# Patient Record
Sex: MALE | Race: WHITE | NOT HISPANIC OR LATINO | Employment: FULL TIME | URBAN - METROPOLITAN AREA
[De-identification: names, ages, dates, MRNs, and addresses within clinical notes are randomized per-mention and may not be internally consistent; named-entity substitution may affect disease eponyms.]

---

## 2017-03-31 RX ORDER — LISINOPRIL 10 MG/1
TABLET ORAL
Qty: 90 TABLET | Refills: 0 | Status: SHIPPED | OUTPATIENT
Start: 2017-03-31 | End: 2017-07-21 | Stop reason: SDUPTHER

## 2017-03-31 RX ORDER — SIMVASTATIN 40 MG
TABLET ORAL
Qty: 90 TABLET | Refills: 0 | Status: SHIPPED | OUTPATIENT
Start: 2017-03-31 | End: 2017-07-21 | Stop reason: SDUPTHER

## 2017-03-31 RX ORDER — SITAGLIPTIN AND METFORMIN HYDROCHLORIDE 1000; 50 MG/1; MG/1
TABLET, FILM COATED, EXTENDED RELEASE ORAL
Qty: 180 TABLET | Refills: 0 | Status: SHIPPED | OUTPATIENT
Start: 2017-03-31 | End: 2017-07-21 | Stop reason: SDUPTHER

## 2017-03-31 RX ORDER — LEVOTHYROXINE SODIUM 88 UG/1
TABLET ORAL
Qty: 90 TABLET | Refills: 0 | Status: SHIPPED | OUTPATIENT
Start: 2017-03-31 | End: 2017-07-21 | Stop reason: SDUPTHER

## 2017-07-03 RX ORDER — LISINOPRIL 10 MG/1
TABLET ORAL
Qty: 90 TABLET | OUTPATIENT
Start: 2017-07-03

## 2017-07-03 RX ORDER — SITAGLIPTIN AND METFORMIN HYDROCHLORIDE 1000; 50 MG/1; MG/1
TABLET, FILM COATED, EXTENDED RELEASE ORAL
Qty: 180 TABLET | OUTPATIENT
Start: 2017-07-03

## 2017-07-03 RX ORDER — SIMVASTATIN 40 MG
TABLET ORAL
Qty: 90 TABLET | OUTPATIENT
Start: 2017-07-03

## 2017-07-03 RX ORDER — LEVOTHYROXINE SODIUM 88 UG/1
TABLET ORAL
Qty: 90 TABLET | OUTPATIENT
Start: 2017-07-03

## 2017-07-03 RX ORDER — GLYBURIDE 5 MG/1
TABLET ORAL
Qty: 180 TABLET | Refills: 1 | OUTPATIENT
Start: 2017-07-03

## 2017-07-21 RX ORDER — SIMVASTATIN 40 MG
40 TABLET ORAL NIGHTLY
Qty: 90 TABLET | Refills: 0 | Status: SHIPPED | OUTPATIENT
Start: 2017-07-21 | End: 2017-10-22 | Stop reason: SDUPTHER

## 2017-07-21 RX ORDER — LEVOTHYROXINE SODIUM 88 UG/1
88 TABLET ORAL DAILY
Qty: 90 TABLET | Refills: 0 | Status: SHIPPED | OUTPATIENT
Start: 2017-07-21 | End: 2017-08-03

## 2017-07-21 RX ORDER — LISINOPRIL 10 MG/1
10 TABLET ORAL DAILY
Qty: 90 TABLET | Refills: 0 | Status: SHIPPED | OUTPATIENT
Start: 2017-07-21 | End: 2017-10-22 | Stop reason: SDUPTHER

## 2017-07-21 RX ORDER — GLYBURIDE 5 MG/1
5 TABLET ORAL 2 TIMES DAILY
Qty: 180 TABLET | Refills: 0 | Status: SHIPPED | OUTPATIENT
Start: 2017-07-21 | End: 2017-10-22 | Stop reason: SDUPTHER

## 2017-07-26 DIAGNOSIS — Z11.59 NEED FOR HEPATITIS C SCREENING TEST: Primary | ICD-10-CM

## 2017-07-28 LAB
25(OH)D3+25(OH)D2 SERPL-MCNC: 58.4 NG/ML (ref 30–100)
ALBUMIN SERPL-MCNC: 4.3 G/DL (ref 3.5–5.2)
ALBUMIN/GLOB SERPL: 1.4 G/DL
ALP SERPL-CCNC: 80 U/L (ref 39–117)
ALT SERPL-CCNC: 61 U/L (ref 1–41)
APPEARANCE UR: CLEAR
AST SERPL-CCNC: 48 U/L (ref 1–40)
BILIRUB SERPL-MCNC: 0.7 MG/DL (ref 0.1–1.2)
BILIRUB UR QL STRIP: NEGATIVE
BUN SERPL-MCNC: 20 MG/DL (ref 8–23)
BUN/CREAT SERPL: 14.3 (ref 7–25)
CALCIUM SERPL-MCNC: 9.7 MG/DL (ref 8.6–10.5)
CHLORIDE SERPL-SCNC: 105 MMOL/L (ref 98–107)
CHOLEST SERPL-MCNC: 119 MG/DL (ref 100–199)
CK SERPL-CCNC: 128 U/L (ref 20–200)
CO2 SERPL-SCNC: 21.5 MMOL/L (ref 22–29)
COLOR UR: YELLOW
CREAT SERPL-MCNC: 1.4 MG/DL (ref 0.76–1.27)
ERYTHROCYTE [DISTWIDTH] IN BLOOD BY AUTOMATED COUNT: 12.7 % (ref 11.5–14.5)
GLOBULIN SER CALC-MCNC: 3 GM/DL
GLUCOSE SERPL-MCNC: 134 MG/DL (ref 65–99)
GLUCOSE UR QL: NEGATIVE
HBA1C MFR BLD: 7.32 % (ref 4.8–5.6)
HCT VFR BLD AUTO: 40 % (ref 40.4–52.2)
HCV AB S/CO SERPL IA: 0.1 S/CO RATIO (ref 0–0.9)
HDL SERPL-SCNC: 30.8 UMOL/L
HDLC SERPL-MCNC: 37 MG/DL
HGB BLD-MCNC: 13.3 G/DL (ref 13.7–17.6)
HGB UR QL STRIP: NEGATIVE
KETONES UR QL STRIP: NEGATIVE
LDL SERPL QN: 19.9 NM
LDL SERPL-SCNC: 772 NMOL/L
LDL SMALL SERPL-SCNC: 549 NMOL/L
LDLC SERPL CALC-MCNC: 42 MG/DL (ref 0–99)
LEUKOCYTE ESTERASE UR QL STRIP: NEGATIVE
MCH RBC QN AUTO: 30 PG (ref 27–32.7)
MCHC RBC AUTO-ENTMCNC: 33.3 G/DL (ref 32.6–36.4)
MCV RBC AUTO: 90.3 FL (ref 79.8–96.2)
MICROALBUMIN UR-MCNC: 11.8 UG/ML
NITRITE UR QL STRIP: NEGATIVE
PH UR STRIP: 5.5 [PH] (ref 5–8)
PLATELET # BLD AUTO: 151 10*3/MM3 (ref 140–500)
POTASSIUM SERPL-SCNC: 4.2 MMOL/L (ref 3.5–5.2)
PROT SERPL-MCNC: 7.3 G/DL (ref 6–8.5)
PROT UR QL STRIP: NEGATIVE
PSA SERPL-MCNC: 0.23 NG/ML (ref 0–4)
RBC # BLD AUTO: 4.43 10*6/MM3 (ref 4.6–6)
SODIUM SERPL-SCNC: 141 MMOL/L (ref 136–145)
SP GR UR: 1.02 (ref 1–1.03)
T3FREE SERPL-MCNC: 2.7 PG/ML (ref 2–4.4)
T4 FREE SERPL-MCNC: 1.4 NG/DL (ref 0.93–1.7)
TRIGL SERPL-MCNC: 200 MG/DL (ref 0–149)
TSH SERPL DL<=0.005 MIU/L-ACNC: 5.42 MIU/ML (ref 0.27–4.2)
UROBILINOGEN UR STRIP-MCNC: NORMAL MG/DL
WBC # BLD AUTO: 5.14 10*3/MM3 (ref 4.5–10.7)

## 2017-08-03 ENCOUNTER — OFFICE VISIT (OUTPATIENT)
Dept: INTERNAL MEDICINE | Facility: CLINIC | Age: 62
End: 2017-08-03

## 2017-08-03 VITALS
OXYGEN SATURATION: 95 % | WEIGHT: 204 LBS | HEART RATE: 65 BPM | SYSTOLIC BLOOD PRESSURE: 104 MMHG | DIASTOLIC BLOOD PRESSURE: 66 MMHG | BODY MASS INDEX: 28.56 KG/M2 | HEIGHT: 71 IN

## 2017-08-03 DIAGNOSIS — E11.9 DIABETIC EYE EXAM (HCC): Chronic | ICD-10-CM

## 2017-08-03 DIAGNOSIS — I10 BENIGN ESSENTIAL HYPERTENSION: Chronic | ICD-10-CM

## 2017-08-03 DIAGNOSIS — Z51.81 THERAPEUTIC DRUG MONITORING: ICD-10-CM

## 2017-08-03 DIAGNOSIS — Z23 NEED FOR TDAP VACCINATION: ICD-10-CM

## 2017-08-03 DIAGNOSIS — K63.5 BENIGN COLON POLYP: Chronic | ICD-10-CM

## 2017-08-03 DIAGNOSIS — E03.9 HYPOTHYROIDISM, UNSPECIFIED TYPE: Chronic | ICD-10-CM

## 2017-08-03 DIAGNOSIS — Z01.00 DIABETIC EYE EXAM (HCC): Chronic | ICD-10-CM

## 2017-08-03 DIAGNOSIS — Z91.09 MULTIPLE ENVIRONMENTAL ALLERGIES: Chronic | ICD-10-CM

## 2017-08-03 DIAGNOSIS — Z23 NEED FOR PNEUMOCOCCAL VACCINATION: ICD-10-CM

## 2017-08-03 DIAGNOSIS — Z00.00 ROUTINE PHYSICAL EXAMINATION: Primary | ICD-10-CM

## 2017-08-03 DIAGNOSIS — R74.8 ELEVATED LIVER ENZYMES: ICD-10-CM

## 2017-08-03 DIAGNOSIS — E55.9 VITAMIN D DEFICIENCY: Chronic | ICD-10-CM

## 2017-08-03 DIAGNOSIS — E11.9 ENCOUNTER FOR DIABETIC FOOT EXAM (HCC): Chronic | ICD-10-CM

## 2017-08-03 DIAGNOSIS — D64.9 CHRONIC ANEMIA: ICD-10-CM

## 2017-08-03 DIAGNOSIS — E78.5 HYPERLIPIDEMIA, UNSPECIFIED HYPERLIPIDEMIA TYPE: Chronic | ICD-10-CM

## 2017-08-03 DIAGNOSIS — E11.9 TYPE 2 DIABETES MELLITUS WITHOUT COMPLICATION, WITHOUT LONG-TERM CURRENT USE OF INSULIN (HCC): Chronic | ICD-10-CM

## 2017-08-03 LAB
BASOPHILS # BLD AUTO: 0.03 10*3/MM3 (ref 0–0.2)
BASOPHILS NFR BLD AUTO: 0.5 % (ref 0–1.5)
EOSINOPHIL # BLD AUTO: 0.12 10*3/MM3 (ref 0–0.7)
EOSINOPHIL NFR BLD AUTO: 2.2 % (ref 0.3–6.2)
ERYTHROCYTE [DISTWIDTH] IN BLOOD BY AUTOMATED COUNT: 12.9 % (ref 11.5–14.5)
HCT VFR BLD AUTO: 40.6 % (ref 40.4–52.2)
HGB BLD-MCNC: 13.1 G/DL (ref 13.7–17.6)
IMM GRANULOCYTES # BLD: 0 10*3/MM3 (ref 0–0.03)
IMM GRANULOCYTES NFR BLD: 0 % (ref 0–0.5)
LYMPHOCYTES # BLD AUTO: 1.56 10*3/MM3 (ref 0.9–4.8)
LYMPHOCYTES NFR BLD AUTO: 28.5 % (ref 19.6–45.3)
MCH RBC QN AUTO: 29.5 PG (ref 27–32.7)
MCHC RBC AUTO-ENTMCNC: 32.3 G/DL (ref 32.6–36.4)
MCV RBC AUTO: 91.4 FL (ref 79.8–96.2)
MONOCYTES # BLD AUTO: 0.43 10*3/MM3 (ref 0.2–1.2)
MONOCYTES NFR BLD AUTO: 7.9 % (ref 5–12)
NEUTROPHILS # BLD AUTO: 3.33 10*3/MM3 (ref 1.9–8.1)
NEUTROPHILS NFR BLD AUTO: 60.9 % (ref 42.7–76)
PLATELET # BLD AUTO: 166 10*3/MM3 (ref 140–500)
RBC # BLD AUTO: 4.44 10*6/MM3 (ref 4.6–6)
WBC # BLD AUTO: 5.47 10*3/MM3 (ref 4.5–10.7)

## 2017-08-03 PROCEDURE — 90472 IMMUNIZATION ADMIN EACH ADD: CPT | Performed by: INTERNAL MEDICINE

## 2017-08-03 PROCEDURE — 90715 TDAP VACCINE 7 YRS/> IM: CPT | Performed by: INTERNAL MEDICINE

## 2017-08-03 PROCEDURE — 99213 OFFICE O/P EST LOW 20 MIN: CPT | Performed by: INTERNAL MEDICINE

## 2017-08-03 PROCEDURE — 99396 PREV VISIT EST AGE 40-64: CPT | Performed by: INTERNAL MEDICINE

## 2017-08-03 PROCEDURE — 90732 PPSV23 VACC 2 YRS+ SUBQ/IM: CPT | Performed by: INTERNAL MEDICINE

## 2017-08-03 PROCEDURE — 90471 IMMUNIZATION ADMIN: CPT | Performed by: INTERNAL MEDICINE

## 2017-08-03 RX ORDER — LEVOTHYROXINE SODIUM 0.12 MG/1
TABLET ORAL
Qty: 30 TABLET | Refills: 6 | Status: SHIPPED | OUTPATIENT
Start: 2017-08-03 | End: 2018-02-02 | Stop reason: SDUPTHER

## 2017-08-04 LAB
IRON SATN MFR SERPL: 24 % (ref 20–50)
IRON SERPL-MCNC: 93 MCG/DL (ref 59–158)
TIBC SERPL-MCNC: 389 MCG/DL
UIBC SERPL-MCNC: 296 MCG/DL

## 2017-08-04 NOTE — PROGRESS NOTES
I called pt per dr Arrington and gave him the results.  He also ask about his thyroid med was it changed or was he waiting on the other test results.  I looked and 125mcg of levothyroxine was sent in w 6rf so I let him know that he was to be taking that now.

## 2017-08-08 ENCOUNTER — RESULTS ENCOUNTER (OUTPATIENT)
Dept: INTERNAL MEDICINE | Facility: CLINIC | Age: 62
End: 2017-08-08

## 2017-08-08 DIAGNOSIS — R74.8 ELEVATED LIVER ENZYMES: ICD-10-CM

## 2017-08-08 LAB
2ME-CITRATE SERPL-MCNC: 153 NMOL/L (ref 60–228)
CYSTATHIONIN SERPL-SCNC: 303 NMOL/L (ref 44–342)
HCYS SERPL-SCNC: 12.6 UMOL/L (ref 5.1–13.9)
Lab: NORMAL
Lab: NORMAL
METHYLMALONATE SERPL-SCNC: 158 NMOL/L (ref 0–378)

## 2017-09-06 ENCOUNTER — TELEPHONE (OUTPATIENT)
Dept: INTERNAL MEDICINE | Facility: CLINIC | Age: 62
End: 2017-09-06

## 2017-09-06 NOTE — TELEPHONE ENCOUNTER
The iron studies were normal.  There is no B12 or folic acid deficiency.  The anemia is very mild on repeat CBC.  Nothing further to do other than observation.  No worry.

## 2017-10-23 RX ORDER — SIMVASTATIN 40 MG
TABLET ORAL
Qty: 90 TABLET | Refills: 0 | Status: SHIPPED | OUTPATIENT
Start: 2017-10-23 | End: 2018-02-02 | Stop reason: SDUPTHER

## 2017-10-23 RX ORDER — SITAGLIPTIN AND METFORMIN HYDROCHLORIDE 1000; 50 MG/1; MG/1
TABLET, FILM COATED, EXTENDED RELEASE ORAL
Qty: 180 TABLET | Refills: 0 | Status: SHIPPED | OUTPATIENT
Start: 2017-10-23 | End: 2018-02-02 | Stop reason: SDUPTHER

## 2017-10-23 RX ORDER — GLYBURIDE 5 MG/1
TABLET ORAL
Qty: 180 TABLET | Refills: 0 | Status: SHIPPED | OUTPATIENT
Start: 2017-10-23 | End: 2018-02-02 | Stop reason: SDUPTHER

## 2017-10-23 RX ORDER — LISINOPRIL 10 MG/1
TABLET ORAL
Qty: 90 TABLET | Refills: 0 | Status: SHIPPED | OUTPATIENT
Start: 2017-10-23 | End: 2018-02-02 | Stop reason: SDUPTHER

## 2018-01-22 RX ORDER — SITAGLIPTIN AND METFORMIN HYDROCHLORIDE 1000; 50 MG/1; MG/1
TABLET, FILM COATED, EXTENDED RELEASE ORAL
Qty: 180 TABLET | Refills: 0 | OUTPATIENT
Start: 2018-01-22

## 2018-01-22 RX ORDER — LISINOPRIL 10 MG/1
TABLET ORAL
Qty: 90 TABLET | Refills: 0 | OUTPATIENT
Start: 2018-01-22

## 2018-01-22 RX ORDER — SIMVASTATIN 40 MG
TABLET ORAL
Qty: 90 TABLET | Refills: 0 | OUTPATIENT
Start: 2018-01-22

## 2018-01-22 RX ORDER — GLYBURIDE 5 MG/1
TABLET ORAL
Qty: 180 TABLET | Refills: 0 | OUTPATIENT
Start: 2018-01-22

## 2018-02-02 DIAGNOSIS — E03.9 HYPOTHYROIDISM, UNSPECIFIED TYPE: Chronic | ICD-10-CM

## 2018-02-02 RX ORDER — LEVOTHYROXINE SODIUM 0.12 MG/1
TABLET ORAL
Qty: 90 TABLET | Refills: 0 | Status: SHIPPED | OUTPATIENT
Start: 2018-02-02 | End: 2018-08-14 | Stop reason: SDUPTHER

## 2018-02-02 RX ORDER — GLYBURIDE 5 MG/1
5 TABLET ORAL 2 TIMES DAILY WITH MEALS
Qty: 180 TABLET | Refills: 0 | Status: SHIPPED | OUTPATIENT
Start: 2018-02-02 | End: 2018-05-03 | Stop reason: SDUPTHER

## 2018-02-02 RX ORDER — SIMVASTATIN 40 MG
40 TABLET ORAL NIGHTLY
Qty: 90 TABLET | Refills: 0 | Status: SHIPPED | OUTPATIENT
Start: 2018-02-02 | End: 2018-05-03 | Stop reason: SDUPTHER

## 2018-02-02 RX ORDER — LISINOPRIL 10 MG/1
10 TABLET ORAL DAILY
Qty: 90 TABLET | Refills: 0 | Status: SHIPPED | OUTPATIENT
Start: 2018-02-02 | End: 2018-05-03 | Stop reason: SDUPTHER

## 2018-02-20 ENCOUNTER — OFFICE VISIT (OUTPATIENT)
Dept: INTERNAL MEDICINE | Facility: CLINIC | Age: 63
End: 2018-02-20

## 2018-02-20 VITALS
WEIGHT: 203 LBS | DIASTOLIC BLOOD PRESSURE: 64 MMHG | SYSTOLIC BLOOD PRESSURE: 104 MMHG | OXYGEN SATURATION: 99 % | BODY MASS INDEX: 28.42 KG/M2 | HEIGHT: 71 IN | HEART RATE: 71 BPM

## 2018-02-20 DIAGNOSIS — D64.9 CHRONIC ANEMIA: Chronic | ICD-10-CM

## 2018-02-20 DIAGNOSIS — I10 BENIGN ESSENTIAL HYPERTENSION: Chronic | ICD-10-CM

## 2018-02-20 DIAGNOSIS — R74.8 ELEVATED LIVER ENZYMES: Chronic | ICD-10-CM

## 2018-02-20 DIAGNOSIS — Z51.81 THERAPEUTIC DRUG MONITORING: ICD-10-CM

## 2018-02-20 DIAGNOSIS — Z00.00 ROUTINE PHYSICAL EXAMINATION: ICD-10-CM

## 2018-02-20 DIAGNOSIS — E11.9 TYPE 2 DIABETES MELLITUS WITHOUT COMPLICATION, WITHOUT LONG-TERM CURRENT USE OF INSULIN (HCC): Primary | Chronic | ICD-10-CM

## 2018-02-20 DIAGNOSIS — E55.9 VITAMIN D DEFICIENCY: Chronic | ICD-10-CM

## 2018-02-20 DIAGNOSIS — E03.9 HYPOTHYROIDISM, UNSPECIFIED TYPE: Chronic | ICD-10-CM

## 2018-02-20 DIAGNOSIS — Z86.010 HISTORY OF COLON POLYPS: Chronic | ICD-10-CM

## 2018-02-20 DIAGNOSIS — E78.5 HYPERLIPIDEMIA, UNSPECIFIED HYPERLIPIDEMIA TYPE: Chronic | ICD-10-CM

## 2018-02-20 LAB
IRON SATN MFR SERPL: 15 % (ref 20–50)
IRON SERPL-MCNC: 60 MCG/DL (ref 59–158)
TIBC SERPL-MCNC: 393 MCG/DL
UIBC SERPL-MCNC: 333 MCG/DL

## 2018-02-20 PROCEDURE — 99214 OFFICE O/P EST MOD 30 MIN: CPT | Performed by: INTERNAL MEDICINE

## 2018-02-20 NOTE — PROGRESS NOTES
02/20/2018    Patient Information  Lionel Carson                                                                                          [address] [place] [id number]      1955  [phone]      Chief Complaint:     Follow-up type 2 diabetes, hyperlipidemia, hypothyroidism, history of colon polyps, hypertension, chronic anemia, elevated liver enzymes, vitamin D deficiency.  No new acute complaints.    History of Present Illness:    Patient with a history of medical problems as outlined in the chief complaint presents today for a follow-up with lab prior in order to monitor his chronic medical issues.  He currently feels well and is tolerating his medications well.  Past medical history reviewed and updated where necessary including health maintenance parameters.  This reveals he is up-to-date with the exception of Zostavax.  I've asked patient to check with his insurance regarding coverage.    Review of Systems   Constitution: Negative.   HENT: Negative.    Eyes: Negative.    Cardiovascular: Negative.    Respiratory: Negative.    Endocrine: Negative.    Hematologic/Lymphatic: Negative.    Skin: Negative.    Musculoskeletal: Negative.    Gastrointestinal: Negative.    Genitourinary: Negative.    Neurological: Negative.    Psychiatric/Behavioral: Negative.    Allergic/Immunologic: Negative.        Active Problems:    Patient Active Problem List   Diagnosis   • Allergic rhinitis   • Benign essential hypertension   • History of colon polyps, 01/26/2015--tubular adenoma ×1.  Probable hyperplastic ×1.  Repeat due 3 years.   • Diverticulosis of colon   • Hyperlipidemia   • Hypothyroidism   • Multiple environmental allergies   • Type 2 diabetes mellitus   • Vitamin D deficiency   • Therapeutic drug monitoring   • Routine physical examination   • Diabetic foot exam   • Diabetic eye exam   • Chronic anemia   • Elevated liver enzymes         Past Medical History:   Diagnosis Date   • Benign  essential hypertension 9/7/2007 09/07/2007--initial treatment with lisinopril that was primarily for renal protection.   • Chronic anemia 8/3/2017    08/03/2017--routine physical.  Hemoglobin is low at 13.3.  Hematocrit low at 40.0.  Indices are normal.  Repeat CBC, iron studies, homocystine, and methylmalonic acid ordered.  Patient will follow-up on the phone for the results and possible further instructions.  He is aware that it takes 2 weeks for one lab test to return.   • Diabetic eye exam 3/17/2017    03/17/2017--routine diabetic eye exam reveals no evidence of diabetic retinopathy.  Cataracts both eyes.  Observation recommended.  07/09/2014--routine ophthalmologic examination reveals no evidence of diabetic retinopathy.     02/02/2009--eye examination revealed no evidence of diabetic retinopathy, ocular hypertension noted OD.   • Diabetic foot exam 8/3/2017    08/03/2017--routine diabetic foot exam reveals no evidence of diabetic foot ulcer or pre-ulcerative callus.  Distal pulses are very palpable and there is no signs of ischemia.  Sensation subjectively intact.   • Diverticulosis of colon 1/26/2015 01/26/2015--colonoscopy revealed extensive internal and external hemorrhoids. The patient had 2 sessile polyps, one at the splenic flexure and the other in the ascending colon near the hepatic flexure which were removed and sent for pathology. Left colon diverticulosis also noted. The ascending colon polyp returned tubular adenoma. The splenic flexure polyp was markedly cauterized and partially denuded. Focal hyperplastic features. No definite evidence of adenomatous change. Repeat study due 3 years.   • Elevated liver enzymes 8/3/2017    08/03/2017--routine physical.  AST elevated at 48.  ALT elevated at 61.  Suspect HEADLEY.  Hepatitis C antibody screening is negative.  We will repeat CMP in a few weeks and if the liver enzymes remain elevated then we will proceed with a liver ultrasound.   • History of  anemia 02/04/2015 12/06/2015--hemoglobin normal at 13.2, hematocrit normal at 39.5. Resolve this issue.   02/04/2015--homocystine and methylmalonic acid are normal. Serum iron low normal at 62. Iron saturation low at 16%. Colonoscopy ordered and returned one hyperplastic polyp and one tubular adenomatous polyp.   01/25/2015--routine CBC reveals a hemoglobin low at 13.1, hematocrit low at 40.3. Homocystine, methylma   • History of colon polyps, 01/26/2015--tubular adenoma ×1.  Probable hyperplastic ×1.  Repeat due 3 years. 1/26/2015 01/26/2015--colonoscopy revealed extensive internal and external hemorrhoids. The patient had 2 sessile polyps, one at the splenic flexure and the other in the ascending colon near the hepatic flexure which were removed and sent for pathology. Left colon diverticulosis also noted. The ascending colon polyp returned tubular adenoma. The splenic flexure polyp was markedly cauterized and partially denuded. Focal hyperplastic features. No definite evidence of adenomatous change. Repeat study due 3 years.   • Hyperlipidemia 9/7/2007 09/07/2007--initial treatment hyperlipidemia.   • Hypothyroidism 6/18/2008 06/18/2008--initial diagnosis and treatment hypothyroidism.   • Multiple environmental allergies 5/17/2016    Patient had allergy testing as a child.   • Type 2 diabetes mellitus 7/17/2006 July 2006--initial diagnosis diabetes.   • Vitamin D deficiency 6/16/2016         Past Surgical History:   Procedure Laterality Date   • COLONOSCOPY  01/26/2015 01/26/2015--colonoscopy revealed extensive internal and external hemorrhoids. The patient had 2 sessile polyps, one at the splenic flexure and the other in the ascending colon near the hepatic flexure which were removed and sent for pathology. Left colon diverticulosis also noted. The ascending colon polyp returned tubular adenoma. The splenic flexure polyp was markedly cauterized and partially de         No Known  "Allergies        Current Outpatient Prescriptions:   •  aspirin 81 MG EC tablet, Take 1 tablet by mouth daily., Disp: , Rfl:   •  Cholecalciferol (VITAMIN D) 2000 UNITS capsule, Take 1 capsule by mouth daily., Disp: , Rfl:   •  glyBURIDE (DIAbeta) 5 MG tablet, Take 1 tablet by mouth 2 (Two) Times a Day With Meals., Disp: 180 tablet, Rfl: 0  •  levothyroxine (SYNTHROID) 125 MCG tablet, 1 by mouth daily for thyroid, Disp: 90 tablet, Rfl: 0  •  lisinopril (PRINIVIL,ZESTRIL) 10 MG tablet, Take 1 tablet by mouth Daily., Disp: 90 tablet, Rfl: 0  •  simvastatin (ZOCOR) 40 MG tablet, Take 1 tablet by mouth Every Night., Disp: 90 tablet, Rfl: 0  •  SITagliptin-MetFORMIN HCl ER (JANUMET XR)  MG tablet sustained-release 24 hour, Take 1 tablet by mouth 2 (Two) Times a Day., Disp: 180 tablet, Rfl: 0      Family History   Problem Relation Age of Onset   • Diabetes Mother      Type 2         Social History     Social History   • Marital status:      Spouse name: N/A   • Number of children: N/A   • Years of education: N/A     Occupational History   • 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  No acute distress.  Normal affect.  HEENT: Pupils equal, round, reactive to light; extraocular movements intact; sclerae nonicteric; pharynx, ear canals and TMs normal.  Neck: Without JVD, thyromegaly, bruit, or adenopathy.  Lungs: Clear to auscultation in all fields.  Heart: Regular rate and rhythm without murmur, rub, gallop, or click.  Abdomen: Soft, nontender, without hepatosplenomegaly or hernia.  " Bowel sounds normal.  : Deferred.  Rectal: Deferred.  Extremities: Without clubbing, cyanosis, edema, or pulse deficit.  Neurologic: Intact without focal deficit.  Normal station and gait observed during ingress and egress from the examination room.  Skin: Without significant lesion.  Musculoskeletal: Unremarkable.      Lab/other results:    NMR reveals total cholesterol 107.  Triglycerides slightly elevated 157.  LDL particle number excellent at 485.  Small LDL particle number excellent at 213.  HDL particle number is low at 26.9.  CMP normal except blood sugar elevated 123, ALT slightly elevated at 46.  Hemoglobin low at 12.8, hematocrit low at 39.6.  Hemoglobin A1c 7.8.  Thyroid function tests are normal.  Mild elevation of free T4 noted and suspected laboratory error.  PSA normal at 0.217.  Vitamin D normal at 68.3.  CPK normal.    Assessment/Plan:     Diagnosis Plan   1. Type 2 diabetes mellitus without complication, without long-term current use of insulin     2. Hyperlipidemia, unspecified hyperlipidemia type     3. Hypothyroidism, unspecified type     4. History of colon polyps, 01/26/2015--tubular adenoma ×1.  Probable hyperplastic ×1.  Repeat due 3 years.     5. Benign essential hypertension     6. Chronic anemia     7. Elevated liver enzymes     8. Vitamin D deficiency     9. Therapeutic drug monitoring     10. Routine physical examination       Patient has type 2 diabetes is under reasonable control although it is not at the expected goal.  I'm resistant to add more medication at this time and instead of encouraged patient to work on diet and weight loss.  Hyperlipidemia is under good control.  His thyroid is therapeutic.  Blood pressure well controlled.  Patient has a history of colon polyps and had a colonoscopy one year ago which revealed a tubular adenoma times one.  Normally repeat colonoscopy would be 5 years from that date.  However, patient does have an anemia but it does not appear at this  point to be an iron deficiency anemia.  This needs to be reassessed.  Mild elevation of liver enzymes has actually improved and I suspect this is related to HEADLEY.  Vitamin D is therapeutic.    Plan is as follows: Check serum iron studies.  Patient will follow-up on the phone for the results.  If his iron is on the low side then I will refer him for repeat colonoscopy.  Patient instructed to work on weight loss and decrease carbohydrate intake.  Exercise would be helpful as well.  I will have patient follow-up after 08/03/2018 for his annual physical exam with lab prior.  If his insurance covers Zostavax and we can give it at that time.        Procedures

## 2018-05-03 RX ORDER — SIMVASTATIN 40 MG
TABLET ORAL
Qty: 90 TABLET | Refills: 1 | Status: SHIPPED | OUTPATIENT
Start: 2018-05-03 | End: 2018-10-30 | Stop reason: SDUPTHER

## 2018-05-03 RX ORDER — LISINOPRIL 10 MG/1
TABLET ORAL
Qty: 90 TABLET | Refills: 1 | Status: SHIPPED | OUTPATIENT
Start: 2018-05-03 | End: 2018-10-30 | Stop reason: SDUPTHER

## 2018-05-03 RX ORDER — GLYBURIDE 5 MG/1
TABLET ORAL
Qty: 180 TABLET | Refills: 1 | Status: SHIPPED | OUTPATIENT
Start: 2018-05-03 | End: 2018-10-30 | Stop reason: SDUPTHER

## 2018-05-03 RX ORDER — SITAGLIPTIN AND METFORMIN HYDROCHLORIDE 1000; 50 MG/1; MG/1
TABLET, FILM COATED, EXTENDED RELEASE ORAL
Qty: 180 TABLET | Refills: 1 | Status: SHIPPED | OUTPATIENT
Start: 2018-05-03 | End: 2018-10-30 | Stop reason: SDUPTHER

## 2018-08-03 ENCOUNTER — RESULTS ENCOUNTER (OUTPATIENT)
Dept: INTERNAL MEDICINE | Facility: CLINIC | Age: 63
End: 2018-08-03

## 2018-08-03 DIAGNOSIS — D64.9 CHRONIC ANEMIA: ICD-10-CM

## 2018-08-03 DIAGNOSIS — R74.8 ELEVATED LIVER ENZYMES: ICD-10-CM

## 2018-08-03 DIAGNOSIS — E03.9 HYPOTHYROIDISM, UNSPECIFIED TYPE: Chronic | ICD-10-CM

## 2018-08-03 DIAGNOSIS — E11.9 TYPE 2 DIABETES MELLITUS WITHOUT COMPLICATION, WITHOUT LONG-TERM CURRENT USE OF INSULIN (HCC): Chronic | ICD-10-CM

## 2018-08-06 DIAGNOSIS — D64.9 CHRONIC ANEMIA: Chronic | ICD-10-CM

## 2018-08-06 DIAGNOSIS — Z00.00 ROUTINE PHYSICAL EXAMINATION: ICD-10-CM

## 2018-08-06 DIAGNOSIS — E78.5 HYPERLIPIDEMIA, UNSPECIFIED HYPERLIPIDEMIA TYPE: Chronic | ICD-10-CM

## 2018-08-06 DIAGNOSIS — E03.9 HYPOTHYROIDISM, UNSPECIFIED TYPE: Chronic | ICD-10-CM

## 2018-08-06 DIAGNOSIS — E55.9 VITAMIN D DEFICIENCY: Chronic | ICD-10-CM

## 2018-08-06 DIAGNOSIS — E11.9 TYPE 2 DIABETES MELLITUS WITHOUT COMPLICATION, WITHOUT LONG-TERM CURRENT USE OF INSULIN (HCC): Chronic | ICD-10-CM

## 2018-08-09 LAB
25(OH)D3+25(OH)D2 SERPL-MCNC: 48.9 NG/ML (ref 30–100)
ALBUMIN SERPL-MCNC: 4.5 G/DL (ref 3.5–5.2)
ALBUMIN/CREAT UR: 5.2 MG/G CREAT (ref 0–30)
ALBUMIN/GLOB SERPL: 1.9 G/DL
ALP SERPL-CCNC: 74 U/L (ref 39–117)
ALT SERPL-CCNC: 65 U/L (ref 1–41)
APPEARANCE UR: ABNORMAL
AST SERPL-CCNC: 37 U/L (ref 1–40)
BILIRUB SERPL-MCNC: 0.9 MG/DL (ref 0.1–1.2)
BILIRUB UR QL STRIP: NEGATIVE
BUN SERPL-MCNC: 13 MG/DL (ref 8–23)
BUN/CREAT SERPL: 10.7 (ref 7–25)
CALCIUM SERPL-MCNC: 8.9 MG/DL (ref 8.6–10.5)
CHLORIDE SERPL-SCNC: 103 MMOL/L (ref 98–107)
CHOLEST SERPL-MCNC: 116 MG/DL (ref 100–199)
CK SERPL-CCNC: 95 U/L (ref 20–200)
CO2 SERPL-SCNC: 24 MMOL/L (ref 22–29)
COLOR UR: YELLOW
CREAT SERPL-MCNC: 1.22 MG/DL (ref 0.76–1.27)
CREAT UR-MCNC: 159.6 MG/DL
ERYTHROCYTE [DISTWIDTH] IN BLOOD BY AUTOMATED COUNT: 12.9 % (ref 11.5–14.5)
GLOBULIN SER CALC-MCNC: 2.4 GM/DL
GLUCOSE SERPL-MCNC: 131 MG/DL (ref 65–99)
GLUCOSE UR QL: NEGATIVE
HBA1C MFR BLD: 8.35 % (ref 4.8–5.6)
HCT VFR BLD AUTO: 38 % (ref 40.4–52.2)
HDL SERPL-SCNC: 31.9 UMOL/L
HDLC SERPL-MCNC: 39 MG/DL
HGB BLD-MCNC: 12.5 G/DL (ref 13.7–17.6)
HGB UR QL STRIP: NEGATIVE
KETONES UR QL STRIP: NEGATIVE
LDL SERPL QN: 20 NM
LDL SERPL-SCNC: 593 NMOL/L
LDL SMALL SERPL-SCNC: 417 NMOL/L
LDLC SERPL CALC-MCNC: 43 MG/DL (ref 0–99)
LEUKOCYTE ESTERASE UR QL STRIP: NEGATIVE
MCH RBC QN AUTO: 29.1 PG (ref 27–32.7)
MCHC RBC AUTO-ENTMCNC: 32.9 G/DL (ref 32.6–36.4)
MCV RBC AUTO: 88.6 FL (ref 79.8–96.2)
MICROALBUMIN UR-MCNC: 8.3 UG/ML
NITRITE UR QL STRIP: NEGATIVE
PH UR STRIP: <=5 [PH] (ref 5–8)
PLATELET # BLD AUTO: 157 10*3/MM3 (ref 140–500)
POTASSIUM SERPL-SCNC: 3.9 MMOL/L (ref 3.5–5.2)
PROT SERPL-MCNC: 6.9 G/DL (ref 6–8.5)
PROT UR QL STRIP: NEGATIVE
PSA SERPL-MCNC: 0.23 NG/ML (ref 0–4)
RBC # BLD AUTO: 4.29 10*6/MM3 (ref 4.6–6)
SODIUM SERPL-SCNC: 142 MMOL/L (ref 136–145)
SP GR UR: 1.02 (ref 1–1.03)
T3FREE SERPL-MCNC: 2.7 PG/ML (ref 2–4.4)
T4 FREE SERPL-MCNC: 1.61 NG/DL (ref 0.93–1.7)
TRIGL SERPL-MCNC: 168 MG/DL (ref 0–149)
TSH SERPL DL<=0.005 MIU/L-ACNC: 0.96 MIU/ML (ref 0.27–4.2)
UROBILINOGEN UR STRIP-MCNC: ABNORMAL MG/DL
WBC # BLD AUTO: 5.64 10*3/MM3 (ref 4.5–10.7)

## 2018-08-14 ENCOUNTER — OFFICE VISIT (OUTPATIENT)
Dept: INTERNAL MEDICINE | Facility: CLINIC | Age: 63
End: 2018-08-14

## 2018-08-14 VITALS
HEIGHT: 71 IN | SYSTOLIC BLOOD PRESSURE: 112 MMHG | HEART RATE: 68 BPM | OXYGEN SATURATION: 99 % | DIASTOLIC BLOOD PRESSURE: 64 MMHG | BODY MASS INDEX: 29.09 KG/M2 | WEIGHT: 207.8 LBS

## 2018-08-14 DIAGNOSIS — E11.9 TYPE 2 DIABETES MELLITUS WITHOUT COMPLICATION, WITHOUT LONG-TERM CURRENT USE OF INSULIN (HCC): Chronic | ICD-10-CM

## 2018-08-14 DIAGNOSIS — E78.5 HYPERLIPIDEMIA, UNSPECIFIED HYPERLIPIDEMIA TYPE: Chronic | ICD-10-CM

## 2018-08-14 DIAGNOSIS — Z51.81 THERAPEUTIC DRUG MONITORING: ICD-10-CM

## 2018-08-14 DIAGNOSIS — E03.9 HYPOTHYROIDISM, UNSPECIFIED TYPE: Chronic | ICD-10-CM

## 2018-08-14 DIAGNOSIS — E11.9 ENCOUNTER FOR DIABETIC FOOT EXAM (HCC): Chronic | ICD-10-CM

## 2018-08-14 DIAGNOSIS — Z91.09 MULTIPLE ENVIRONMENTAL ALLERGIES: Chronic | ICD-10-CM

## 2018-08-14 DIAGNOSIS — D64.9 CHRONIC ANEMIA: Chronic | ICD-10-CM

## 2018-08-14 DIAGNOSIS — Z86.010 HISTORY OF COLON POLYPS: Chronic | ICD-10-CM

## 2018-08-14 DIAGNOSIS — R74.8 ELEVATED LIVER ENZYMES: Chronic | ICD-10-CM

## 2018-08-14 DIAGNOSIS — Z00.00 ROUTINE PHYSICAL EXAMINATION: Primary | ICD-10-CM

## 2018-08-14 DIAGNOSIS — I10 BENIGN ESSENTIAL HYPERTENSION: Chronic | ICD-10-CM

## 2018-08-14 PROCEDURE — 99396 PREV VISIT EST AGE 40-64: CPT | Performed by: INTERNAL MEDICINE

## 2018-08-14 RX ORDER — LEVOTHYROXINE SODIUM 0.12 MG/1
TABLET ORAL
Qty: 90 TABLET | Refills: 3 | Status: SHIPPED | OUTPATIENT
Start: 2018-08-14 | End: 2019-07-10 | Stop reason: SDUPTHER

## 2018-08-14 NOTE — PROGRESS NOTES
08/14/2018    Patient Information  Lionel Carson                                                                                          [address] [place] [id number]      1955  [phone]      Chief Complaint:     Routine annual physical examination and follow-up lab work.  No new acute complaints.    History of Present Illness:    Patient with history of type 2 diabetes, hyperlipidemia, hypothyroidism, history of colon polyps, hypertension, chronic anemia, elevated liver enzymes, environmental allergies.  He presents today for his routine annual exam and follow-up lab work in order to monitor his chronic medical issues and he feels well and has no new acute complaints.  Past medical history reviewed and updated where necessary including health maintenance parameters.  This reveals he is up-to-date except he needs a repeat colonoscopy given his history of colon polyps.  He also needs the new shingles vaccination and I recommended he stop by the local drug store to see if it is covered at a reasonable cost.  Patient also needs his diabetic eye exam and I encouraged him to do so.  Diabetic foot exam will be performed today.    Review of Systems   Constitution: Negative.   HENT: Negative.    Eyes: Negative.    Cardiovascular: Negative.    Respiratory: Negative.    Endocrine: Negative.    Hematologic/Lymphatic: Negative.    Skin: Negative.    Musculoskeletal: Negative.    Gastrointestinal: Negative.    Genitourinary: Negative.    Neurological: Negative.    Psychiatric/Behavioral: Negative.    Allergic/Immunologic: Negative.        Active Problems:    Patient Active Problem List   Diagnosis   • Allergic rhinitis   • Benign essential hypertension   • History of colon polyps, 01/26/2015--tubular adenoma ×1.  Probable hyperplastic ×1.  Repeat due 3 years.   • Diverticulosis of colon   • Hyperlipidemia   • Hypothyroidism   • Multiple environmental allergies   • Type 2 diabetes mellitus  (CMS/[facility])   • Vitamin D deficiency   • Therapeutic drug monitoring   • Routine physical examination   • Diabetic foot exam   • Diabetic eye exam (CMS/[facility])   • Chronic anemia   • Elevated liver enzymes         Past Medical History:   Diagnosis Date   • Benign essential hypertension 9/7/2007 09/07/2007--initial treatment with lisinopril that was primarily for renal protection.   • Chronic anemia 8/3/2017    08/03/2017--routine physical.  Hemoglobin is low at 13.3.  Hematocrit low at 40.0.  Indices are normal.  Repeat CBC, iron studies, homocystine, and methylmalonic acid ordered.  Patient will follow-up on the phone for the results and possible further instructions.  He is aware that it takes 2 weeks for one lab test to return.   • Diabetic eye exam (CMS/[facility]) 3/17/2017    03/17/2017--routine diabetic eye exam reveals no evidence of diabetic retinopathy.  Cataracts both eyes.  Observation recommended.  07/09/2014--routine ophthalmologic examination reveals no evidence of diabetic retinopathy.     02/02/2009--eye examination revealed no evidence of diabetic retinopathy, ocular hypertension noted OD.   • Diabetic foot exam 8/3/2017    08/03/2017--routine diabetic foot exam reveals no evidence of diabetic foot ulcer or pre-ulcerative callus.  Distal pulses are very palpable and there is no signs of ischemia.  Sensation subjectively intact.   • Diverticulosis of colon 1/26/2015 01/26/2015--colonoscopy revealed extensive internal and external hemorrhoids. The patient had 2 sessile polyps, one at the splenic flexure and the other in the ascending colon near the hepatic flexure which were removed and sent for pathology. Left colon diverticulosis also noted. The ascending colon polyp returned tubular adenoma. The splenic flexure polyp was markedly cauterized and partially denuded. Focal hyperplastic features. No definite evidence of adenomatous change. Repeat study due 3 years.   • Elevated liver enzymes 8/3/2017     08/03/2017--routine physical.  AST elevated at 48.  ALT elevated at 61.  Suspect HEADLEY.  Hepatitis C antibody screening is negative.  We will repeat CMP in a few weeks and if the liver enzymes remain elevated then we will proceed with a liver ultrasound.   • History of anemia 02/04/2015 12/06/2015--hemoglobin normal at 13.2, hematocrit normal at 39.5. Resolve this issue.   02/04/2015--homocystine and methylmalonic acid are normal. Serum iron low normal at 62. Iron saturation low at 16%. Colonoscopy ordered and returned one hyperplastic polyp and one tubular adenomatous polyp.   01/25/2015--routine CBC reveals a hemoglobin low at 13.1, hematocrit low at 40.3. Homocystine, methylma   • History of colon polyps, 01/26/2015--tubular adenoma ×1.  Probable hyperplastic ×1.  Repeat due 3 years. 1/26/2015 01/26/2015--colonoscopy revealed extensive internal and external hemorrhoids. The patient had 2 sessile polyps, one at the splenic flexure and the other in the ascending colon near the hepatic flexure which were removed and sent for pathology. Left colon diverticulosis also noted. The ascending colon polyp returned tubular adenoma. The splenic flexure polyp was markedly cauterized and partially denuded. Focal hyperplastic features. No definite evidence of adenomatous change. Repeat study due 3 years.   • Hyperlipidemia 9/7/2007 09/07/2007--initial treatment hyperlipidemia.   • Hypothyroidism 6/18/2008 06/18/2008--initial diagnosis and treatment hypothyroidism.   • Multiple environmental allergies 5/17/2016    Patient had allergy testing as a child.   • Type 2 diabetes mellitus (CMS/HCC) 7/17/2006 July 2006--initial diagnosis diabetes.   • Vitamin D deficiency 6/16/2016         Past Surgical History:   Procedure Laterality Date   • COLONOSCOPY  01/26/2015 01/26/2015--colonoscopy revealed extensive internal and external hemorrhoids. The patient had 2 sessile polyps, one at the splenic flexure and the other in  "the ascending colon near the hepatic flexure which were removed and sent for pathology. Left colon diverticulosis also noted. The ascending colon polyp returned tubular adenoma. The splenic flexure polyp was markedly cauterized and partially de         No Known Allergies        Current Outpatient Prescriptions:   •  aspirin 81 MG EC tablet, Take 1 tablet by mouth daily., Disp: , Rfl:   •  Cholecalciferol (VITAMIN D) 2000 UNITS capsule, Take 1 capsule by mouth daily., Disp: , Rfl:   •  glyBURIDE (DIAbeta) 5 MG tablet, TAKE 1 TABLET TWICE A DAY WITH MEALS, Disp: 180 tablet, Rfl: 1  •  JANUMET XR  MG tablet, TAKE 1 TABLET TWICE A DAY, Disp: 180 tablet, Rfl: 1  •  lisinopril (PRINIVIL,ZESTRIL) 10 MG tablet, TAKE 1 TABLET DAILY, Disp: 90 tablet, Rfl: 1  •  simvastatin (ZOCOR) 40 MG tablet, TAKE 1 TABLET EVERY NIGHT, Disp: 90 tablet, Rfl: 1  •  levothyroxine (SYNTHROID) 125 MCG tablet, 1 by mouth daily for thyroid, Disp: 90 tablet, Rfl: 3      Family History   Problem Relation Age of Onset   • Diabetes Mother         Type 2         Social History     Social History   • Marital status:      Spouse name: N/A   • Number of children: N/A   • Years of education: N/A     Occupational History   • 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  HEENT: pupils equal, round, and reactive to light; extraocular movements intact; sclera nonicteric; nasal mucosa normal; pharynx normal; tympanic " membranes and ear canals normal.  Neck: without JVD, thyromegaly, bruit, or adenopathy.  Lungs: clear to auscultation in all fields.  Heart: auscultation reveals regular rate and rhythm without murmur, rub, gallop, or click.  Abdomen: is soft and nontender, without hepato-splenomegaly, mass or hernia. Normal bowel sounds; .  Urologic exam: reveals normal male genitalia without testicular mass or penile/scrotal lesion.  Digital rectal exam and Prostate: deferred.  Extremities: are without clubbing, cyanosis, or edema.  Vascular: no signs of peripheral arterial disease or venous insufficiency/varicosities.  Neurological: intact without focal deficit, including cranial and peripheral nerves.  Station and gait observed to be normal during ingress and egress from the examination area.  Sensation and deep tendon reflexes tested if clinically indicated and are normal.  Musculoskeletal: exam is normal, without signs of synovitis, significant degeneration or deformity. Skin examination: without rash or significant lesions.    08/14/2018--routine diabetic foot exam reveals no evidence of diabetic foot ulcer or pre-ulcerative callus.  Distal pulses are palpable.  He has good hair growth on the feet and toes.  Sensation subjectively intact.    Lab/other results:    NMR reveals total cholesterol 116.  Triglycerides slightly elevated 168.  LDL particle number excellent at 593.  Small LDL particle number excellent at 4 and 17.  HDL particle number is normal at 31.9.  CMP normal except blood sugar 131, ALT slightly elevated at 65.  CBC normal except hemoglobin low at 12.5, hematocrit low at 38.  Albumin/creatinine ratio normal.  Hemoglobin A1c 8.35.  Thyroid function tests normal.  PSA normal.  Vitamin D normal.  CPK normal.    Assessment/Plan:     Diagnosis Plan   1. Routine physical examination     2. Type 2 diabetes mellitus without complication, without long-term current use of insulin (CMS/[facility])     3. Hyperlipidemia,  unspecified hyperlipidemia type     4. Hypothyroidism, unspecified type     5. History of colon polyps, 01/26/2015--tubular adenoma ×1.  Probable hyperplastic ×1.  Repeat due 3 years.     6. Benign essential hypertension     7. Diabetic foot exam     8. Chronic anemia     9. Elevated liver enzymes     10. Multiple environmental allergies     11. Therapeutic drug monitoring       Patient presents with essentially normal annual except for the following issues: He has type 2 diabetes that is not quite at goal.  His A1c was in the sevens a few months ago and therefore I would rather not add any more medication at this time.  Instead, I think diet and lifestyle changes should do the trick.  Hyperlipidemia is under excellent control.  Thyroid is therapeutic.  Patient has a history of colon polyps and needs a repeat colonoscopy.  Blood pressure is controlled.  Normal diabetic foot exam.  Chronic anemia is mild and stable.  Elevated liver enzymes are mild and stable.  Environmental allergies currently not much of an issue.    Plan is as follows: I recommended a low carbohydrate diet, weight loss, and exercise.  Colonoscopy ordered.  Encouraged patient to get the new shingles vaccination and also get his diabetic eye exam.  I will have him follow-up in 4 months with lab prior to reassess his situation.  Hopefully we won't have to add medication at that time but if we did certainly Victoza would be a good choice.        Procedures

## 2018-10-17 ENCOUNTER — PREP FOR SURGERY (OUTPATIENT)
Dept: OTHER | Facility: HOSPITAL | Age: 63
End: 2018-10-17

## 2018-10-17 DIAGNOSIS — Z86.010 HISTORY OF COLON POLYPS: Primary | ICD-10-CM

## 2018-10-30 RX ORDER — GLYBURIDE 5 MG/1
TABLET ORAL
Qty: 180 TABLET | Refills: 0 | Status: SHIPPED | OUTPATIENT
Start: 2018-10-30 | End: 2019-01-28 | Stop reason: SDUPTHER

## 2018-10-30 RX ORDER — SITAGLIPTIN AND METFORMIN HYDROCHLORIDE 1000; 50 MG/1; MG/1
TABLET, FILM COATED, EXTENDED RELEASE ORAL
Qty: 180 TABLET | Refills: 0 | Status: SHIPPED | OUTPATIENT
Start: 2018-10-30 | End: 2019-01-28 | Stop reason: SDUPTHER

## 2018-10-30 RX ORDER — LISINOPRIL 10 MG/1
TABLET ORAL
Qty: 90 TABLET | Refills: 0 | Status: SHIPPED | OUTPATIENT
Start: 2018-10-30 | End: 2019-01-28 | Stop reason: SDUPTHER

## 2018-10-30 RX ORDER — SIMVASTATIN 40 MG
TABLET ORAL
Qty: 90 TABLET | Refills: 0 | Status: SHIPPED | OUTPATIENT
Start: 2018-10-30 | End: 2019-01-28 | Stop reason: SDUPTHER

## 2018-12-05 DIAGNOSIS — E11.9 TYPE 2 DIABETES MELLITUS WITHOUT COMPLICATION, WITHOUT LONG-TERM CURRENT USE OF INSULIN (HCC): Chronic | ICD-10-CM

## 2018-12-05 DIAGNOSIS — E03.9 HYPOTHYROIDISM, UNSPECIFIED TYPE: Chronic | ICD-10-CM

## 2018-12-05 DIAGNOSIS — E78.5 HYPERLIPIDEMIA, UNSPECIFIED HYPERLIPIDEMIA TYPE: Chronic | ICD-10-CM

## 2018-12-10 LAB
ALBUMIN SERPL-MCNC: 4.5 G/DL (ref 3.5–5.2)
ALBUMIN/GLOB SERPL: 1.7 G/DL
ALP SERPL-CCNC: 78 U/L (ref 39–117)
ALT SERPL-CCNC: 42 U/L (ref 1–41)
AST SERPL-CCNC: 27 U/L (ref 1–40)
BILIRUB SERPL-MCNC: 0.5 MG/DL (ref 0.1–1.2)
BUN SERPL-MCNC: 16 MG/DL (ref 8–23)
BUN/CREAT SERPL: 12.3 (ref 7–25)
CALCIUM SERPL-MCNC: 9.3 MG/DL (ref 8.6–10.5)
CHLORIDE SERPL-SCNC: 106 MMOL/L (ref 98–107)
CHOLEST SERPL-MCNC: 114 MG/DL (ref 100–199)
CK SERPL-CCNC: 77 U/L (ref 20–200)
CO2 SERPL-SCNC: 23.6 MMOL/L (ref 22–29)
CREAT SERPL-MCNC: 1.3 MG/DL (ref 0.76–1.27)
GLOBULIN SER CALC-MCNC: 2.6 GM/DL
GLUCOSE SERPL-MCNC: 145 MG/DL (ref 65–99)
HBA1C MFR BLD: 7.4 % (ref 4.8–5.6)
HDL SERPL-SCNC: 33.5 UMOL/L
HDLC SERPL-MCNC: 46 MG/DL
LDL SERPL QN: 20.2 NM
LDL SERPL-SCNC: 544 NMOL/L
LDL SMALL SERPL-SCNC: 254 NMOL/L
LDLC SERPL CALC-MCNC: 46 MG/DL (ref 0–99)
POTASSIUM SERPL-SCNC: 4.2 MMOL/L (ref 3.5–5.2)
PROT SERPL-MCNC: 7.1 G/DL (ref 6–8.5)
SODIUM SERPL-SCNC: 143 MMOL/L (ref 136–145)
T3FREE SERPL-MCNC: 2.7 PG/ML (ref 2–4.4)
T4 FREE SERPL-MCNC: 1.84 NG/DL (ref 0.93–1.7)
TRIGL SERPL-MCNC: 108 MG/DL (ref 0–149)
TSH SERPL DL<=0.005 MIU/L-ACNC: 0.19 MIU/ML (ref 0.27–4.2)

## 2018-12-18 ENCOUNTER — HOSPITAL ENCOUNTER (OUTPATIENT)
Facility: HOSPITAL | Age: 63
Setting detail: HOSPITAL OUTPATIENT SURGERY
Discharge: HOME | End: 2018-12-18
Attending: SURGERY | Admitting: SURGERY

## 2018-12-18 ENCOUNTER — ANESTHESIA EVENT (OUTPATIENT)
Dept: GASTROENTEROLOGY | Facility: HOSPITAL | Age: 63
End: 2018-12-18

## 2018-12-18 ENCOUNTER — ANESTHESIA (OUTPATIENT)
Dept: GASTROENTEROLOGY | Facility: HOSPITAL | Age: 63
End: 2018-12-18

## 2018-12-18 VITALS
SYSTOLIC BLOOD PRESSURE: 110 MMHG | RESPIRATION RATE: 16 BRPM | HEART RATE: 74 BPM | BODY MASS INDEX: 27.53 KG/M2 | OXYGEN SATURATION: 99 % | TEMPERATURE: 98.4 F | DIASTOLIC BLOOD PRESSURE: 79 MMHG | WEIGHT: 197.31 LBS

## 2018-12-18 LAB — GLUCOSE BLDC GLUCOMTR-MCNC: 102 MG/DL (ref 70–130)

## 2018-12-18 PROCEDURE — 45378 DIAGNOSTIC COLONOSCOPY: CPT | Performed by: SURGERY

## 2018-12-18 PROCEDURE — 82962 GLUCOSE BLOOD TEST: CPT

## 2018-12-18 PROCEDURE — S0260 H&P FOR SURGERY: HCPCS | Performed by: SURGERY

## 2018-12-18 PROCEDURE — 25010000002 PROPOFOL 10 MG/ML EMULSION: Performed by: ANESTHESIOLOGY

## 2018-12-18 RX ORDER — SODIUM CHLORIDE, SODIUM LACTATE, POTASSIUM CHLORIDE, CALCIUM CHLORIDE 600; 310; 30; 20 MG/100ML; MG/100ML; MG/100ML; MG/100ML
1000 INJECTION, SOLUTION INTRAVENOUS CONTINUOUS
Status: DISCONTINUED | OUTPATIENT
Start: 2018-12-18 | End: 2018-12-18 | Stop reason: HOSPADM

## 2018-12-18 RX ORDER — PROPOFOL 10 MG/ML
VIAL (ML) INTRAVENOUS CONTINUOUS PRN
Status: DISCONTINUED | OUTPATIENT
Start: 2018-12-18 | End: 2018-12-18 | Stop reason: SURG

## 2018-12-18 RX ORDER — LIDOCAINE HYDROCHLORIDE 20 MG/ML
INJECTION, SOLUTION INFILTRATION; PERINEURAL AS NEEDED
Status: DISCONTINUED | OUTPATIENT
Start: 2018-12-18 | End: 2018-12-18 | Stop reason: SURG

## 2018-12-18 RX ORDER — PROPOFOL 10 MG/ML
VIAL (ML) INTRAVENOUS AS NEEDED
Status: DISCONTINUED | OUTPATIENT
Start: 2018-12-18 | End: 2018-12-18 | Stop reason: SURG

## 2018-12-18 RX ADMIN — EPHEDRINE SULFATE 10 MG: 50 INJECTION INTRAMUSCULAR; INTRAVENOUS; SUBCUTANEOUS at 08:17

## 2018-12-18 RX ADMIN — LIDOCAINE HYDROCHLORIDE 50 MG: 20 INJECTION, SOLUTION INFILTRATION; PERINEURAL at 08:08

## 2018-12-18 RX ADMIN — PROPOFOL 125 MG: 10 INJECTION, EMULSION INTRAVENOUS at 08:08

## 2018-12-18 RX ADMIN — PROPOFOL 140 MCG/KG/MIN: 10 INJECTION, EMULSION INTRAVENOUS at 08:08

## 2018-12-18 RX ADMIN — SODIUM CHLORIDE, POTASSIUM CHLORIDE, SODIUM LACTATE AND CALCIUM CHLORIDE 1000 ML: 600; 310; 30; 20 INJECTION, SOLUTION INTRAVENOUS at 07:23

## 2018-12-18 NOTE — DISCHARGE INSTRUCTIONS
Colonoscopy, Adult, Care After  DR PORTILLO [phone]  REPEAT COLONOSCOPY IN 3 YEARS  USE SUGAR FREE METAMUCIL 1 TSP BY MOUTH DAILY FOR THE REST OF  PATIENTS LIFE  This sheet gives you information about how to care for yourself after your procedure. Your health care provider may also give you more specific instructions. If you have problems or questions, contact your health care provider.  What can I expect after the procedure?  After the procedure, it is common to have:  · A small amount of blood in your stool for 24 hours after the procedure.  · Some gas.  · Mild abdominal cramping or bloating.    Follow these instructions at home:  General instructions    · For the first 24 hours after the procedure:  ? Do not drive or use machinery.  ? Do not sign important documents.  ? Do not drink alcohol.  ? Do your regular daily activities at a slower pace than normal.  ? Eat soft, easy-to-digest foods.  ? Rest often.  · Take over-the-counter or prescription medicines only as told by your health care provider.  · It is up to you to get the results of your procedure. Ask your health care provider, or the department performing the procedure, when your results will be ready.  Relieving cramping and bloating  · Try walking around when you have cramps or feel bloated.  · Apply heat to your abdomen as told by your health care provider. Use a heat source that your health care provider recommends, such as a moist heat pack or a heating pad.  ? Place a towel between your skin and the heat source.  ? Leave the heat on for 20-30 minutes.  ? Remove the heat if your skin turns bright red. This is especially important if you are unable to feel pain, heat, or cold. You may have a greater risk of getting burned.  Eating and drinking  · Drink enough fluid to keep your urine clear or pale yellow.  · Resume your normal diet as instructed by your health care provider. Avoid heavy or fried foods that are hard to digest.  · Avoid drinking alcohol  for as long as instructed by your health care provider.  Contact a health care provider if:  · You have blood in your stool 2-3 days after the procedure.  Get help right away if:  · You have more than a small spotting of blood in your stool.  · You pass large blood clots in your stool.  · Your abdomen is swollen.  · You have nausea or vomiting.  · You have a fever.  · You have increasing abdominal pain that is not relieved with medicine.  This information is not intended to replace advice given to you by your health care provider. Make sure you discuss any questions you have with your health care provider.  Document Released: 08/01/2005 Document Revised: 09/11/2017 Document Reviewed: 02/28/2017  [employer] Interactive Patient Education © 2018 [employer]  Diverticulosis  Diverticulosis is a condition that develops when small pouches (diverticula) form in the wall of the large intestine (colon). The colon is where water is absorbed and stool is formed. The pouches form when the inside layer of the colon pushes through weak spots in the outer layers of the colon. You may have a few pouches or many of them.  What are the causes?  The cause of this condition is not known.  What increases the risk?  The following factors may make you more likely to develop this condition:  · Being older than age 60. Your risk for this condition increases with age. Diverticulosis is rare among people younger than age 30. By age 80, many people have it.  · Eating a low-fiber diet.  · Having frequent constipation.  · Being overweight.  · Not getting enough exercise.  · Smoking.  · Taking over-the-counter pain medicines, like aspirin and ibuprofen.  · Having a family history of diverticulosis.    What are the signs or symptoms?  In most people, there are no symptoms of this condition. If you do have symptoms, they may include:  · Bloating.  · Cramps in the abdomen.  · Constipation or diarrhea.  · Pain in the lower left side of the  abdomen.    How is this diagnosed?  This condition is most often diagnosed during an exam for other colon problems. Because diverticulosis usually has no symptoms, it often cannot be diagnosed independently. This condition may be diagnosed by:  · Using a flexible scope to examine the colon (colonoscopy).  · Taking an X-ray of the colon after dye has been put into the colon (barium enema).  · Doing a CT scan.    How is this treated?  You may not need treatment for this condition if you have never developed an infection related to diverticulosis. If you have had an infection before, treatment may include:  · Eating a high-fiber diet. This may include eating more fruits, vegetables, and grains.  · Taking a fiber supplement.  · Taking a live bacteria supplement (probiotic).  · Taking medicine to relax your colon.  · Taking antibiotic medicines.    Follow these instructions at home:  · Drink 6-8 glasses of water or more each day to prevent constipation.  · Try not to strain when you have a bowel movement.  · If you have had an infection before:  ? Eat more fiber as directed by your health care provider or your diet and nutrition specialist (dietitian).  ? Take a fiber supplement or probiotic, if your health care provider approves.  · Take over-the-counter and prescription medicines only as told by your health care provider.  · If you were prescribed an antibiotic, take it as told by your health care provider. Do not stop taking the antibiotic even if you start to feel better.  · Keep all follow-up visits as told by your health care provider. This is important.  Contact a health care provider if:  · You have pain in your abdomen.  · You have bloating.  · You have cramps.  · You have not had a bowel movement in 3 days.  Get help right away if:  · Your pain gets worse.  · Your bloating becomes very bad.  · You have a fever or chills, and your symptoms suddenly get worse.  · You vomit.  · You have bowel movements that are  bloody or black.  · You have bleeding from your rectum.  Summary  · Diverticulosis is a condition that develops when small pouches (diverticula) form in the wall of the large intestine (colon).  · You may have a few pouches or many of them.  · This condition is most often diagnosed during an exam for other colon problems.  · If you have had an infection related to diverticulosis, treatment may include increasing the fiber in your diet, taking supplements, or taking medicines.  This information is not intended to replace advice given to you by your health care provider. Make sure you discuss any questions you have with your health care provider.  Document Released: 09/14/2005 Document Revised: 11/06/2017 Document Reviewed: 11/06/2017  [employer] Interactive Patient Education © 2017 [employer]  High-Fiber Diet  Fiber, also called dietary fiber, is a type of carbohydrate found in fruits, vegetables, whole grains, and beans. A high-fiber diet can have many health benefits. Your health care provider may recommend a high-fiber diet to help:  · Prevent constipation. Fiber can make your bowel movements more regular.  · Lower your cholesterol.  · Relieve hemorrhoids, uncomplicated diverticulosis, or irritable bowel syndrome.  · Prevent overeating as part of a weight-loss plan.  · Prevent heart disease, type 2 diabetes, and certain cancers.    What is my plan?  The recommended daily intake of fiber includes:  · 38 grams for men under age 50.  · 30 grams for men over age 50.  · 25 grams for women under age 50.  · 21 grams for women over age 50.    You can get the recommended daily intake of dietary fiber by eating a variety of fruits, vegetables, grains, and beans. Your health care provider may also recommend a fiber supplement if it is not possible to get enough fiber through your diet.  What do I need to know about a high-fiber diet?  · Fiber supplements have not been widely studied for their effectiveness, so it is better  to get fiber through food sources.  · Always check the fiber content on the nutrition facts label of any prepackaged food. Look for foods that contain at least 5 grams of fiber per serving.  · Ask your dietitian if you have questions about specific foods that are related to your condition, especially if those foods are not listed in the following section.  · Increase your daily fiber consumption gradually. Increasing your intake of dietary fiber too quickly may cause bloating, cramping, or gas.  · Drink plenty of water. Water helps you to digest fiber.  What foods can I eat?  Grains  Whole-grain breads. Multigrain cereal. Oats and oatmeal. Brown rice. Barley. Bulgur wheat. Millet. Bran muffins. Popcorn. Rye wafer crackers.  Vegetables  Sweet potatoes. Spinach. Kale. Artichokes. Cabbage. Broccoli. Green peas. Carrots. Squash.  Fruits  Berries. Pears. Apples. Oranges. Avocados. Prunes and raisins. Dried figs.  Meats and Other Protein Sources  Navy, kidney, spicer, and soy beans. Split peas. Lentils. Nuts and seeds.  Dairy  Fiber-fortified yogurt.  Beverages  Fiber-fortified soy milk. Fiber-fortified orange juice.  Other  Fiber bars.  The items listed above may not be a complete list of recommended foods or beverages. Contact your dietitian for more options.  What foods are not recommended?  Grains  White bread. Pasta made with refined flour. White rice.  Vegetables  Fried potatoes. Canned vegetables. Well-cooked vegetables.  Fruits  Fruit juice. Cooked, strained fruit.  Meats and Other Protein Sources  Fatty cuts of meat. Fried poultry or fried fish.  Dairy  Milk. Yogurt. Cream cheese. Sour cream.  Beverages  Soft drinks.  Other  Cakes and pastries. Butter and oils.  The items listed above may not be a complete list of foods and beverages to avoid. Contact your dietitian for more information.  What are some tips for including high-fiber foods in my diet?  · Eat a wide variety of high-fiber foods.  · Make sure that half  of all grains consumed each day are whole grains.  · Replace breads and cereals made from refined flour or white flour with whole-grain breads and cereals.  · Replace white rice with brown rice, bulgur wheat, or millet.  · Start the day with a breakfast that is high in fiber, such as a cereal that contains at least 5 grams of fiber per serving.  · Use beans in place of meat in soups, salads, or pasta.  · Eat high-fiber snacks, such as berries, raw vegetables, nuts, or popcorn.  This information is not intended to replace advice given to you by your health care provider. Make sure you discuss any questions you have with your health care provider.  Document Released: 12/18/2006 Document Revised: 05/25/2017 Document Reviewed: 06/02/2015  [employer] Interactive Patient Education © 2018 [employer]

## 2018-12-18 NOTE — ANESTHESIA POSTPROCEDURE EVALUATION
Patient: Lionel Carson    Procedure Summary     Date:  12/18/18 Room / Location:   [facility]    Anesthesia Start:  0804 Anesthesia Stop:  0829    Procedure:  COLONOSCOPY into cecum and TI (N/A ) Diagnosis:       History of colon polyps      (History of colon polyps [Z86.010])    Surgeon:  Kuldeep Chambers MD Provider:  Brennen Thayer MD    Anesthesia Type:  MAC ASA Status:  3          Anesthesia Type: MAC  Last vitals  BP   97/63 (12/18/18 0833)   Temp   36.7 °C (98.1 °F) (12/18/18 0714)   Pulse   72 (12/18/18 0833)   Resp   16 (12/18/18 0833)     SpO2   98 % (12/18/18 0833)     Post Anesthesia Care and Evaluation    Patient location during evaluation: bedside  Patient participation: complete - patient participated  Level of consciousness: awake and alert  Pain score: 0  Pain management: adequate  Airway patency: patent  Anesthetic complications: No anesthetic complications  PONV Status: none  Cardiovascular status: acceptable  Respiratory status: acceptable  Hydration status: acceptable

## 2018-12-18 NOTE — H&P
Reason for Visit: Surveillance colonoscopy    HPI:  Patient presents for evaluation for colon cancer surveillance.  There is a family history of colon cancer in his grandfather at age of 70.  Patient denies changes in bowel habits, diarrhea, constipation, abdominal pain, decreased caliber of stool, melena, brbpr and weight loss.  There is no personal or family history of bleeding disorder or untoward reaction to anesthesia.  Patient has had a colonoscopy 3 year(s) ago.      Past Medical History:   Diagnosis Date   • Benign essential hypertension 9/7/2007 09/07/2007--initial treatment with lisinopril that was primarily for renal protection.   • Chronic anemia 8/3/2017    08/03/2017--routine physical.  Hemoglobin is low at 13.3.  Hematocrit low at 40.0.  Indices are normal.  Repeat CBC, iron studies, homocystine, and methylmalonic acid ordered.  Patient will follow-up on the phone for the results and possible further instructions.  He is aware that it takes 2 weeks for one lab test to return.   • Diabetic eye exam (CMS/[facility]) 3/17/2017    03/17/2017--routine diabetic eye exam reveals no evidence of diabetic retinopathy.  Cataracts both eyes.  Observation recommended.  07/09/2014--routine ophthalmologic examination reveals no evidence of diabetic retinopathy.     02/02/2009--eye examination revealed no evidence of diabetic retinopathy, ocular hypertension noted OD.   • Diabetic foot exam 8/3/2017    08/03/2017--routine diabetic foot exam reveals no evidence of diabetic foot ulcer or pre-ulcerative callus.  Distal pulses are very palpable and there is no signs of ischemia.  Sensation subjectively intact.   • Diverticulosis of colon 1/26/2015 01/26/2015--colonoscopy revealed extensive internal and external hemorrhoids. The patient had 2 sessile polyps, one at the splenic flexure and the other in the ascending colon near the hepatic flexure which were removed and sent for pathology. Left colon diverticulosis also  noted. The ascending colon polyp returned tubular adenoma. The splenic flexure polyp was markedly cauterized and partially denuded. Focal hyperplastic features. No definite evidence of adenomatous change. Repeat study due 3 years.   • Elevated liver enzymes 8/3/2017    08/03/2017--routine physical.  AST elevated at 48.  ALT elevated at 61.  Suspect HEADLEY.  Hepatitis C antibody screening is negative.  We will repeat CMP in a few weeks and if the liver enzymes remain elevated then we will proceed with a liver ultrasound.   • History of anemia 02/04/2015 12/06/2015--hemoglobin normal at 13.2, hematocrit normal at 39.5. Resolve this issue.   02/04/2015--homocystine and methylmalonic acid are normal. Serum iron low normal at 62. Iron saturation low at 16%. Colonoscopy ordered and returned one hyperplastic polyp and one tubular adenomatous polyp.   01/25/2015--routine CBC reveals a hemoglobin low at 13.1, hematocrit low at 40.3. Homocystine, methylma   • History of colon polyps, 01/26/2015--tubular adenoma ×1.  Probable hyperplastic ×1.  Repeat due 3 years. 1/26/2015 01/26/2015--colonoscopy revealed extensive internal and external hemorrhoids. The patient had 2 sessile polyps, one at the splenic flexure and the other in the ascending colon near the hepatic flexure which were removed and sent for pathology. Left colon diverticulosis also noted. The ascending colon polyp returned tubular adenoma. The splenic flexure polyp was markedly cauterized and partially denuded. Focal hyperplastic features. No definite evidence of adenomatous change. Repeat study due 3 years.   • Hyperlipidemia 9/7/2007 09/07/2007--initial treatment hyperlipidemia.   • Hypothyroidism 6/18/2008 06/18/2008--initial diagnosis and treatment hypothyroidism.   • Multiple environmental allergies 5/17/2016    Patient had allergy testing as a child.   • Type 2 diabetes mellitus (CMS/HCC) 7/17/2006 July 2006--initial diagnosis diabetes.   •  Vitamin D deficiency 6/16/2016       Past Surgical History:   Procedure Laterality Date   • COLONOSCOPY  01/26/2015 01/26/2015--colonoscopy revealed extensive internal and external hemorrhoids. The patient had 2 sessile polyps, one at the splenic flexure and the other in the ascending colon near the hepatic flexure which were removed and sent for pathology. Left colon diverticulosis also noted. The ascending colon polyp returned tubular adenoma. The splenic flexure polyp was markedly cauterized and partially de         Current Facility-Administered Medications:   •  lactated ringers infusion 1,000 mL, 1,000 mL, Intravenous, Continuous, Kuldeep Chambers MD, Last Rate: 25 mL/hr at 12/18/18 0723, 1,000 mL at 12/18/18 0723    No Known Allergies    Family History   Problem Relation Age of Onset   • Diabetes Mother         Type 2       Social History     Socioeconomic History   • Marital status:      Spouse name: Not on file   • Number of children: Not on file   • Years of education: Not on file   • Highest education level: Not on file   Social Needs   • Financial resource strain: Not on file   • Food insecurity - worry: Not on file   • Food insecurity - inability: Not on file   • Transportation needs - medical: Not on file   • Transportation needs - non-medical: Not on file   Occupational History   • 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    Objective:   Physical exam:  /71 (BP Location: Left arm, Patient Position: Lying)   Pulse 70   Temp 98.1 °F (36.7 °C) (Oral)   Resp 12   Wt 89.5 kg (197 lb 5 oz)   SpO2 95%   BMI 27.53 kg/m²      General Appearance:  awake, alert, oriented, in no acute distress and well developed, well nourished  Lungs:  Normal expansion.  Clear to auscultation.  No rales, rhonchi, or wheezing.  Heart:  Heart sounds are normal.  Regular rate and rhythm without murmur, gallop or rub.  Abdomen:  Soft, non-tender, normal bowel sounds; no bruits, organomegaly or masses.    Assessment:    Lionel Carson is a 63 y.o. male who presents for evaluation for colon cancer surveillance.    Patient has increased risk factors or warning signs or symptoms.  I reviewed current ACG guidelines for colon cancer screening:    A)  Flex sig q 5yrs with yearly fecal occult blood testing  B)  Virtual colonoscopy  C)  Colonoscopy with possible biopsy/polypectomy with IV sedation at appropriate       screening intervals    The patient has a family history of colon cancer.  He  has a personal history of colon polyp who presents for colon cancer surveillance evaluation.   I would advise a colonscopy.     The rationale for each option as well as all risks and benefits of each alternative were discussed with the patient in detail.  The patient understands and does wish to proceed with Colonoscopy, Diagnostic        The rationale for colonoscopy with possible biopsy, possible polypectomy, with IV sedation as well as all risks, benefits, and alternatives were discussed with the patient in detail. Risks including but not limited to perforation, bleeding,need for blood transfusion or emergent surgery ,and missed neoplasm were reviewed in detail with the patient The patient demonstrates full understanding and wishes to proceed with the colonoscopy.

## 2018-12-19 ENCOUNTER — OFFICE VISIT (OUTPATIENT)
Dept: INTERNAL MEDICINE | Facility: CLINIC | Age: 63
End: 2018-12-19

## 2018-12-19 VITALS
BODY MASS INDEX: 27.58 KG/M2 | DIASTOLIC BLOOD PRESSURE: 64 MMHG | HEART RATE: 84 BPM | HEIGHT: 71 IN | SYSTOLIC BLOOD PRESSURE: 112 MMHG | WEIGHT: 197 LBS | OXYGEN SATURATION: 98 %

## 2018-12-19 DIAGNOSIS — Z00.00 ROUTINE PHYSICAL EXAMINATION: ICD-10-CM

## 2018-12-19 DIAGNOSIS — R74.8 ELEVATED LIVER ENZYMES: Chronic | ICD-10-CM

## 2018-12-19 DIAGNOSIS — I10 BENIGN ESSENTIAL HYPERTENSION: Chronic | ICD-10-CM

## 2018-12-19 DIAGNOSIS — Z51.81 THERAPEUTIC DRUG MONITORING: ICD-10-CM

## 2018-12-19 DIAGNOSIS — Z86.010 HISTORY OF COLON POLYPS: Chronic | ICD-10-CM

## 2018-12-19 DIAGNOSIS — E11.9 DIABETIC EYE EXAM (HCC): Chronic | ICD-10-CM

## 2018-12-19 DIAGNOSIS — E03.9 HYPOTHYROIDISM, UNSPECIFIED TYPE: Chronic | ICD-10-CM

## 2018-12-19 DIAGNOSIS — E55.9 VITAMIN D DEFICIENCY: Chronic | ICD-10-CM

## 2018-12-19 DIAGNOSIS — E11.9 TYPE 2 DIABETES MELLITUS WITHOUT COMPLICATION, WITHOUT LONG-TERM CURRENT USE OF INSULIN (HCC): Primary | Chronic | ICD-10-CM

## 2018-12-19 DIAGNOSIS — E78.5 HYPERLIPIDEMIA, UNSPECIFIED HYPERLIPIDEMIA TYPE: Chronic | ICD-10-CM

## 2018-12-19 DIAGNOSIS — D64.9 CHRONIC ANEMIA: Chronic | ICD-10-CM

## 2018-12-19 DIAGNOSIS — Z01.00 DIABETIC EYE EXAM (HCC): Chronic | ICD-10-CM

## 2018-12-19 PROCEDURE — 99214 OFFICE O/P EST MOD 30 MIN: CPT | Performed by: INTERNAL MEDICINE

## 2018-12-19 NOTE — PROGRESS NOTES
12/19/2018    Patient Information  Lionel Carson                                                                                          [address] [place] [id number]      1955  [unfilled]  There is no work phone number on file.    Chief Complaint:     Follow-up type 2 diabetes, hyperlipidemia, hypothyroidism, chronic anemia, elevated liver enzymes, vitamin D deficiency, hypertension, history of colon polyps and recent colonoscopy.  No new acute complaints.    History of Present Illness:    Patient with a history of medical problems as outlined in chief complaint presents today for a follow-up with lab prior in order to monitor his chronic medical issues.  He currently feels well and has no new acute complaints.  He had a colonoscopy yesterday which we will review.  His past medical history reviewed and updated where necessary including health maintenance parameters.  This reveals he is up-to-date or else accounted for.    Review of Systems   Constitution: Negative.   HENT: Negative.    Eyes: Negative.    Cardiovascular: Negative.    Respiratory: Negative.    Endocrine: Negative.    Hematologic/Lymphatic: Negative.    Skin: Negative.    Musculoskeletal: Negative.    Gastrointestinal: Negative.    Genitourinary: Negative.    Neurological: Negative.    Psychiatric/Behavioral: Negative.    Allergic/Immunologic: Negative.        Active Problems:    Patient Active Problem List   Diagnosis   • Allergic rhinitis   • Benign essential hypertension   • History of colon polyps, 12/18/2018--negative.  01/26/2015--tubular adenoma ×1.  Probable hyperplastic ×1.  Repeat due 3 years.   • Diverticulosis of colon   • Hyperlipidemia   • Hypothyroidism   • Multiple environmental allergies   • Type 2 diabetes mellitus (CMS/HCC)   • Vitamin D deficiency   • Therapeutic drug monitoring   • Routine physical examination   • Diabetic foot exam   • Diabetic eye exam (CMS/HCC)   • Chronic anemia   •  Elevated liver enzymes         Past Medical History:   Diagnosis Date   • Benign essential hypertension 9/7/2007 09/07/2007--initial treatment with lisinopril that was primarily for renal protection.   • Chronic anemia 8/3/2017    08/03/2017--routine physical.  Hemoglobin is low at 13.3.  Hematocrit low at 40.0.  Indices are normal.  Repeat CBC, iron studies, homocystine, and methylmalonic acid ordered.  Patient will follow-up on the phone for the results and possible further instructions.  He is aware that it takes 2 weeks for one lab test to return.   • Diabetic eye exam (CMS/[facility]) 3/17/2017    03/17/2017--routine diabetic eye exam reveals no evidence of diabetic retinopathy.  Cataracts both eyes.  Observation recommended.  07/09/2014--routine ophthalmologic examination reveals no evidence of diabetic retinopathy.     02/02/2009--eye examination revealed no evidence of diabetic retinopathy, ocular hypertension noted OD.   • Diabetic foot exam 8/3/2017    08/03/2017--routine diabetic foot exam reveals no evidence of diabetic foot ulcer or pre-ulcerative callus.  Distal pulses are very palpable and there is no signs of ischemia.  Sensation subjectively intact.   • Diverticulosis of colon 1/26/2015 01/26/2015--colonoscopy revealed extensive internal and external hemorrhoids. The patient had 2 sessile polyps, one at the splenic flexure and the other in the ascending colon near the hepatic flexure which were removed and sent for pathology. Left colon diverticulosis also noted. The ascending colon polyp returned tubular adenoma. The splenic flexure polyp was markedly cauterized and partially denuded. Focal hyperplastic features. No definite evidence of adenomatous change. Repeat study due 3 years.   • Elevated liver enzymes 8/3/2017    08/03/2017--routine physical.  AST elevated at 48.  ALT elevated at 61.  Suspect HEADLEY.  Hepatitis C antibody screening is negative.  We will repeat CMP in a few weeks and if the  liver enzymes remain elevated then we will proceed with a liver ultrasound.   • History of anemia 02/04/2015 12/06/2015--hemoglobin normal at 13.2, hematocrit normal at 39.5. Resolve this issue.   02/04/2015--homocystine and methylmalonic acid are normal. Serum iron low normal at 62. Iron saturation low at 16%. Colonoscopy ordered and returned one hyperplastic polyp and one tubular adenomatous polyp.   01/25/2015--routine CBC reveals a hemoglobin low at 13.1, hematocrit low at 40.3. Homocystine, methylma   • History of colon polyps, 12/18/2018--negative.  01/26/2015--tubular adenoma ×1.  Probable hyperplastic ×1.  Repeat due 3 years. 1/26/2015 12/18/2018--colonoscopy revealed multiple small and large mouth diverticula in the sigmoid and descending colon.  There was evidence of an impacted diverticulum.  No bleeding.  Nonbleeding internal hemorrhoids noted.  Grade 3.  No polyps noted.  No specimens taken.  01/26/2015--colonoscopy revealed extensive internal and external hemorrhoids. The patient had 2 sessile polyps, one at the splenic fl   • Hyperlipidemia 9/7/2007 09/07/2007--initial treatment hyperlipidemia.   • Hypothyroidism 6/18/2008 06/18/2008--initial diagnosis and treatment hypothyroidism.   • Multiple environmental allergies 5/17/2016    Patient had allergy testing as a child.   • Type 2 diabetes mellitus (CMS/[facility]) 7/17/2006 July 2006--initial diagnosis diabetes.   • Vitamin D deficiency 6/16/2016         Past Surgical History:   Procedure Laterality Date   • COLONOSCOPY  01/26/2015 01/26/2015--colonoscopy revealed extensive internal and external hemorrhoids. The patient had 2 sessile polyps, one at the splenic flexure and the other in the ascending colon near the hepatic flexure which were removed and sent for pathology. Left colon diverticulosis also noted. The ascending colon polyp returned tubular adenoma. The splenic flexure polyp was markedly cauterized and partially de   •  COLONOSCOPY N/A 12/18/2018 12/18/2018--colonoscopy revealed multiple small and large mouth diverticula in the sigmoid and descending colon.  There was evidence of an impacted diverticulum.  No bleeding.  Nonbleeding internal hemorrhoids noted.  Grade 3.  No polyps noted.  No specimens taken.         No Known Allergies        Current Outpatient Medications:   •  aspirin 81 MG EC tablet, Take 1 tablet by mouth daily., Disp: , Rfl:   •  Cholecalciferol (VITAMIN D) 2000 UNITS capsule, Take 1 capsule by mouth daily., Disp: , Rfl:   •  glyBURIDE (DIAbeta) 5 MG tablet, TAKE 1 TABLET TWICE A DAY WITH MEALS, Disp: 180 tablet, Rfl: 0  •  JANUMET XR  MG tablet, TAKE 1 TABLET TWICE A DAY, Disp: 180 tablet, Rfl: 0  •  levothyroxine (SYNTHROID) 125 MCG tablet, 1 by mouth daily for thyroid, Disp: 90 tablet, Rfl: 3  •  lisinopril (PRINIVIL,ZESTRIL) 10 MG tablet, TAKE 1 TABLET DAILY, Disp: 90 tablet, Rfl: 0  •  simvastatin (ZOCOR) 40 MG tablet, TAKE 1 TABLET EVERY NIGHT, Disp: 90 tablet, Rfl: 0  No current facility-administered medications for this visit.       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  No acute distress.  Normal affect.  HEENT: Pupils equal, round, reactive to light; extraocular movements intact; sclerae nonicteric; pharynx, ear canals and TMs normal.  Neck: Without JVD, thyromegaly, bruit, or adenopathy.  Lungs: Clear to auscultation in all fields.  Heart: Regular rate and rhythm without murmur, rub, gallop, or click.  Abdomen: Soft, nontender, without hepatosplenomegaly or hernia.  Bowel sounds normal.  : Deferred.  Rectal: Deferred.  Extremities: Without clubbing, cyanosis, edema, or pulse deficit.  Neurologic: Intact without focal deficit.  Normal station and gait observed during ingress and egress from the examination room.  Skin: Without significant lesion.  Musculoskeletal: Unremarkable.    Lab/other results:    NMR is nearly perfect.  CMP normal except blood sugar 145, creatinine slightly elevated at 1.3, ALT slightly elevated at 42.  Hemoglobin A1c 7.4.  TSH is suppressed at 0.191.  Free T4 is mildly elevated at 1.84.  Free T3 is normal.  CPK normal.    Assessment/Plan:     Diagnosis Plan   1. Type 2 diabetes mellitus without complication, without long-term current use of insulin (CMS/[facility])     2. Hyperlipidemia, unspecified hyperlipidemia type     3. Hypothyroidism, unspecified type     4. Diabetic eye exam (CMS/[facility])     5. Chronic anemia     6. Elevated liver enzymes     7. Vitamin D deficiency     8. Benign essential hypertension     9. History of colon polyps, 01/26/2015--tubular adenoma ×1.  Probable hyperplastic ×1.  Repeat due 3 years.     10. Therapeutic drug monitoring     11. Routine physical examination       Patient has type 2 diabetes is under good control.  Hyperlipidemia under good control.  His TSH is suppressed but I will not make any changes at this time based " on the fact that his previous TSH was normal.  Patient is scheduled for diabetic eye exam next week.  Chronic anemia has been stable and we will continue to monitor.  Elevated liver enzymes are very mild and stable.  Vitamin D therapeutic.  Blood pressure well controlled.  Patient has history of colon polyps and had a negative colonoscopy except for diverticulosis just yesterday.    Plan is as follows: No change in current medical regimen.  Recommend a hepatitis A and the new shingles vaccine.  I will have patient follow-up after 08/15/2019 with lab prior for his annual exam, or follow-up as needed.        Procedures

## 2019-01-28 RX ORDER — SIMVASTATIN 40 MG
TABLET ORAL
Qty: 90 TABLET | Refills: 2 | Status: SHIPPED | OUTPATIENT
Start: 2019-01-28 | End: 2019-11-06 | Stop reason: SDUPTHER

## 2019-01-28 RX ORDER — GLYBURIDE 5 MG/1
TABLET ORAL
Qty: 180 TABLET | Refills: 2 | Status: SHIPPED | OUTPATIENT
Start: 2019-01-28 | End: 2019-11-06 | Stop reason: SDUPTHER

## 2019-01-28 RX ORDER — LISINOPRIL 10 MG/1
TABLET ORAL
Qty: 90 TABLET | Refills: 2 | Status: SHIPPED | OUTPATIENT
Start: 2019-01-28 | End: 2019-11-06 | Stop reason: SDUPTHER

## 2019-01-28 RX ORDER — SITAGLIPTIN AND METFORMIN HYDROCHLORIDE 1000; 50 MG/1; MG/1
TABLET, FILM COATED, EXTENDED RELEASE ORAL
Qty: 180 TABLET | Refills: 2 | Status: SHIPPED | OUTPATIENT
Start: 2019-01-28 | End: 2019-11-06 | Stop reason: SDUPTHER

## 2019-07-10 DIAGNOSIS — E03.9 HYPOTHYROIDISM, UNSPECIFIED TYPE: Chronic | ICD-10-CM

## 2019-07-10 RX ORDER — LEVOTHYROXINE SODIUM 0.12 MG/1
TABLET ORAL
Qty: 90 TABLET | Refills: 0 | Status: SHIPPED | OUTPATIENT
Start: 2019-07-10 | End: 2019-11-06 | Stop reason: SDUPTHER

## 2019-08-08 DIAGNOSIS — E11.9 TYPE 2 DIABETES MELLITUS WITHOUT COMPLICATION, WITHOUT LONG-TERM CURRENT USE OF INSULIN (HCC): Chronic | ICD-10-CM

## 2019-08-08 DIAGNOSIS — E03.9 HYPOTHYROIDISM, UNSPECIFIED TYPE: Chronic | ICD-10-CM

## 2019-08-08 DIAGNOSIS — Z00.00 ROUTINE PHYSICAL EXAMINATION: ICD-10-CM

## 2019-08-08 DIAGNOSIS — E55.9 VITAMIN D DEFICIENCY: Chronic | ICD-10-CM

## 2019-08-08 DIAGNOSIS — E78.5 HYPERLIPIDEMIA, UNSPECIFIED HYPERLIPIDEMIA TYPE: Chronic | ICD-10-CM

## 2019-08-10 LAB
25(OH)D3+25(OH)D2 SERPL-MCNC: 62.7 NG/ML (ref 30–100)
ALBUMIN SERPL-MCNC: 4.1 G/DL (ref 3.5–5.2)
ALBUMIN/CREAT UR: 6.1 MG/G CREAT (ref 0–30)
ALBUMIN/GLOB SERPL: 1.5 G/DL
ALP SERPL-CCNC: 84 U/L (ref 39–117)
ALT SERPL-CCNC: 41 U/L (ref 1–41)
AST SERPL-CCNC: 30 U/L (ref 1–40)
BILIRUB SERPL-MCNC: 0.8 MG/DL (ref 0.2–1.2)
BUN SERPL-MCNC: 18 MG/DL (ref 8–23)
BUN/CREAT SERPL: 13.5 (ref 7–25)
CALCIUM SERPL-MCNC: 8.9 MG/DL (ref 8.6–10.5)
CHLORIDE SERPL-SCNC: 103 MMOL/L (ref 98–107)
CHOLEST SERPL-MCNC: 112 MG/DL (ref 100–199)
CK SERPL-CCNC: 98 U/L (ref 20–200)
CO2 SERPL-SCNC: 23.1 MMOL/L (ref 22–29)
CREAT SERPL-MCNC: 1.33 MG/DL (ref 0.76–1.27)
CREAT UR-MCNC: 198.3 MG/DL
GLOBULIN SER CALC-MCNC: 2.8 GM/DL
GLUCOSE SERPL-MCNC: 214 MG/DL (ref 65–99)
HBA1C MFR BLD: 9.7 % (ref 4.8–5.6)
HDL SERPL-SCNC: 30.8 UMOL/L
HDLC SERPL-MCNC: 37 MG/DL
LDL SERPL QN: 20.4 NM
LDL SERPL-SCNC: 590 NMOL/L
LDL SMALL SERPL-SCNC: 288 NMOL/L
LDLC SERPL CALC-MCNC: 38 MG/DL (ref 0–99)
MICROALBUMIN UR-MCNC: 12 UG/ML
POTASSIUM SERPL-SCNC: 4.2 MMOL/L (ref 3.5–5.2)
PROT SERPL-MCNC: 6.9 G/DL (ref 6–8.5)
PSA SERPL-MCNC: 0.17 NG/ML (ref 0–4)
SODIUM SERPL-SCNC: 139 MMOL/L (ref 136–145)
T3FREE SERPL-MCNC: 2.7 PG/ML (ref 2–4.4)
T4 FREE SERPL-MCNC: 1.7 NG/DL (ref 0.93–1.7)
TRIGL SERPL-MCNC: 187 MG/DL (ref 0–149)
TSH SERPL DL<=0.005 MIU/L-ACNC: 0.34 MIU/ML (ref 0.27–4.2)

## 2019-08-16 ENCOUNTER — OFFICE VISIT (OUTPATIENT)
Dept: INTERNAL MEDICINE | Facility: CLINIC | Age: 64
End: 2019-08-16

## 2019-08-16 VITALS
OXYGEN SATURATION: 99 % | WEIGHT: 203.8 LBS | HEIGHT: 71 IN | SYSTOLIC BLOOD PRESSURE: 108 MMHG | HEART RATE: 78 BPM | BODY MASS INDEX: 28.53 KG/M2 | DIASTOLIC BLOOD PRESSURE: 62 MMHG

## 2019-08-16 DIAGNOSIS — R74.8 ELEVATED LIVER ENZYMES: Chronic | ICD-10-CM

## 2019-08-16 DIAGNOSIS — E55.9 VITAMIN D DEFICIENCY: Chronic | ICD-10-CM

## 2019-08-16 DIAGNOSIS — Z51.81 THERAPEUTIC DRUG MONITORING: ICD-10-CM

## 2019-08-16 DIAGNOSIS — I10 BENIGN ESSENTIAL HYPERTENSION: Chronic | ICD-10-CM

## 2019-08-16 DIAGNOSIS — E78.5 HYPERLIPIDEMIA, UNSPECIFIED HYPERLIPIDEMIA TYPE: Chronic | ICD-10-CM

## 2019-08-16 DIAGNOSIS — J30.1 CHRONIC SEASONAL ALLERGIC RHINITIS DUE TO POLLEN: Chronic | ICD-10-CM

## 2019-08-16 DIAGNOSIS — D64.9 CHRONIC ANEMIA: Chronic | ICD-10-CM

## 2019-08-16 DIAGNOSIS — Z86.010 HISTORY OF COLON POLYPS: Chronic | ICD-10-CM

## 2019-08-16 DIAGNOSIS — E03.9 HYPOTHYROIDISM, UNSPECIFIED TYPE: Chronic | ICD-10-CM

## 2019-08-16 DIAGNOSIS — Z91.09 MULTIPLE ENVIRONMENTAL ALLERGIES: Chronic | ICD-10-CM

## 2019-08-16 DIAGNOSIS — E11.9 ENCOUNTER FOR DIABETIC FOOT EXAM (HCC): Chronic | ICD-10-CM

## 2019-08-16 DIAGNOSIS — Z00.00 ROUTINE PHYSICAL EXAMINATION: Primary | ICD-10-CM

## 2019-08-16 DIAGNOSIS — E11.9 TYPE 2 DIABETES MELLITUS WITHOUT COMPLICATION, WITHOUT LONG-TERM CURRENT USE OF INSULIN (HCC): Chronic | ICD-10-CM

## 2019-08-16 PROCEDURE — 99396 PREV VISIT EST AGE 40-64: CPT | Performed by: INTERNAL MEDICINE

## 2019-08-16 NOTE — PROGRESS NOTES
08/16/2019    Patient Information  Lionel Carson                                                                                          [address] [place] [id number]      1955  [unfilled]  There is no work phone number on file.    Chief Complaint:     Routine annual physical examination and follow-up lab work.    History of Present Illness:    Patient with history of type 2 diabetes, hyperlipidemia, hypothyroidism, history of colon polyps, hypertension, environmental allergies/allergic rhinitis, vitamin D deficiency, chronic anemia and elevated liver enzymes.  He presents today for his routine annual physical exam.  He has no new acute complaints.  His past medical history reviewed and updated were necessary including health maintenance parameters.  This reveals he will be up-to-date after today's visit with the exception of the new shingles vaccine and I have encouraged him to contact his insurance company to see if it is a covered benefit here in the office.  If so, he should come into the office and have this performed.  He is aware to the 2 shot vaccination.    Review of Systems   Constitution: Negative.   HENT: Negative.    Eyes: Negative.    Cardiovascular: Negative.    Respiratory: Negative.    Endocrine: Negative.    Hematologic/Lymphatic: Negative.    Skin: Negative.    Musculoskeletal: Negative.    Gastrointestinal: Negative.    Genitourinary: Negative.    Neurological: Negative.    Psychiatric/Behavioral: Negative.    Allergic/Immunologic: Negative.        Active Problems:    Patient Active Problem List   Diagnosis   • Allergic rhinitis   • Benign essential hypertension   • History of colon polyps, 12/18/2018--negative.  01/26/2015--tubular adenoma ×1.  Probable hyperplastic ×1.  Repeat due 3 years.   • Diverticulosis of colon   • Hyperlipidemia   • Hypothyroidism   • Multiple environmental allergies   • Type 2 diabetes mellitus (CMS/HCC)   • Vitamin D deficiency   •  Therapeutic drug monitoring   • Routine physical examination   • Diabetic foot exam   • Diabetic eye exam (CMS/[facility])   • Chronic anemia   • Elevated liver enzymes         Past Medical History:   Diagnosis Date   • Benign essential hypertension 9/7/2007 09/07/2007--initial treatment with lisinopril that was primarily for renal protection.   • Chronic anemia 8/3/2017    08/03/2017--routine physical.  Hemoglobin is low at 13.3.  Hematocrit low at 40.0.  Indices are normal.  Repeat CBC, iron studies, homocystine, and methylmalonic acid ordered.  Patient will follow-up on the phone for the results and possible further instructions.  He is aware that it takes 2 weeks for one lab test to return.   • Diabetic eye exam (CMS/[facility]) 3/17/2017    03/17/2017--routine diabetic eye exam reveals no evidence of diabetic retinopathy.  Cataracts both eyes.  Observation recommended.  07/09/2014--routine ophthalmologic examination reveals no evidence of diabetic retinopathy.     02/02/2009--eye examination revealed no evidence of diabetic retinopathy, ocular hypertension noted OD.   • Diabetic foot exam 8/3/2017    08/03/2017--routine diabetic foot exam reveals no evidence of diabetic foot ulcer or pre-ulcerative callus.  Distal pulses are very palpable and there is no signs of ischemia.  Sensation subjectively intact.   • Diverticulosis of colon 1/26/2015 01/26/2015--colonoscopy revealed extensive internal and external hemorrhoids. The patient had 2 sessile polyps, one at the splenic flexure and the other in the ascending colon near the hepatic flexure which were removed and sent for pathology. Left colon diverticulosis also noted. The ascending colon polyp returned tubular adenoma. The splenic flexure polyp was markedly cauterized and partially denuded. Focal hyperplastic features. No definite evidence of adenomatous change. Repeat study due 3 years.   • Elevated liver enzymes 8/3/2017    08/03/2017--routine physical.  AST elevated  at 48.  ALT elevated at 61.  Suspect HEADLEY.  Hepatitis C antibody screening is negative.  We will repeat CMP in a few weeks and if the liver enzymes remain elevated then we will proceed with a liver ultrasound.   • History of anemia 02/04/2015 12/06/2015--hemoglobin normal at 13.2, hematocrit normal at 39.5. Resolve this issue.   02/04/2015--homocystine and methylmalonic acid are normal. Serum iron low normal at 62. Iron saturation low at 16%. Colonoscopy ordered and returned one hyperplastic polyp and one tubular adenomatous polyp.   01/25/2015--routine CBC reveals a hemoglobin low at 13.1, hematocrit low at 40.3. Homocystine, methylma   • History of colon polyps, 12/18/2018--negative.  01/26/2015--tubular adenoma ×1.  Probable hyperplastic ×1.  Repeat due 3 years. 1/26/2015 12/18/2018--colonoscopy revealed multiple small and large mouth diverticula in the sigmoid and descending colon.  There was evidence of an impacted diverticulum.  No bleeding.  Nonbleeding internal hemorrhoids noted.  Grade 3.  No polyps noted.  No specimens taken.  01/26/2015--colonoscopy revealed extensive internal and external hemorrhoids. The patient had 2 sessile polyps, one at the splenic fl   • Hyperlipidemia 9/7/2007 09/07/2007--initial treatment hyperlipidemia.   • Hypothyroidism 6/18/2008 06/18/2008--initial diagnosis and treatment hypothyroidism.   • Multiple environmental allergies 5/17/2016    Patient had allergy testing as a child.   • Type 2 diabetes mellitus (CMS/HCC) 7/17/2006 July 2006--initial diagnosis diabetes.   • Vitamin D deficiency 6/16/2016         Past Surgical History:   Procedure Laterality Date   • COLONOSCOPY  01/26/2015 01/26/2015--colonoscopy revealed extensive internal and external hemorrhoids. The patient had 2 sessile polyps, one at the splenic flexure and the other in the ascending colon near the hepatic flexure which were removed and sent for pathology. Left colon diverticulosis also noted.  The ascending colon polyp returned tubular adenoma. The splenic flexure polyp was markedly cauterized and partially de   • COLONOSCOPY N/A 12/18/2018 12/18/2018--colonoscopy revealed multiple small and large mouth diverticula in the sigmoid and descending colon.  There was evidence of an impacted diverticulum.  No bleeding.  Nonbleeding internal hemorrhoids noted.  Grade 3.  No polyps noted.  No specimens taken.         No Known Allergies        Current Outpatient Medications:   •  aspirin 81 MG EC tablet, Take 1 tablet by mouth daily., Disp: , Rfl:   •  Cholecalciferol (VITAMIN D) 2000 UNITS capsule, Take 1 capsule by mouth daily., Disp: , Rfl:   •  glyBURIDE (DIAbeta) 5 MG tablet, TAKE 1 TABLET TWICE A DAY WITH MEALS, Disp: 180 tablet, Rfl: 2  •  JANUMET XR  MG tablet, TAKE 1 TABLET TWICE A DAY, Disp: 180 tablet, Rfl: 2  •  levothyroxine (SYNTHROID) 125 MCG tablet, 1 by mouth daily for thyroid, Disp: 90 tablet, Rfl: 0  •  lisinopril (PRINIVIL,ZESTRIL) 10 MG tablet, TAKE 1 TABLET DAILY, Disp: 90 tablet, Rfl: 2  •  simvastatin (ZOCOR) 40 MG tablet, TAKE 1 TABLET EVERY NIGHT, Disp: 90 tablet, Rfl: 2      Family History   Problem Relation Age of Onset   • Diabetes Mother         Type 2         Social History     Socioeconomic History   • Marital status:      Spouse name: Not on file   • Number of children: 0   • Years of education: Not on file   • Highest education level: Bachelor's degree (e.g., BA, AB, BS)   Occupational History   • 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   Social Needs   • Financial resource strain: Not very hard   • Food insecurity:     Worry: Never true     Inability: Never true   • Transportation needs:     Medical: No     Non-medical: No   Tobacco Use   • Smoking status: Never Smoker   • Smokeless tobacco: Never Used   Substance and Sexual Activity   • Alcohol use: No   • Drug use: No   • Sexual activity: Yes     Partners: Female   Lifestyle   • Physical activity:  "    Days per week: 2 days     Minutes per session: 60 min   • Stress: Not at all   Relationships   • Social connections:     Talks on phone: More than three times a week     Gets together: Twice a week     Attends Buddhist service: Never     Active member of club or organization: Yes     Attends meetings of clubs or organizations: More than 4 times per year     Relationship status:          Vitals:    08/16/19 0702   BP: 108/62   Pulse: 78   SpO2: 99%   Weight: 92.4 kg (203 lb 12.8 oz)   Height: 180.3 cm (70.98\")          Physical Exam:    General: Alert and oriented x 3.  No acute distress.  Normal affect. Overweight. HEENT: Pupils equal, round, reactive to light; extraocular movements intact; sclerae nonicteric; pharynx, ear canals and TMs normal.  Neck: Without JVD, thyromegaly, bruit, or adenopathy.  Lungs: Clear to auscultation in all fields.  Heart: Regular rate and rhythm without murmur, rub, gallop, or click.  Abdomen: Soft, nontender, without hepatosplenomegaly or hernia.  Bowel sounds normal.  : Deferred.  Rectal: Deferred.  Extremities: Without clubbing, cyanosis, edema, or pulse deficit.  Neurologic: Intact without focal deficit.  Normal station and gait observed during ingress and egress from the examination room.  Skin: Without significant lesion.  Musculoskeletal: Unremarkable.    August 16, 2019--routine diabetic foot exam reveals no evidence of diabetic foot ulcer or pre-ulcerative callus.  Pulses are palpable.  Sensation intact.    Lab/other results:    NMR is perfect other than triglycerides slightly elevated at 187.  Total cholesterol is only 112.  CMP normal except blood sugar elevated at 214, creatinine slightly elevated at 1.33.  Albumin/creatinine ratio is normal.  Hemoglobin A1c 9.7.  Thyroid function test normal.  PSA normal at 0.172.  Vitamin D normal.  CPK normal.    Assessment/Plan:     Diagnosis Plan   1. Routine physical examination     2. Type 2 diabetes mellitus without " complication, without long-term current use of insulin (CMS/HCC)     3. Hyperlipidemia, unspecified hyperlipidemia type     4. Hypothyroidism, unspecified type     5. History of colon polyps, 12/18/2018--negative.  01/26/2015--tubular adenoma ×1.  Probable hyperplastic ×1.  Repeat due 3 years.     6. Benign essential hypertension     7. Multiple environmental allergies     8. Allergic rhinitis     9. Vitamin D deficiency     10. Diabetic foot exam     11. Chronic anemia     12. Elevated liver enzymes     13. Therapeutic drug monitoring       Patient presents with essentially normal annual except for the following issues: He has type 2 diabetes is not under very good control.  Medication adjustment is indicated.  I explained to patient that if he could lose weight and get down to ideal body weight that it may be that he could come off of a medication possibly but in the meantime we will going to have to add a medication and I think Victoza would be an excellent choice if his insurance will cover that is a reasonable cost.  Hyperlipidemia is under excellent control on the current regimen.  His thyroid is therapeutic.  Patient has a history of colon polyps and had a negative colonoscopy less than a year ago.  Blood pressure seems controlled.  Environmental allergies/allergic rhinitis currently not an issue.  Vitamin D is therapeutic.  Chronic anemia has resolved but we will continue to monitor.  Elevated liver enzymes have resolved and we will continue to monitor.    Several preventative health issues discussed including review of vaccinations and recommendations, including dietary issues, exercise and weight loss.  Safe sex practices discussed.  Patient advised to wear seatbelt whenever driving and avoid texting and driving.  Also advised to look both ways before crossing the street.  Colon cancer prevention discussed and is up-to-date with colonoscopy.  Advised to avoid tobacco products and minimize alcohol  consumption.    Plan is as follows: Start Victoza and titrate up to 1.8 mg subcutaneously daily.  Patient should stay on the glyburide and Janumet at the present time.  I will have him obtain fasting lab work after 3 months and then follow-up to reassess the situation.  In the meantime, patient has any problems he should contact me.  If he starts to have low blood sugars he should particularly contact me so I can give him further instructions.        Procedures

## 2019-08-19 DIAGNOSIS — E11.9 TYPE 2 DIABETES MELLITUS WITHOUT COMPLICATION, WITHOUT LONG-TERM CURRENT USE OF INSULIN (HCC): Chronic | ICD-10-CM

## 2019-10-08 RX ORDER — TADALAFIL 5 MG
TABLET ORAL
Qty: 30 TABLET | Refills: 0 | Status: SHIPPED | OUTPATIENT
Start: 2019-10-08 | End: 2021-01-14 | Stop reason: SDUPTHER

## 2019-11-06 ENCOUNTER — LAB (OUTPATIENT)
Dept: INTERNAL MEDICINE | Facility: CLINIC | Age: 64
End: 2019-11-06

## 2019-11-06 DIAGNOSIS — E03.9 HYPOTHYROIDISM, UNSPECIFIED TYPE: Chronic | ICD-10-CM

## 2019-11-06 DIAGNOSIS — E11.9 TYPE 2 DIABETES MELLITUS WITHOUT COMPLICATION, WITHOUT LONG-TERM CURRENT USE OF INSULIN (HCC): Primary | Chronic | ICD-10-CM

## 2019-11-06 DIAGNOSIS — Z00.00 ROUTINE PHYSICAL EXAMINATION: ICD-10-CM

## 2019-11-06 DIAGNOSIS — I10 BENIGN ESSENTIAL HYPERTENSION: Chronic | ICD-10-CM

## 2019-11-06 RX ORDER — LEVOTHYROXINE SODIUM 0.12 MG/1
TABLET ORAL
Qty: 90 TABLET | Refills: 0 | Status: SHIPPED | OUTPATIENT
Start: 2019-11-06 | End: 2020-01-24

## 2019-11-06 RX ORDER — SIMVASTATIN 40 MG
40 TABLET ORAL NIGHTLY
Qty: 90 TABLET | Refills: 2 | Status: SHIPPED | OUTPATIENT
Start: 2019-11-06 | End: 2020-07-02

## 2019-11-06 RX ORDER — LISINOPRIL 10 MG/1
10 TABLET ORAL DAILY
Qty: 90 TABLET | Refills: 2 | Status: SHIPPED | OUTPATIENT
Start: 2019-11-06 | End: 2020-07-02

## 2019-11-06 RX ORDER — GLYBURIDE 5 MG/1
5 TABLET ORAL 2 TIMES DAILY WITH MEALS
Qty: 180 TABLET | Refills: 2 | Status: SHIPPED | OUTPATIENT
Start: 2019-11-06 | End: 2020-07-22

## 2019-11-08 LAB
ALBUMIN SERPL-MCNC: 4.3 G/DL (ref 3.5–5.2)
ALBUMIN/GLOB SERPL: 1.7 G/DL
ALP SERPL-CCNC: 77 U/L (ref 39–117)
ALT SERPL-CCNC: 30 U/L (ref 1–41)
AST SERPL-CCNC: 20 U/L (ref 1–40)
BILIRUB SERPL-MCNC: 0.6 MG/DL (ref 0.2–1.2)
BUN SERPL-MCNC: 11 MG/DL (ref 8–23)
BUN/CREAT SERPL: 9.3 (ref 7–25)
CALCIUM SERPL-MCNC: 8.6 MG/DL (ref 8.6–10.5)
CHLORIDE SERPL-SCNC: 106 MMOL/L (ref 98–107)
CO2 SERPL-SCNC: 25.4 MMOL/L (ref 22–29)
CREAT SERPL-MCNC: 1.18 MG/DL (ref 0.76–1.27)
GLOBULIN SER CALC-MCNC: 2.5 GM/DL
GLUCOSE SERPL-MCNC: 173 MG/DL (ref 65–99)
HBA1C MFR BLD: 6.5 % (ref 4.8–5.6)
POTASSIUM SERPL-SCNC: 4.4 MMOL/L (ref 3.5–5.2)
PROT SERPL-MCNC: 6.8 G/DL (ref 6–8.5)
SODIUM SERPL-SCNC: 143 MMOL/L (ref 136–145)

## 2019-11-13 ENCOUNTER — OFFICE VISIT (OUTPATIENT)
Dept: INTERNAL MEDICINE | Facility: CLINIC | Age: 64
End: 2019-11-13

## 2019-11-13 VITALS
HEART RATE: 70 BPM | SYSTOLIC BLOOD PRESSURE: 108 MMHG | DIASTOLIC BLOOD PRESSURE: 64 MMHG | WEIGHT: 195 LBS | OXYGEN SATURATION: 99 % | BODY MASS INDEX: 27.3 KG/M2 | HEIGHT: 71 IN

## 2019-11-13 DIAGNOSIS — E03.9 HYPOTHYROIDISM, UNSPECIFIED TYPE: Chronic | ICD-10-CM

## 2019-11-13 DIAGNOSIS — E55.9 VITAMIN D DEFICIENCY: Chronic | ICD-10-CM

## 2019-11-13 DIAGNOSIS — E78.5 HYPERLIPIDEMIA, UNSPECIFIED HYPERLIPIDEMIA TYPE: Chronic | ICD-10-CM

## 2019-11-13 DIAGNOSIS — D64.9 CHRONIC ANEMIA: Chronic | ICD-10-CM

## 2019-11-13 DIAGNOSIS — E11.9 TYPE 2 DIABETES MELLITUS WITHOUT COMPLICATION, WITHOUT LONG-TERM CURRENT USE OF INSULIN (HCC): Primary | Chronic | ICD-10-CM

## 2019-11-13 DIAGNOSIS — Z86.010 HISTORY OF COLON POLYPS: Chronic | ICD-10-CM

## 2019-11-13 DIAGNOSIS — Z51.81 THERAPEUTIC DRUG MONITORING: ICD-10-CM

## 2019-11-13 DIAGNOSIS — I10 BENIGN ESSENTIAL HYPERTENSION: Chronic | ICD-10-CM

## 2019-11-13 DIAGNOSIS — R74.8 ELEVATED LIVER ENZYMES: Chronic | ICD-10-CM

## 2019-11-13 PROCEDURE — 99214 OFFICE O/P EST MOD 30 MIN: CPT | Performed by: INTERNAL MEDICINE

## 2019-11-13 NOTE — PROGRESS NOTES
11/13/2019    Patient Information  Lionel Carson                                                                                          [address] [place] [id number]      1955  [unfilled]  There is no work phone number on file.    Chief Complaint:     Follow-up type 2 diabetes which has been under poor control, recent medication adjustment, hyperlipidemia, hypothyroidism, hypertension, history of colon polyps, vitamin D deficiency, chronic anemia, elevated liver enzymes.  No new acute complaints.    History of Present Illness:  Patient with history of multiple medical problems as outlined in the chief complaint presents today for follow-up with lab prior in order to monitor his chronic medical issues.He was seen about 3 months ago for his annual physical and at that time his diabetes not under good control.  We started Victoza and titrated up to 1.8 mg subcutaneously daily.  Patient seemed to tolerate this well.  Currently has no new acute complaints.  His past medical history reviewed and updated were necessary including health maintenance parameters.  This reveals he is up-to-date or else accounted for with exception of the Shingrix vaccine and I have asked patient to check with his insurance company to see if it is a covered benefit here in the office.  If so, I highly recommended.    Review of Systems   Constitution: Negative.   HENT: Negative.    Eyes: Negative.    Cardiovascular: Negative.    Respiratory: Negative.    Endocrine: Negative.    Hematologic/Lymphatic: Negative.    Skin: Negative.    Musculoskeletal: Negative.    Gastrointestinal: Negative.    Genitourinary: Negative.    Neurological: Negative.    Psychiatric/Behavioral: Negative.    Allergic/Immunologic: Negative.        Active Problems:    Patient Active Problem List   Diagnosis   • Allergic rhinitis   • Benign essential hypertension   • History of colon polyps, 12/18/2018--negative.  01/26/2015--tubular  adenoma ×1.  Probable hyperplastic ×1.  Repeat due 3 years.   • Diverticulosis of colon   • Hyperlipidemia   • Hypothyroidism   • Multiple environmental allergies   • Type 2 diabetes mellitus (CMS/[facility])   • Vitamin D deficiency   • Therapeutic drug monitoring   • Routine physical examination   • Diabetic foot exam   • Diabetic eye exam (CMS/[facility])   • Chronic anemia   • Elevated liver enzymes         Past Medical History:   Diagnosis Date   • Benign essential hypertension 9/7/2007 09/07/2007--initial treatment with lisinopril that was primarily for renal protection.   • Chronic anemia 8/3/2017    08/03/2017--routine physical.  Hemoglobin is low at 13.3.  Hematocrit low at 40.0.  Indices are normal.  Repeat CBC, iron studies, homocystine, and methylmalonic acid ordered.  Patient will follow-up on the phone for the results and possible further instructions.  He is aware that it takes 2 weeks for one lab test to return.   • Diabetic eye exam (CMS/[facility]) 3/17/2017    03/17/2017--routine diabetic eye exam reveals no evidence of diabetic retinopathy.  Cataracts both eyes.  Observation recommended.  07/09/2014--routine ophthalmologic examination reveals no evidence of diabetic retinopathy.     02/02/2009--eye examination revealed no evidence of diabetic retinopathy, ocular hypertension noted OD.   • Diabetic foot exam 8/3/2017    08/03/2017--routine diabetic foot exam reveals no evidence of diabetic foot ulcer or pre-ulcerative callus.  Distal pulses are very palpable and there is no signs of ischemia.  Sensation subjectively intact.   • Diverticulosis of colon 1/26/2015 01/26/2015--colonoscopy revealed extensive internal and external hemorrhoids. The patient had 2 sessile polyps, one at the splenic flexure and the other in the ascending colon near the hepatic flexure which were removed and sent for pathology. Left colon diverticulosis also noted. The ascending colon polyp returned tubular adenoma. The splenic flexure  polyp was markedly cauterized and partially denuded. Focal hyperplastic features. No definite evidence of adenomatous change. Repeat study due 3 years.   • Elevated liver enzymes 8/3/2017    08/03/2017--routine physical.  AST elevated at 48.  ALT elevated at 61.  Suspect HEADLEY.  Hepatitis C antibody screening is negative.  We will repeat CMP in a few weeks and if the liver enzymes remain elevated then we will proceed with a liver ultrasound.   • History of anemia 02/04/2015 12/06/2015--hemoglobin normal at 13.2, hematocrit normal at 39.5. Resolve this issue.   02/04/2015--homocystine and methylmalonic acid are normal. Serum iron low normal at 62. Iron saturation low at 16%. Colonoscopy ordered and returned one hyperplastic polyp and one tubular adenomatous polyp.   01/25/2015--routine CBC reveals a hemoglobin low at 13.1, hematocrit low at 40.3. Homocystine, methylma   • History of colon polyps, 12/18/2018--negative.  01/26/2015--tubular adenoma ×1.  Probable hyperplastic ×1.  Repeat due 3 years. 1/26/2015 12/18/2018--colonoscopy revealed multiple small and large mouth diverticula in the sigmoid and descending colon.  There was evidence of an impacted diverticulum.  No bleeding.  Nonbleeding internal hemorrhoids noted.  Grade 3.  No polyps noted.  No specimens taken.  01/26/2015--colonoscopy revealed extensive internal and external hemorrhoids. The patient had 2 sessile polyps, one at the splenic fl   • Hyperlipidemia 9/7/2007 09/07/2007--initial treatment hyperlipidemia.   • Hypothyroidism 6/18/2008 06/18/2008--initial diagnosis and treatment hypothyroidism.   • Multiple environmental allergies 5/17/2016    Patient had allergy testing as a child.   • Type 2 diabetes mellitus (CMS/HCC) 7/17/2006 July 2006--initial diagnosis diabetes.   • Vitamin D deficiency 6/16/2016         Past Surgical History:   Procedure Laterality Date   • COLONOSCOPY  01/26/2015 01/26/2015--colonoscopy revealed extensive  internal and external hemorrhoids. The patient had 2 sessile polyps, one at the splenic flexure and the other in the ascending colon near the hepatic flexure which were removed and sent for pathology. Left colon diverticulosis also noted. The ascending colon polyp returned tubular adenoma. The splenic flexure polyp was markedly cauterized and partially de   • COLONOSCOPY N/A 12/18/2018 12/18/2018--colonoscopy revealed multiple small and large mouth diverticula in the sigmoid and descending colon.  There was evidence of an impacted diverticulum.  No bleeding.  Nonbleeding internal hemorrhoids noted.  Grade 3.  No polyps noted.  No specimens taken.         No Known Allergies        Current Outpatient Medications:   •  aspirin 81 MG EC tablet, Take 1 tablet by mouth daily., Disp: , Rfl:   •  Cholecalciferol (VITAMIN D) 2000 UNITS capsule, Take 1 capsule by mouth daily., Disp: , Rfl:   •  CIALIS 5 MG tablet, TAKE ONE TABLET BY MOUTH EVERY DAY, Disp: 30 tablet, Rfl: 0  •  glyburide (DIAbeta) 5 MG tablet, Take 1 tablet by mouth 2 (Two) Times a Day With Meals., Disp: 180 tablet, Rfl: 2  •  levothyroxine (SYNTHROID) 125 MCG tablet, 1 by mouth daily for thyroid, Disp: 90 tablet, Rfl: 0  •  Liraglutide (VICTOZA) 18 MG/3ML solution pen-injector injection, Inject 1.8 mg subcutaneously daily as directed, Disp: 9 pen, Rfl: 3  •  lisinopril (PRINIVIL,ZESTRIL) 10 MG tablet, Take 1 tablet by mouth Daily., Disp: 90 tablet, Rfl: 2  •  simvastatin (ZOCOR) 40 MG tablet, Take 1 tablet by mouth Every Night., Disp: 90 tablet, Rfl: 2  •  SITagliptin-metFORMIN HCl ER (JANUMET XR)  MG tablet, Take 1 tablet by mouth 2 (Two) Times a Day., Disp: 180 tablet, Rfl: 2      Family History   Problem Relation Age of Onset   • Diabetes Mother         Type 2         Social History     Socioeconomic History   • Marital status:      Spouse name: Not on file   • Number of children: 0   • Years of education: Not on file   • Highest  "education level: Bachelor's degree (e.g., BA, AB, BS)   Occupational History   • 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   Social Needs   • Financial resource strain: Not very hard   • Food insecurity:     Worry: Never true     Inability: Never true   • Transportation needs:     Medical: No     Non-medical: No   Tobacco Use   • Smoking status: Never Smoker   • Smokeless tobacco: Never Used   Substance and Sexual Activity   • Alcohol use: No     Frequency: Never     Binge frequency: Never   • Drug use: No   • Sexual activity: Yes     Partners: Female   Lifestyle   • Physical activity:     Days per week: 2 days     Minutes per session: 60 min   • Stress: Not at all   Relationships   • Social connections:     Talks on phone: More than three times a week     Gets together: Twice a week     Attends Bahai service: Never     Active member of club or organization: Yes     Attends meetings of clubs or organizations: More than 4 times per year     Relationship status:          Vitals:    11/13/19 0715   BP: 108/64   BP Location: Right arm   Patient Position: Sitting   Cuff Size: Adult   Pulse: 70   SpO2: 99%   Weight: 88.5 kg (195 lb)   Height: 180.3 cm (70.98\")        Body mass index is 27.21 kg/m².      Physical Exam:    General: Alert and oriented x 3.  No acute distress.  Normal affect.  HEENT: Pupils equal, round, reactive to light; extraocular movements intact; sclerae nonicteric; pharynx, ear canals and TMs normal.  Neck: Without JVD, thyromegaly, bruit, or adenopathy.  Lungs: Clear to auscultation in all fields.  Heart: Regular rate and rhythm without murmur, rub, gallop, or click.  Abdomen: Soft, nontender, without hepatosplenomegaly or hernia.  Bowel sounds normal.  : Deferred.  Rectal: Deferred.  Extremities: Without clubbing, cyanosis, edema, or pulse deficit.  Neurologic: Intact without focal deficit.  Normal station and gait observed during ingress and egress from the examination room.  " Skin: Without significant lesion.  Musculoskeletal: Unremarkable.    Lab/other results:    CMP normal except glucose elevated 173.  His liver enzymes are normal.  Hemoglobin A1c 6.5.    Assessment/Plan:     Diagnosis Plan   1. Type 2 diabetes mellitus without complication, without long-term current use of insulin (CMS/[facility])  Comprehensive Metabolic Panel    Hemoglobin A1c   2. Hyperlipidemia, unspecified hyperlipidemia type  CK    Comprehensive Metabolic Panel    NMR LipoProfile   3. Hypothyroidism, unspecified type  TSH    T4, Free    T3, Free   4. Benign essential hypertension     5. History of colon polyps, 12/18/2018--negative.  01/26/2015--tubular adenoma ×1.  Probable hyperplastic ×1.  Repeat due 3 years.     6. Vitamin D deficiency  Vitamin D 25 Hydroxy   7. Chronic anemia  CBC (No Diff)   8. Elevated liver enzymes     9. Therapeutic drug monitoring       Patient's type 2 diabetes is now under excellent control with the addition of Victoza and patient tolerated it well.  His blood pressure is well controlled and actually is little on the low side but he has no symptoms.  Hyperlipidemia was under good control previously and I expected to be even better with better glucose control.  Patient has hypothyroidism and his previous thyroid function tests were normal.  Chronic anemia will be monitored.  Elevated liver enzymes have resolved and we will continue to monitor a while longer.    Plan is as follows: No change in current medical regimen.  Patient reported that he has had both Shingrix vaccines and I have upgraded and documented this in the computer.  I will have patient follow-up in about 5 months with lab prior in order to assess the situation.      Procedures

## 2020-01-24 DIAGNOSIS — E03.9 HYPOTHYROIDISM, UNSPECIFIED TYPE: Chronic | ICD-10-CM

## 2020-01-24 RX ORDER — LEVOTHYROXINE SODIUM 0.12 MG/1
TABLET ORAL
Qty: 90 TABLET | Refills: 0 | Status: SHIPPED | OUTPATIENT
Start: 2020-01-24 | End: 2020-04-15 | Stop reason: SDUPTHER

## 2020-02-27 DIAGNOSIS — E11.9 TYPE 2 DIABETES MELLITUS WITHOUT COMPLICATION, WITHOUT LONG-TERM CURRENT USE OF INSULIN (HCC): Primary | Chronic | ICD-10-CM

## 2020-04-15 DIAGNOSIS — E03.9 HYPOTHYROIDISM, UNSPECIFIED TYPE: Chronic | ICD-10-CM

## 2020-04-15 RX ORDER — LEVOTHYROXINE SODIUM 0.12 MG/1
TABLET ORAL
Qty: 90 TABLET | Refills: 0 | Status: SHIPPED | OUTPATIENT
Start: 2020-04-15 | End: 2020-07-02

## 2020-05-14 DIAGNOSIS — D64.9 CHRONIC ANEMIA: Chronic | ICD-10-CM

## 2020-05-14 DIAGNOSIS — E55.9 VITAMIN D DEFICIENCY: Chronic | ICD-10-CM

## 2020-05-14 DIAGNOSIS — E03.9 HYPOTHYROIDISM, UNSPECIFIED TYPE: Chronic | ICD-10-CM

## 2020-05-14 DIAGNOSIS — E11.9 TYPE 2 DIABETES MELLITUS WITHOUT COMPLICATION, WITHOUT LONG-TERM CURRENT USE OF INSULIN (HCC): Chronic | ICD-10-CM

## 2020-05-14 DIAGNOSIS — E78.5 HYPERLIPIDEMIA, UNSPECIFIED HYPERLIPIDEMIA TYPE: Chronic | ICD-10-CM

## 2020-05-20 ENCOUNTER — APPOINTMENT (OUTPATIENT)
Dept: LAB | Facility: HOSPITAL | Age: 65
End: 2020-05-20

## 2020-05-20 LAB
25(OH)D3 SERPL-MCNC: 64.9 NG/ML (ref 30–100)
ALBUMIN SERPL-MCNC: 4.1 G/DL (ref 3.5–5.2)
ALBUMIN/GLOB SERPL: 1.4 G/DL
ALP SERPL-CCNC: 70 U/L (ref 39–117)
ALT SERPL W P-5'-P-CCNC: 49 U/L (ref 1–41)
ANION GAP SERPL CALCULATED.3IONS-SCNC: 9.3 MMOL/L (ref 5–15)
AST SERPL-CCNC: 30 U/L (ref 1–40)
BILIRUB SERPL-MCNC: 0.8 MG/DL (ref 0.2–1.2)
BUN BLD-MCNC: 11 MG/DL (ref 8–23)
BUN/CREAT SERPL: 8.7 (ref 7–25)
CALCIUM SPEC-SCNC: 9.2 MG/DL (ref 8.6–10.5)
CHLORIDE SERPL-SCNC: 105 MMOL/L (ref 98–107)
CK SERPL-CCNC: 105 U/L (ref 20–200)
CO2 SERPL-SCNC: 24.7 MMOL/L (ref 22–29)
CREAT BLD-MCNC: 1.26 MG/DL (ref 0.76–1.27)
DEPRECATED RDW RBC AUTO: 39.1 FL (ref 37–54)
ERYTHROCYTE [DISTWIDTH] IN BLOOD BY AUTOMATED COUNT: 12.5 % (ref 12.3–15.4)
GFR SERPL CREATININE-BSD FRML MDRD: 58 ML/MIN/1.73
GLOBULIN UR ELPH-MCNC: 3 GM/DL
GLUCOSE BLD-MCNC: 161 MG/DL (ref 65–99)
HBA1C MFR BLD: 7.46 % (ref 4.8–5.6)
HCT VFR BLD AUTO: 38.4 % (ref 37.5–51)
HGB BLD-MCNC: 12.8 G/DL (ref 13–17.7)
MCH RBC QN AUTO: 29 PG (ref 26.6–33)
MCHC RBC AUTO-ENTMCNC: 33.3 G/DL (ref 31.5–35.7)
MCV RBC AUTO: 87.1 FL (ref 79–97)
PLATELET # BLD AUTO: 147 10*3/MM3 (ref 140–450)
PMV BLD AUTO: 11.3 FL (ref 6–12)
POTASSIUM BLD-SCNC: 3.8 MMOL/L (ref 3.5–5.2)
PROT SERPL-MCNC: 7.1 G/DL (ref 6–8.5)
RBC # BLD AUTO: 4.41 10*6/MM3 (ref 4.14–5.8)
SODIUM BLD-SCNC: 139 MMOL/L (ref 136–145)
T3FREE SERPL-MCNC: 3.16 PG/ML (ref 2–4.4)
T4 FREE SERPL-MCNC: 1.76 NG/DL (ref 0.93–1.7)
TSH SERPL DL<=0.05 MIU/L-ACNC: 0.08 UIU/ML (ref 0.27–4.2)
WBC NRBC COR # BLD: 5.98 10*3/MM3 (ref 3.4–10.8)

## 2020-05-20 PROCEDURE — 84481 FREE ASSAY (FT-3): CPT | Performed by: INTERNAL MEDICINE

## 2020-05-20 PROCEDURE — 83704 LIPOPROTEIN BLD QUAN PART: CPT | Performed by: INTERNAL MEDICINE

## 2020-05-20 PROCEDURE — 80053 COMPREHEN METABOLIC PANEL: CPT | Performed by: INTERNAL MEDICINE

## 2020-05-20 PROCEDURE — 36415 COLL VENOUS BLD VENIPUNCTURE: CPT

## 2020-05-20 PROCEDURE — 85027 COMPLETE CBC AUTOMATED: CPT | Performed by: INTERNAL MEDICINE

## 2020-05-20 PROCEDURE — 82550 ASSAY OF CK (CPK): CPT | Performed by: INTERNAL MEDICINE

## 2020-05-20 PROCEDURE — 84439 ASSAY OF FREE THYROXINE: CPT | Performed by: INTERNAL MEDICINE

## 2020-05-20 PROCEDURE — 84443 ASSAY THYROID STIM HORMONE: CPT | Performed by: INTERNAL MEDICINE

## 2020-05-20 PROCEDURE — 83036 HEMOGLOBIN GLYCOSYLATED A1C: CPT | Performed by: INTERNAL MEDICINE

## 2020-05-20 PROCEDURE — 80061 LIPID PANEL: CPT | Performed by: INTERNAL MEDICINE

## 2020-05-20 PROCEDURE — 82306 VITAMIN D 25 HYDROXY: CPT | Performed by: INTERNAL MEDICINE

## 2020-05-21 LAB
CHOLEST SERPL-MCNC: 117 MG/DL (ref 100–199)
HDL SERPL-SCNC: 34.3 UMOL/L
HDLC SERPL-MCNC: 41 MG/DL
LDL-P: 533 NMOL/L
LDLC REAL SIZE PAT SERPL: 20.2 NM
LDLC SERPL CALC-MCNC: 39 MG/DL (ref 0–99)
SMALL LDL-P: 303 NMOL/L
TRIGL SERPL-MCNC: 183 MG/DL (ref 0–149)

## 2020-05-27 ENCOUNTER — OFFICE VISIT (OUTPATIENT)
Dept: INTERNAL MEDICINE | Facility: CLINIC | Age: 65
End: 2020-05-27

## 2020-05-27 VITALS
OXYGEN SATURATION: 99 % | DIASTOLIC BLOOD PRESSURE: 68 MMHG | WEIGHT: 205.4 LBS | HEART RATE: 81 BPM | BODY MASS INDEX: 28.76 KG/M2 | HEIGHT: 71 IN | SYSTOLIC BLOOD PRESSURE: 118 MMHG

## 2020-05-27 DIAGNOSIS — Z00.00 ROUTINE PHYSICAL EXAMINATION: ICD-10-CM

## 2020-05-27 DIAGNOSIS — E78.2 MIXED HYPERLIPIDEMIA: Chronic | ICD-10-CM

## 2020-05-27 DIAGNOSIS — Z11.59 NEED FOR HEPATITIS C SCREENING TEST: ICD-10-CM

## 2020-05-27 DIAGNOSIS — I10 BENIGN ESSENTIAL HYPERTENSION: Chronic | ICD-10-CM

## 2020-05-27 DIAGNOSIS — E11.9 TYPE 2 DIABETES MELLITUS WITHOUT COMPLICATION, WITHOUT LONG-TERM CURRENT USE OF INSULIN (HCC): Primary | Chronic | ICD-10-CM

## 2020-05-27 DIAGNOSIS — E03.9 PRIMARY HYPOTHYROIDISM: Chronic | ICD-10-CM

## 2020-05-27 DIAGNOSIS — D64.9 CHRONIC ANEMIA: Chronic | ICD-10-CM

## 2020-05-27 DIAGNOSIS — Z86.010 HISTORY OF COLON POLYPS: Chronic | ICD-10-CM

## 2020-05-27 DIAGNOSIS — Z91.09 MULTIPLE ENVIRONMENTAL ALLERGIES: Chronic | ICD-10-CM

## 2020-05-27 DIAGNOSIS — R74.8 ELEVATED LIVER ENZYMES: Chronic | ICD-10-CM

## 2020-05-27 DIAGNOSIS — Z51.81 THERAPEUTIC DRUG MONITORING: ICD-10-CM

## 2020-05-27 DIAGNOSIS — E55.9 VITAMIN D DEFICIENCY: Chronic | ICD-10-CM

## 2020-05-27 PROCEDURE — 99214 OFFICE O/P EST MOD 30 MIN: CPT | Performed by: INTERNAL MEDICINE

## 2020-05-27 NOTE — PROGRESS NOTES
05/27/2020    Patient Information  Lionel Carson                                                                                          [address]  [address]  [facility] [address]      1955  [unfilled]  There is no work phone number on file.    Chief Complaint:     Follow-up lab work in order to monitor chronic medical issues listed in the history of present illness.  No new acute complaints.    History of Present Illness:    Patient with a history of type 2 diabetes, hyperlipidemia, elevated liver enzymes, primary hypothyroidism, hypertension, history of colon polyps, environmental allergies, vitamin D deficiency, chronic anemia.  He presents today for a routine follow-up with lab prior in order to monitor his chronic medical issues.  His past medical history reviewed and updated were necessary including health maintenance parameters.      Review of Systems   Constitution: Negative.   HENT: Negative.    Eyes: Negative.    Cardiovascular: Negative.    Respiratory: Negative.    Endocrine: Negative.    Hematologic/Lymphatic: Negative.    Skin: Negative.    Musculoskeletal: Negative.    Gastrointestinal: Negative.    Genitourinary: Negative.    Neurological: Negative.    Psychiatric/Behavioral: Negative.    Allergic/Immunologic: Negative.        Active Problems:    Patient Active Problem List   Diagnosis   • Allergic rhinitis   • Benign essential hypertension   • History of colon polyps, 12/18/2018--negative.  01/26/2015--tubular adenoma ×1.  Probable hyperplastic ×1.  Repeat due 3 years.   • Diverticulosis of colon   • Hyperlipidemia   • Primary hypothyroidism   • Multiple environmental allergies   • Type 2 diabetes mellitus without complication, without long-term current use of insulin (CMS/[facility])   • Vitamin D deficiency   • Therapeutic drug monitoring   • Routine physical examination   • Diabetic foot exam   • Diabetic eye exam (CMS/HCC)   • Chronic anemia   • Elevated liver enzymes          Past Medical History:   Diagnosis Date   • Benign essential hypertension 9/7/2007 09/07/2007--initial treatment with lisinopril that was primarily for renal protection.   • Chronic anemia 8/3/2017    08/03/2017--routine physical.  Hemoglobin is low at 13.3.  Hematocrit low at 40.0.  Indices are normal.  Repeat CBC, iron studies, homocystine, and methylmalonic acid ordered.  Patient will follow-up on the phone for the results and possible further instructions.  He is aware that it takes 2 weeks for one lab test to return.   • Diabetic eye exam (CMS/[facility]) 3/17/2017    03/17/2017--routine diabetic eye exam reveals no evidence of diabetic retinopathy.  Cataracts both eyes.  Observation recommended.  07/09/2014--routine ophthalmologic examination reveals no evidence of diabetic retinopathy.     02/02/2009--eye examination revealed no evidence of diabetic retinopathy, ocular hypertension noted OD.   • Diabetic foot exam 8/3/2017    08/03/2017--routine diabetic foot exam reveals no evidence of diabetic foot ulcer or pre-ulcerative callus.  Distal pulses are very palpable and there is no signs of ischemia.  Sensation subjectively intact.   • Diverticulosis of colon 1/26/2015 01/26/2015--colonoscopy revealed extensive internal and external hemorrhoids. The patient had 2 sessile polyps, one at the splenic flexure and the other in the ascending colon near the hepatic flexure which were removed and sent for pathology. Left colon diverticulosis also noted. The ascending colon polyp returned tubular adenoma. The splenic flexure polyp was markedly cauterized and partially denuded. Focal hyperplastic features. No definite evidence of adenomatous change. Repeat study due 3 years.   • Elevated liver enzymes 8/3/2017    08/03/2017--routine physical.  AST elevated at 48.  ALT elevated at 61.  Suspect HEADLEY.  Hepatitis C antibody screening is negative.  We will repeat CMP in a few weeks and if the liver enzymes remain  elevated then we will proceed with a liver ultrasound.   • History of anemia 02/04/2015 12/06/2015--hemoglobin normal at 13.2, hematocrit normal at 39.5. Resolve this issue.   02/04/2015--homocystine and methylmalonic acid are normal. Serum iron low normal at 62. Iron saturation low at 16%. Colonoscopy ordered and returned one hyperplastic polyp and one tubular adenomatous polyp.   01/25/2015--routine CBC reveals a hemoglobin low at 13.1, hematocrit low at 40.3. Homocystine, methylma   • History of colon polyps, 12/18/2018--negative.  01/26/2015--tubular adenoma ×1.  Probable hyperplastic ×1.  Repeat due 3 years. 1/26/2015 12/18/2018--colonoscopy revealed multiple small and large mouth diverticula in the sigmoid and descending colon.  There was evidence of an impacted diverticulum.  No bleeding.  Nonbleeding internal hemorrhoids noted.  Grade 3.  No polyps noted.  No specimens taken.  01/26/2015--colonoscopy revealed extensive internal and external hemorrhoids. The patient had 2 sessile polyps, one at the splenic fl   • Hyperlipidemia 9/7/2007 09/07/2007--initial treatment hyperlipidemia.   • Hypothyroidism 6/18/2008 06/18/2008--initial diagnosis and treatment hypothyroidism.   • Multiple environmental allergies 5/17/2016    Patient had allergy testing as a child.   • Type 2 diabetes mellitus without complication, without long-term current use of insulin (CMS/[facility]) 7/17/2006 July 2006--initial diagnosis diabetes.   • Vitamin D deficiency 6/16/2016         Past Surgical History:   Procedure Laterality Date   • COLONOSCOPY  01/26/2015 01/26/2015--colonoscopy revealed extensive internal and external hemorrhoids. The patient had 2 sessile polyps, one at the splenic flexure and the other in the ascending colon near the hepatic flexure which were removed and sent for pathology. Left colon diverticulosis also noted. The ascending colon polyp returned tubular adenoma. The splenic flexure polyp was markedly  cauterized and partially de   • COLONOSCOPY N/A 12/18/2018 12/18/2018--colonoscopy revealed multiple small and large mouth diverticula in the sigmoid and descending colon.  There was evidence of an impacted diverticulum.  No bleeding.  Nonbleeding internal hemorrhoids noted.  Grade 3.  No polyps noted.  No specimens taken.         No Known Allergies        Current Outpatient Medications:   •  aspirin 81 MG EC tablet, Take 1 tablet by mouth daily., Disp: , Rfl:   •  Cholecalciferol (VITAMIN D) 2000 UNITS capsule, Take 1 capsule by mouth daily., Disp: , Rfl:   •  CIALIS 5 MG tablet, TAKE ONE TABLET BY MOUTH EVERY DAY, Disp: 30 tablet, Rfl: 0  •  glyburide (DIAbeta) 5 MG tablet, Take 1 tablet by mouth 2 (Two) Times a Day With Meals., Disp: 180 tablet, Rfl: 2  •  Insulin Pen Needle 32G X 5 MM misc, Use 1 needle for daily injection of victoza, Disp: 100 each, Rfl: 3  •  levothyroxine (Synthroid) 125 MCG tablet, TAKE 1 TABLET DAILY FOR    THYROID, Disp: 90 tablet, Rfl: 0  •  Liraglutide (VICTOZA) 18 MG/3ML solution pen-injector injection, Inject 1.8 mg subcutaneously daily as directed, Disp: 9 pen, Rfl: 3  •  lisinopril (PRINIVIL,ZESTRIL) 10 MG tablet, Take 1 tablet by mouth Daily., Disp: 90 tablet, Rfl: 2  •  NOVOFINE PLUS 32G X 4 MM misc, , Disp: , Rfl:   •  simvastatin (ZOCOR) 40 MG tablet, Take 1 tablet by mouth Every Night., Disp: 90 tablet, Rfl: 2  •  SITagliptin-metFORMIN HCl ER (JANUMET XR)  MG tablet, Take 1 tablet by mouth 2 (Two) Times a Day., Disp: 180 tablet, Rfl: 2  •  Semaglutide,0.25 or 0.5MG/DOS, (Ozempic, 0.25 or 0.5 MG/DOSE,) 2 MG/1.5ML solution pen-injector, Inject 0.25 mg subcutaneously once weekly x4 weeks and then increase to 0.5 mg subcutaneous weekly thereafter., Disp: 3 pen, Rfl: 3      Family History   Problem Relation Age of Onset   • Diabetes Mother         Type 2         Social History     Socioeconomic History   • Marital status:      Spouse name: Not on file   • Number of  "children: 0   • Years of education: Not on file   • Highest education level: Bachelor's degree (e.g., BA, AB, BS)   Occupational History   • 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   Social Needs   • Financial resource strain: Not very hard   • Food insecurity:     Worry: Never true     Inability: Never true   • Transportation needs:     Medical: No     Non-medical: No   Tobacco Use   • Smoking status: Never Smoker   • Smokeless tobacco: Never Used   Substance and Sexual Activity   • Alcohol use: No     Frequency: Never     Binge frequency: Never   • Drug use: No   • Sexual activity: Yes     Partners: Female   Lifestyle   • Physical activity:     Days per week: 2 days     Minutes per session: 60 min   • Stress: Not at all   Relationships   • Social connections:     Talks on phone: More than three times a week     Gets together: Twice a week     Attends Hinduism service: Never     Active member of club or organization: Yes     Attends meetings of clubs or organizations: More than 4 times per year     Relationship status:          Vitals:    05/27/20 0720   BP: 118/68   BP Location: Left arm   Pulse: 81   SpO2: 99%   Weight: 93.2 kg (205 lb 6.4 oz)   Height: 180.3 cm (70.98\")        Body mass index is 28.66 kg/m².      Physical Exam:    General: Alert and oriented x 3.  No acute distress.  Normal affect.  HEENT: Pupils equal, round, reactive to light; extraocular movements intact; sclerae nonicteric; pharynx, ear canals and TMs normal.  Neck: Without JVD, thyromegaly, bruit, or adenopathy.  Lungs: Clear to auscultation in all fields.  Heart: Regular rate and rhythm without murmur, rub, gallop, or click.  Abdomen: Soft, nontender, without hepatosplenomegaly or hernia.  Bowel sounds normal.  : Deferred.  Rectal: Deferred.  Extremities: Without clubbing, cyanosis, edema, or pulse deficit.  Neurologic: Intact without focal deficit.  Normal station and gait observed during ingress and egress from the " examination room.  Skin: Without significant lesion.  Musculoskeletal: Unremarkable.    Lab/other results:    NMR reveals a total cholesterol of 117.  Triglycerides slightly elevated 183.  LDL particle number excellent at 533.  Small LDL particle number excellent 303.  HDL particle number normal at 34.3.  Hemoglobin A1c 7.46.  CMP normal except glucose 161.  CPK normal.  CBC normal except hemoglobin 12.8 but hematocrit is normal at 38.4.  TSH is low at 0.076.  Free T4 elevated 1.76.  Free T3 normal at 3.16.  Vitamin D normal at 64.9.    Assessment/Plan:     Diagnosis Plan   1. Type 2 diabetes mellitus without complication, without long-term current use of insulin (CMS/[facility])  Comprehensive Metabolic Panel    Hemoglobin A1c    Urinalysis With Microscopic If Indicated (No Culture) - Urine, Clean Catch    Microalbumin / Creatinine Urine Ratio - Urine, Clean Catch    Semaglutide,0.25 or 0.5MG/DOS, (Ozempic, 0.25 or 0.5 MG/DOSE,) 2 MG/1.5ML solution pen-injector   2. Hyperlipidemia  CK    Comprehensive Metabolic Panel    NMR LipoProfile   3. Elevated liver enzymes     4. Primary hypothyroidism  TSH    T4, Free    T3, Free   5. Benign essential hypertension     6. History of colon polyps, 12/18/2018--negative.  01/26/2015--tubular adenoma ×1.  Probable hyperplastic ×1.  Repeat due 3 years.     7. Multiple environmental allergies     8. Vitamin D deficiency  Vitamin D 25 Hydroxy   9. Chronic anemia  CBC (No Diff)   10. Therapeutic drug monitoring  PSA DIAGNOSTIC   11. Routine physical examination  CBC (No Diff)    CK    Comprehensive Metabolic Panel    Hemoglobin A1c    NMR LipoProfile    Vitamin D 25 Hydroxy    Urinalysis With Microscopic If Indicated (No Culture) - Urine, Clean Catch    TSH    T4, Free    T3, Free    PSA DIAGNOSTIC    Microalbumin / Creatinine Urine Ratio - Urine, Clean Catch   12. Need for hepatitis C screening test  Hepatitis C Antibody     Patient has type 2 diabetes that is under reasonable control  although it is not quite as good as it was previously.  Hyperlipidemia is under excellent control.  Elevated liver enzymes appear to have resolved but we will monitor.  It appears that he is getting a little too much thyroid supplementation but is not enough for me to make a change at the present time.  Blood pressure well controlled.  Patient has a history of colon polyps and is up-to-date on his colonoscopy.  Environmental allergies currently not a big issue.  Vitamin D is in normal range.  Chronic anemia has been mild and thoroughly evaluated.    Plan is as follows: Discontinue Victoza and start Ozempic 0.25 mg weekly x4 weeks and then increase to 0.5 mg and stay there.  This will be more convenient and likely better tolerated.  Hemoglobin A1c not at goal on Victoza.  Recommend vascular screening.  Encourage low carbohydrate diet, exercise, and weight loss.  I will have patient follow-up in 4 months for his annual physical.        Procedures

## 2020-05-29 DIAGNOSIS — E11.9 TYPE 2 DIABETES MELLITUS WITHOUT COMPLICATION, WITHOUT LONG-TERM CURRENT USE OF INSULIN (HCC): Chronic | ICD-10-CM

## 2020-07-02 DIAGNOSIS — E03.9 HYPOTHYROIDISM, UNSPECIFIED TYPE: Chronic | ICD-10-CM

## 2020-07-02 RX ORDER — LEVOTHYROXINE SODIUM 0.12 MG/1
TABLET ORAL
Qty: 90 TABLET | Refills: 0 | Status: SHIPPED | OUTPATIENT
Start: 2020-07-02 | End: 2020-10-22 | Stop reason: SDUPTHER

## 2020-07-02 RX ORDER — SIMVASTATIN 40 MG
TABLET ORAL
Qty: 90 TABLET | Refills: 2 | Status: SHIPPED | OUTPATIENT
Start: 2020-07-02 | End: 2021-04-09

## 2020-07-02 RX ORDER — LISINOPRIL 10 MG/1
TABLET ORAL
Qty: 90 TABLET | Refills: 2 | Status: SHIPPED | OUTPATIENT
Start: 2020-07-02 | End: 2021-04-09

## 2020-07-15 ENCOUNTER — TELEPHONE (OUTPATIENT)
Dept: INTERNAL MEDICINE | Facility: CLINIC | Age: 65
End: 2020-07-15

## 2020-07-15 NOTE — TELEPHONE ENCOUNTER
Pt called asking about his dose of Ozempic and I told him 0.25 x 4 weeks then increase to 0.50 and stay on that dose.  He ask about rf on his other med and if anyone had requested rf.  I said no.  He needs to contact his pharm.

## 2020-07-22 RX ORDER — GLYBURIDE 5 MG/1
TABLET ORAL
Qty: 180 TABLET | Refills: 2 | Status: SHIPPED | OUTPATIENT
Start: 2020-07-22 | End: 2021-04-09

## 2020-07-22 RX ORDER — SITAGLIPTIN AND METFORMIN HYDROCHLORIDE 1000; 50 MG/1; MG/1
TABLET, FILM COATED, EXTENDED RELEASE ORAL
Qty: 180 TABLET | Refills: 2 | Status: SHIPPED | OUTPATIENT
Start: 2020-07-22 | End: 2021-04-09

## 2020-09-22 DIAGNOSIS — Z51.81 THERAPEUTIC DRUG MONITORING: ICD-10-CM

## 2020-09-22 DIAGNOSIS — Z00.00 ROUTINE PHYSICAL EXAMINATION: ICD-10-CM

## 2020-09-22 DIAGNOSIS — E03.9 PRIMARY HYPOTHYROIDISM: Chronic | ICD-10-CM

## 2020-09-22 DIAGNOSIS — Z11.59 NEED FOR HEPATITIS C SCREENING TEST: ICD-10-CM

## 2020-09-22 DIAGNOSIS — E11.9 TYPE 2 DIABETES MELLITUS WITHOUT COMPLICATION, WITHOUT LONG-TERM CURRENT USE OF INSULIN (HCC): Chronic | ICD-10-CM

## 2020-09-22 DIAGNOSIS — E78.2 MIXED HYPERLIPIDEMIA: Chronic | ICD-10-CM

## 2020-09-22 DIAGNOSIS — D64.9 CHRONIC ANEMIA: Chronic | ICD-10-CM

## 2020-09-22 DIAGNOSIS — E55.9 VITAMIN D DEFICIENCY: Chronic | ICD-10-CM

## 2020-09-23 ENCOUNTER — LAB (OUTPATIENT)
Dept: LAB | Facility: HOSPITAL | Age: 65
End: 2020-09-23

## 2020-09-23 LAB
25(OH)D3 SERPL-MCNC: 69.6 NG/ML (ref 30–100)
ALBUMIN SERPL-MCNC: 4.3 G/DL (ref 3.5–5.2)
ALBUMIN/GLOB SERPL: 1.8 G/DL
ALP SERPL-CCNC: 72 U/L (ref 39–117)
ALT SERPL W P-5'-P-CCNC: 52 U/L (ref 1–41)
ANION GAP SERPL CALCULATED.3IONS-SCNC: 10.8 MMOL/L (ref 5–15)
AST SERPL-CCNC: 34 U/L (ref 1–40)
BILIRUB SERPL-MCNC: 0.8 MG/DL (ref 0–1.2)
BUN SERPL-MCNC: 9 MG/DL (ref 8–23)
BUN/CREAT SERPL: 7 (ref 7–25)
CALCIUM SPEC-SCNC: 8.6 MG/DL (ref 8.6–10.5)
CHLORIDE SERPL-SCNC: 104 MMOL/L (ref 98–107)
CK SERPL-CCNC: 105 U/L (ref 20–200)
CO2 SERPL-SCNC: 24.2 MMOL/L (ref 22–29)
CREAT SERPL-MCNC: 1.29 MG/DL (ref 0.76–1.27)
DEPRECATED RDW RBC AUTO: 40.5 FL (ref 37–54)
ERYTHROCYTE [DISTWIDTH] IN BLOOD BY AUTOMATED COUNT: 12.5 % (ref 12.3–15.4)
GFR SERPL CREATININE-BSD FRML MDRD: 56 ML/MIN/1.73
GLOBULIN UR ELPH-MCNC: 2.4 GM/DL
GLUCOSE SERPL-MCNC: 159 MG/DL (ref 65–99)
HBA1C MFR BLD: 8.3 % (ref 4.8–5.6)
HCT VFR BLD AUTO: 38.9 % (ref 37.5–51)
HCV AB SER DONR QL: NORMAL
HGB BLD-MCNC: 12.8 G/DL (ref 13–17.7)
MCH RBC QN AUTO: 29.3 PG (ref 26.6–33)
MCHC RBC AUTO-ENTMCNC: 32.9 G/DL (ref 31.5–35.7)
MCV RBC AUTO: 89 FL (ref 79–97)
PLATELET # BLD AUTO: 155 10*3/MM3 (ref 140–450)
PMV BLD AUTO: 11.1 FL (ref 6–12)
POTASSIUM SERPL-SCNC: 4.4 MMOL/L (ref 3.5–5.2)
PROT SERPL-MCNC: 6.7 G/DL (ref 6–8.5)
PSA SERPL-MCNC: 0.26 NG/ML (ref 0–4)
RBC # BLD AUTO: 4.37 10*6/MM3 (ref 4.14–5.8)
SODIUM SERPL-SCNC: 139 MMOL/L (ref 136–145)
T3FREE SERPL-MCNC: 2.73 PG/ML (ref 2–4.4)
T4 FREE SERPL-MCNC: 1.63 NG/DL (ref 0.93–1.7)
TSH SERPL DL<=0.05 MIU/L-ACNC: 0.6 UIU/ML (ref 0.27–4.2)
WBC # BLD AUTO: 5.35 10*3/MM3 (ref 3.4–10.8)

## 2020-09-23 PROCEDURE — 83704 LIPOPROTEIN BLD QUAN PART: CPT | Performed by: INTERNAL MEDICINE

## 2020-09-23 PROCEDURE — 86803 HEPATITIS C AB TEST: CPT | Performed by: INTERNAL MEDICINE

## 2020-09-23 PROCEDURE — 84481 FREE ASSAY (FT-3): CPT | Performed by: INTERNAL MEDICINE

## 2020-09-23 PROCEDURE — 36415 COLL VENOUS BLD VENIPUNCTURE: CPT

## 2020-09-23 PROCEDURE — 80053 COMPREHEN METABOLIC PANEL: CPT | Performed by: INTERNAL MEDICINE

## 2020-09-23 PROCEDURE — 82306 VITAMIN D 25 HYDROXY: CPT | Performed by: INTERNAL MEDICINE

## 2020-09-23 PROCEDURE — 80061 LIPID PANEL: CPT | Performed by: INTERNAL MEDICINE

## 2020-09-23 PROCEDURE — 84443 ASSAY THYROID STIM HORMONE: CPT | Performed by: INTERNAL MEDICINE

## 2020-09-23 PROCEDURE — 83036 HEMOGLOBIN GLYCOSYLATED A1C: CPT | Performed by: INTERNAL MEDICINE

## 2020-09-23 PROCEDURE — 85027 COMPLETE CBC AUTOMATED: CPT | Performed by: INTERNAL MEDICINE

## 2020-09-23 PROCEDURE — 84153 ASSAY OF PSA TOTAL: CPT | Performed by: INTERNAL MEDICINE

## 2020-09-23 PROCEDURE — 84439 ASSAY OF FREE THYROXINE: CPT | Performed by: INTERNAL MEDICINE

## 2020-09-23 PROCEDURE — 82550 ASSAY OF CK (CPK): CPT | Performed by: INTERNAL MEDICINE

## 2020-09-24 LAB
CHOLEST SERPL-MCNC: 127 MG/DL (ref 100–199)
HDL SERPL-SCNC: 31.6 UMOL/L
HDLC SERPL-MCNC: 41 MG/DL
LDL SERPL QN: 20.1 NM
LDL SERPL-SCNC: 694 NMOL/L
LDL SMALL SERPL-SCNC: 449 NMOL/L
LDLC SERPL CALC-MCNC: 51 MG/DL (ref 0–99)
TRIGL SERPL-MCNC: 215 MG/DL (ref 0–149)

## 2020-09-30 ENCOUNTER — OFFICE VISIT (OUTPATIENT)
Dept: INTERNAL MEDICINE | Facility: CLINIC | Age: 65
End: 2020-09-30

## 2020-09-30 VITALS
WEIGHT: 201 LBS | HEIGHT: 71 IN | OXYGEN SATURATION: 98 % | HEART RATE: 79 BPM | SYSTOLIC BLOOD PRESSURE: 92 MMHG | DIASTOLIC BLOOD PRESSURE: 64 MMHG | BODY MASS INDEX: 28.14 KG/M2

## 2020-09-30 DIAGNOSIS — E03.9 PRIMARY HYPOTHYROIDISM: Chronic | ICD-10-CM

## 2020-09-30 DIAGNOSIS — E11.9 ENCOUNTER FOR DIABETIC FOOT EXAM (HCC): Chronic | ICD-10-CM

## 2020-09-30 DIAGNOSIS — Z23 NEED FOR INFLUENZA VACCINATION: ICD-10-CM

## 2020-09-30 DIAGNOSIS — Z00.00 ROUTINE PHYSICAL EXAMINATION: Primary | ICD-10-CM

## 2020-09-30 DIAGNOSIS — D64.9 CHRONIC ANEMIA: Chronic | ICD-10-CM

## 2020-09-30 DIAGNOSIS — Z51.81 THERAPEUTIC DRUG MONITORING: ICD-10-CM

## 2020-09-30 DIAGNOSIS — E78.2 MIXED HYPERLIPIDEMIA: Chronic | ICD-10-CM

## 2020-09-30 DIAGNOSIS — Z91.09 MULTIPLE ENVIRONMENTAL ALLERGIES: Chronic | ICD-10-CM

## 2020-09-30 DIAGNOSIS — E55.9 VITAMIN D DEFICIENCY: Chronic | ICD-10-CM

## 2020-09-30 DIAGNOSIS — I10 BENIGN ESSENTIAL HYPERTENSION: Chronic | ICD-10-CM

## 2020-09-30 DIAGNOSIS — E11.9 TYPE 2 DIABETES MELLITUS WITHOUT COMPLICATION, WITHOUT LONG-TERM CURRENT USE OF INSULIN (HCC): Chronic | ICD-10-CM

## 2020-09-30 DIAGNOSIS — R74.8 ELEVATED LIVER ENZYMES: Chronic | ICD-10-CM

## 2020-09-30 DIAGNOSIS — J30.1 CHRONIC SEASONAL ALLERGIC RHINITIS DUE TO POLLEN: Chronic | ICD-10-CM

## 2020-09-30 DIAGNOSIS — Z86.010 HISTORY OF COLON POLYPS: Chronic | ICD-10-CM

## 2020-09-30 PROCEDURE — 90686 IIV4 VACC NO PRSV 0.5 ML IM: CPT | Performed by: INTERNAL MEDICINE

## 2020-09-30 PROCEDURE — 90471 IMMUNIZATION ADMIN: CPT | Performed by: INTERNAL MEDICINE

## 2020-09-30 PROCEDURE — 99396 PREV VISIT EST AGE 40-64: CPT | Performed by: INTERNAL MEDICINE

## 2020-09-30 NOTE — PROGRESS NOTES
09/30/2020    Patient Information  Lionel Carson                                                                                          [address]  [address]  [facility] [address]      1955  [unfilled]  There is no work phone number on file.    Chief Complaint:     Routine annual physical examination and follow-up blood work in order to monitor chronic medical issues listed in history of present illness.  No new acute complaints.    History of Present Illness:    Patient with a history of type 2 diabetes, hyperlipidemia, hypothyroidism, hypertension, history of colon polyps, environmental allergies/allergic rhinitis, vitamin D deficiency, chronic anemia, elevated liver enzymes.  He presents today for his routine annual physical exam and follow-up blood work.  His past medical history reviewed and updated were necessary including health maintenance parameters.  This reveals he will be up-to-date or else accounted for after today's visit.    Review of Systems   Constitution: Negative.   HENT: Negative.    Eyes: Negative.    Cardiovascular: Negative.    Respiratory: Negative.    Endocrine: Negative.    Hematologic/Lymphatic: Negative.    Skin: Negative.    Musculoskeletal: Negative.    Gastrointestinal: Negative.    Genitourinary: Negative.    Neurological: Negative.    Psychiatric/Behavioral: Negative.    Allergic/Immunologic: Negative.        Active Problems:    Patient Active Problem List   Diagnosis   • Allergic rhinitis   • Benign essential hypertension   • History of colon polyps, 12/18/2018--negative.  01/26/2015--tubular adenoma ×1.  Probable hyperplastic ×1.  Repeat due 3 years.   • Diverticulosis of colon   • Hyperlipidemia   • Primary hypothyroidism   • Multiple environmental allergies   • Type 2 diabetes mellitus without complication, without long-term current use of insulin (CMS/[facility])   • Vitamin D deficiency   • Therapeutic drug monitoring   • Routine physical examination   •  Diabetic foot exam   • Diabetic eye exam (CMS/[facility])   • Chronic anemia   • Elevated liver enzymes         Past Medical History:   Diagnosis Date   • Benign essential hypertension 9/7/2007 09/07/2007--initial treatment with lisinopril that was primarily for renal protection.   • Chronic anemia 8/3/2017    08/03/2017--routine physical.  Hemoglobin is low at 13.3.  Hematocrit low at 40.0.  Indices are normal.  Repeat CBC, iron studies, homocystine, and methylmalonic acid ordered.  Patient will follow-up on the phone for the results and possible further instructions.  He is aware that it takes 2 weeks for one lab test to return.   • Diabetic eye exam (CMS/[facility]) 3/17/2017    03/17/2017--routine diabetic eye exam reveals no evidence of diabetic retinopathy.  Cataracts both eyes.  Observation recommended.  07/09/2014--routine ophthalmologic examination reveals no evidence of diabetic retinopathy.     02/02/2009--eye examination revealed no evidence of diabetic retinopathy, ocular hypertension noted OD.   • Diabetic foot exam 8/3/2017    08/03/2017--routine diabetic foot exam reveals no evidence of diabetic foot ulcer or pre-ulcerative callus.  Distal pulses are very palpable and there is no signs of ischemia.  Sensation subjectively intact.   • Diverticulosis of colon 1/26/2015 01/26/2015--colonoscopy revealed extensive internal and external hemorrhoids. The patient had 2 sessile polyps, one at the splenic flexure and the other in the ascending colon near the hepatic flexure which were removed and sent for pathology. Left colon diverticulosis also noted. The ascending colon polyp returned tubular adenoma. The splenic flexure polyp was markedly cauterized and partially denuded. Focal hyperplastic features. No definite evidence of adenomatous change. Repeat study due 3 years.   • Elevated liver enzymes 8/3/2017    08/03/2017--routine physical.  AST elevated at 48.  ALT elevated at 61.  Suspect HEADLEY.  Hepatitis C antibody  screening is negative.  We will repeat CMP in a few weeks and if the liver enzymes remain elevated then we will proceed with a liver ultrasound.   • History of anemia 02/04/2015 12/06/2015--hemoglobin normal at 13.2, hematocrit normal at 39.5. Resolve this issue.   02/04/2015--homocystine and methylmalonic acid are normal. Serum iron low normal at 62. Iron saturation low at 16%. Colonoscopy ordered and returned one hyperplastic polyp and one tubular adenomatous polyp.   01/25/2015--routine CBC reveals a hemoglobin low at 13.1, hematocrit low at 40.3. Homocystine, methylma   • History of colon polyps, 12/18/2018--negative.  01/26/2015--tubular adenoma ×1.  Probable hyperplastic ×1.  Repeat due 3 years. 1/26/2015 12/18/2018--colonoscopy revealed multiple small and large mouth diverticula in the sigmoid and descending colon.  There was evidence of an impacted diverticulum.  No bleeding.  Nonbleeding internal hemorrhoids noted.  Grade 3.  No polyps noted.  No specimens taken.  01/26/2015--colonoscopy revealed extensive internal and external hemorrhoids. The patient had 2 sessile polyps, one at the splenic fl   • Hyperlipidemia 9/7/2007 09/07/2007--initial treatment hyperlipidemia.   • Hypothyroidism 6/18/2008 06/18/2008--initial diagnosis and treatment hypothyroidism.   • Multiple environmental allergies 5/17/2016    Patient had allergy testing as a child.   • Type 2 diabetes mellitus without complication, without long-term current use of insulin (CMS/[facility]) 7/17/2006 July 2006--initial diagnosis diabetes.   • Vitamin D deficiency 6/16/2016         Past Surgical History:   Procedure Laterality Date   • COLONOSCOPY  01/26/2015 01/26/2015--colonoscopy revealed extensive internal and external hemorrhoids. The patient had 2 sessile polyps, one at the splenic flexure and the other in the ascending colon near the hepatic flexure which were removed and sent for pathology. Left colon diverticulosis also noted.  The ascending colon polyp returned tubular adenoma. The splenic flexure polyp was markedly cauterized and partially de   • COLONOSCOPY N/A 12/18/2018 12/18/2018--colonoscopy revealed multiple small and large mouth diverticula in the sigmoid and descending colon.  There was evidence of an impacted diverticulum.  No bleeding.  Nonbleeding internal hemorrhoids noted.  Grade 3.  No polyps noted.  No specimens taken.         No Known Allergies        Current Outpatient Medications:   •  aspirin 81 MG EC tablet, Take 1 tablet by mouth daily., Disp: , Rfl:   •  Cholecalciferol (VITAMIN D) 2000 UNITS capsule, Take 1 capsule by mouth daily., Disp: , Rfl:   •  CIALIS 5 MG tablet, TAKE ONE TABLET BY MOUTH EVERY DAY, Disp: 30 tablet, Rfl: 0  •  glyburide (DIAbeta) 5 MG tablet, TAKE 1 TABLET TWICE DAILY  WITH MEALS, Disp: 180 tablet, Rfl: 2  •  Insulin Pen Needle 32G X 5 MM misc, Use 1 needle for daily injection of victoza, Disp: 100 each, Rfl: 3  •  JANUMET XR  MG tablet, TAKE 1 TABLET TWICE A DAY, Disp: 180 tablet, Rfl: 2  •  levothyroxine (Synthroid) 125 MCG tablet, TAKE 1 TABLET DAILY FOR    THYROID, Disp: 90 tablet, Rfl: 0  •  lisinopril (PRINIVIL,ZESTRIL) 10 MG tablet, TAKE 1 TABLET DAILY, Disp: 90 tablet, Rfl: 2  •  NOVOFINE PLUS 32G X 4 MM misc, , Disp: , Rfl:   •  Semaglutide,0.25 or 0.5MG/DOS, (Ozempic, 0.25 or 0.5 MG/DOSE,) 2 MG/1.5ML solution pen-injector, Inject 0.25 mg subcutaneously once weekly x4 weeks and then increase to 0.5 mg subcutaneous weekly thereafter., Disp: 3 pen, Rfl: 3  •  simvastatin (ZOCOR) 40 MG tablet, TAKE 1 TABLET EVERY NIGHT, Disp: 90 tablet, Rfl: 2      Family History   Problem Relation Age of Onset   • Diabetes Mother         Type 2         Social History     Socioeconomic History   • Marital status:      Spouse name: Not on file   • Number of children: 0   • Years of education: Not on file   • Highest education level: Bachelor's degree (e.g., BA, AB, BS)   Occupational  "History   • Occupation: Construction Firm - Head [employer]   Social Needs   • Financial resource strain: Not very hard   • Food insecurity     Worry: Never true     Inability: Never true   • Transportation needs     Medical: No     Non-medical: No   Tobacco Use   • Smoking status: Never Smoker   • Smokeless tobacco: Never Used   Substance and Sexual Activity   • Alcohol use: No     Frequency: Never     Binge frequency: Never   • Drug use: No   • Sexual activity: Yes     Partners: Female   Lifestyle   • Physical activity     Days per week: 2 days     Minutes per session: 60 min   • Stress: Not at all   Relationships   • Social connections     Talks on phone: More than three times a week     Gets together: Twice a week     Attends [employer] service: Never     Active member of club or organization: Yes     Attends meetings of clubs or organizations: More than 4 times per year     Relationship status:          Vitals:    09/30/20 0743   BP: 92/64   BP Location: Right arm   Patient Position: Sitting   Cuff Size: Adult   Pulse: 79   SpO2: 98%   Weight: 91.2 kg (201 lb)   Height: 180.3 cm (70.98\")        Body mass index is 28.05 kg/m².      Physical Exam:    General: Alert and oriented x 3.  No acute distress.  Normal affect.  Mildly overweight.  HEENT: Pupils equal, round, reactive to light; extraocular movements intact; sclerae nonicteric; pharynx, ear canals and TMs normal.  Neck: Without JVD, thyromegaly, bruit, or adenopathy.  Lungs: Clear to auscultation in all fields.  Heart: Regular rate and rhythm without murmur, rub, gallop, or click.  Abdomen: Soft, nontender, without hepatosplenomegaly or hernia.  Bowel sounds normal.  : Deferred.  Rectal: Deferred.  Extremities: Without clubbing, cyanosis, edema, or pulse deficit.  Neurologic: Intact without focal deficit.  Normal station and gait observed during ingress and egress from the examination room.  Skin: Without significant lesion.  Musculoskeletal: " Unremarkable.    September 30, 2020--routine diabetic foot exam reveals no evidence of diabetic foot ulcer or pre-ulcerative callus.  Pulses are palpable and sensation intact.    Lab/other results:    NMR reveals total cholesterol 127.  Triglycerides elevated at 215.  LDL particle number excellent 694.  Small LDL particle number excellent 449.  HDL particle number normal at 31.6.  CMP normal except creatinine slightly elevated at 1.29.  ALT slightly elevated at 52.  Hemoglobin A1c 8.3.  Hepatitis C antibody screening is negative.  Vitamin D normal at 69.6.  Thyroid function test normal.  PSA normal at 0.264.  CBC normal except hemoglobin low at 12.8 but hematocrit is normal at 38.9.  CPK normal.    Assessment/Plan:     Diagnosis Plan   1. Routine physical examination     2. Type 2 diabetes mellitus without complication, without long-term current use of insulin (CMS/[facility])  Comprehensive Metabolic Panel    Hemoglobin A1c    Microalbumin / Creatinine Urine Ratio - Urine, Clean Catch   3. Hyperlipidemia  CK    Comprehensive Metabolic Panel    NMR LipoProfile   4. Primary hypothyroidism  TSH    T4, Free    T3, Free   5. Benign essential hypertension     6. History of colon polyps, 12/18/2018--negative.  01/26/2015--tubular adenoma ×1.  Probable hyperplastic ×1.  Repeat due 3 years.     7. Multiple environmental allergies     8. Allergic rhinitis     9. Vitamin D deficiency     10. Diabetic foot exam     11. Chronic anemia  CBC (No Diff)   12. Elevated liver enzymes     13. Therapeutic drug monitoring     14. Need for influenza vaccination  Fluarix Quad >6 Months ([id number])     Patient presents with essentially normal annual physical except for the following issues:    He has type 2 diabetes which is not an optimal control.  His hemoglobin A1c was 6.510 months ago and is 7.464 months ago.  Hyperlipidemia is under good control.  Thyroid is therapeutic.  Blood pressure seems controlled.  Patient has a history of colon  polyps and is up-to-date on his colonoscopy.  Environmental allergies/allergic rhinitis currently not an issue.  Vitamin D in the normal range.  Chronic anemia is mild and stable.  He has mild elevation of liver enzymes likely related to fatty liver.    Several preventative health issues discussed including review of vaccinations and recommendations, including dietary issues, exercise and weight loss.  Safe sex practices discussed.  Patient advised to wear seatbelt whenever driving and avoid texting and driving.  Also advised to look both ways before crossing the street.  Colon cancer prevention discussed and is up-to-date with colonoscopy.  Advised to avoid tobacco products and minimize alcohol consumption.    Plan is as follows: Strongly recommend low carbohydrate diet, exercise, and weight loss.  I will not make any changes to his current medical regimen at the present time.  Influenza vaccine given.  I will have patient follow-up in 6 months with lab prior to reassess the situation or follow-up as needed.    Procedures

## 2020-10-22 DIAGNOSIS — E03.9 HYPOTHYROIDISM, UNSPECIFIED TYPE: Chronic | ICD-10-CM

## 2020-10-22 RX ORDER — LEVOTHYROXINE SODIUM 0.12 MG/1
TABLET ORAL
Qty: 90 TABLET | Refills: 1 | Status: SHIPPED | OUTPATIENT
Start: 2020-10-22 | End: 2021-05-19

## 2021-01-14 RX ORDER — TADALAFIL 5 MG/1
5 TABLET ORAL DAILY
Qty: 30 TABLET | Refills: 5 | Status: SHIPPED | OUTPATIENT
Start: 2021-01-14 | End: 2022-02-25 | Stop reason: SDUPTHER

## 2021-02-08 DIAGNOSIS — E11.9 TYPE 2 DIABETES MELLITUS WITHOUT COMPLICATION, WITHOUT LONG-TERM CURRENT USE OF INSULIN (HCC): Chronic | ICD-10-CM

## 2021-02-09 RX ORDER — SEMAGLUTIDE 1.34 MG/ML
INJECTION, SOLUTION SUBCUTANEOUS
Qty: 3 PEN | Refills: 3 | Status: SHIPPED | OUTPATIENT
Start: 2021-02-09 | End: 2021-06-16 | Stop reason: SDUPTHER

## 2021-03-16 ENCOUNTER — BULK ORDERING (OUTPATIENT)
Dept: CASE MANAGEMENT | Facility: OTHER | Age: 66
End: 2021-03-16

## 2021-03-16 DIAGNOSIS — Z23 IMMUNIZATION DUE: ICD-10-CM

## 2021-03-24 ENCOUNTER — LAB (OUTPATIENT)
Dept: LAB | Facility: HOSPITAL | Age: 66
End: 2021-03-24

## 2021-03-24 DIAGNOSIS — D64.9 CHRONIC ANEMIA: Chronic | ICD-10-CM

## 2021-03-24 DIAGNOSIS — E11.9 TYPE 2 DIABETES MELLITUS WITHOUT COMPLICATION, WITHOUT LONG-TERM CURRENT USE OF INSULIN (HCC): Chronic | ICD-10-CM

## 2021-03-24 DIAGNOSIS — E03.9 PRIMARY HYPOTHYROIDISM: Chronic | ICD-10-CM

## 2021-03-24 DIAGNOSIS — E78.2 MIXED HYPERLIPIDEMIA: Chronic | ICD-10-CM

## 2021-03-24 LAB
ALBUMIN SERPL-MCNC: 4.3 G/DL (ref 3.5–5.2)
ALBUMIN UR-MCNC: <1.2 MG/DL
ALBUMIN/GLOB SERPL: 1.7 G/DL
ALP SERPL-CCNC: 74 U/L (ref 39–117)
ALT SERPL W P-5'-P-CCNC: 42 U/L (ref 1–41)
ANION GAP SERPL CALCULATED.3IONS-SCNC: 10.1 MMOL/L (ref 5–15)
AST SERPL-CCNC: 37 U/L (ref 1–40)
BILIRUB SERPL-MCNC: 0.7 MG/DL (ref 0–1.2)
BUN SERPL-MCNC: 13 MG/DL (ref 8–23)
BUN/CREAT SERPL: 12.5 (ref 7–25)
CALCIUM SPEC-SCNC: 8.6 MG/DL (ref 8.6–10.5)
CHLORIDE SERPL-SCNC: 106 MMOL/L (ref 98–107)
CK SERPL-CCNC: 89 U/L (ref 20–200)
CO2 SERPL-SCNC: 22.9 MMOL/L (ref 22–29)
CREAT SERPL-MCNC: 1.04 MG/DL (ref 0.76–1.27)
CREAT UR-MCNC: 137.7 MG/DL
DEPRECATED RDW RBC AUTO: 40.4 FL (ref 37–54)
ERYTHROCYTE [DISTWIDTH] IN BLOOD BY AUTOMATED COUNT: 12.4 % (ref 12.3–15.4)
GFR SERPL CREATININE-BSD FRML MDRD: 72 ML/MIN/1.73
GLOBULIN UR ELPH-MCNC: 2.6 GM/DL
GLUCOSE SERPL-MCNC: 160 MG/DL (ref 65–99)
HBA1C MFR BLD: 8.9 % (ref 4.8–5.6)
HCT VFR BLD AUTO: 37.4 % (ref 37.5–51)
HGB BLD-MCNC: 12.6 G/DL (ref 13–17.7)
MCH RBC QN AUTO: 29.9 PG (ref 26.6–33)
MCHC RBC AUTO-ENTMCNC: 33.7 G/DL (ref 31.5–35.7)
MCV RBC AUTO: 88.8 FL (ref 79–97)
MICROALBUMIN/CREAT UR: NORMAL MG/G{CREAT}
PLATELET # BLD AUTO: 151 10*3/MM3 (ref 140–450)
PMV BLD AUTO: 11.6 FL (ref 6–12)
POTASSIUM SERPL-SCNC: 4.3 MMOL/L (ref 3.5–5.2)
PROT SERPL-MCNC: 6.9 G/DL (ref 6–8.5)
RBC # BLD AUTO: 4.21 10*6/MM3 (ref 4.14–5.8)
SODIUM SERPL-SCNC: 139 MMOL/L (ref 136–145)
T3FREE SERPL-MCNC: 2.95 PG/ML (ref 2–4.4)
T4 FREE SERPL-MCNC: 1.72 NG/DL (ref 0.93–1.7)
TSH SERPL DL<=0.05 MIU/L-ACNC: 0.53 UIU/ML (ref 0.27–4.2)
WBC # BLD AUTO: 5.6 10*3/MM3 (ref 3.4–10.8)

## 2021-03-24 PROCEDURE — 82550 ASSAY OF CK (CPK): CPT

## 2021-03-24 PROCEDURE — 36415 COLL VENOUS BLD VENIPUNCTURE: CPT

## 2021-03-24 PROCEDURE — 82043 UR ALBUMIN QUANTITATIVE: CPT

## 2021-03-24 PROCEDURE — 84439 ASSAY OF FREE THYROXINE: CPT

## 2021-03-24 PROCEDURE — 84481 FREE ASSAY (FT-3): CPT

## 2021-03-24 PROCEDURE — 82570 ASSAY OF URINE CREATININE: CPT

## 2021-03-24 PROCEDURE — 80061 LIPID PANEL: CPT

## 2021-03-24 PROCEDURE — 83036 HEMOGLOBIN GLYCOSYLATED A1C: CPT

## 2021-03-24 PROCEDURE — 85027 COMPLETE CBC AUTOMATED: CPT

## 2021-03-24 PROCEDURE — 83704 LIPOPROTEIN BLD QUAN PART: CPT

## 2021-03-24 PROCEDURE — 84443 ASSAY THYROID STIM HORMONE: CPT

## 2021-03-24 PROCEDURE — 80053 COMPREHEN METABOLIC PANEL: CPT

## 2021-03-26 LAB
CHOLEST SERPL-MCNC: 113 MG/DL (ref 100–199)
HDL SERPL-SCNC: 29.3 UMOL/L
HDLC SERPL-MCNC: 38 MG/DL
LDL SERPL QN: 20.2 NM
LDL SERPL-SCNC: 544 NMOL/L
LDL SMALL SERPL-SCNC: 306 NMOL/L
LDLC SERPL CALC-MCNC: 46 MG/DL (ref 0–99)
TRIGL SERPL-MCNC: 178 MG/DL (ref 0–149)

## 2021-03-31 ENCOUNTER — OFFICE VISIT (OUTPATIENT)
Dept: INTERNAL MEDICINE | Facility: CLINIC | Age: 66
End: 2021-03-31

## 2021-03-31 VITALS
HEART RATE: 73 BPM | OXYGEN SATURATION: 99 % | WEIGHT: 202 LBS | BODY MASS INDEX: 28.28 KG/M2 | SYSTOLIC BLOOD PRESSURE: 100 MMHG | HEIGHT: 71 IN | DIASTOLIC BLOOD PRESSURE: 60 MMHG

## 2021-03-31 DIAGNOSIS — Z00.00 ROUTINE PHYSICAL EXAMINATION: ICD-10-CM

## 2021-03-31 DIAGNOSIS — E78.2 MIXED HYPERLIPIDEMIA: Chronic | ICD-10-CM

## 2021-03-31 DIAGNOSIS — E11.9 TYPE 2 DIABETES MELLITUS WITHOUT COMPLICATION, WITHOUT LONG-TERM CURRENT USE OF INSULIN (HCC): Primary | Chronic | ICD-10-CM

## 2021-03-31 DIAGNOSIS — D64.9 CHRONIC ANEMIA: Chronic | ICD-10-CM

## 2021-03-31 DIAGNOSIS — Z86.010 HISTORY OF COLON POLYPS: Chronic | ICD-10-CM

## 2021-03-31 DIAGNOSIS — I10 BENIGN ESSENTIAL HYPERTENSION: Chronic | ICD-10-CM

## 2021-03-31 DIAGNOSIS — Z51.81 THERAPEUTIC DRUG MONITORING: ICD-10-CM

## 2021-03-31 DIAGNOSIS — E55.9 VITAMIN D DEFICIENCY: Chronic | ICD-10-CM

## 2021-03-31 DIAGNOSIS — Z91.09 MULTIPLE ENVIRONMENTAL ALLERGIES: Chronic | ICD-10-CM

## 2021-03-31 DIAGNOSIS — R74.8 ELEVATED LIVER ENZYMES: Chronic | ICD-10-CM

## 2021-03-31 DIAGNOSIS — E03.9 PRIMARY HYPOTHYROIDISM: Chronic | ICD-10-CM

## 2021-03-31 PROCEDURE — 99214 OFFICE O/P EST MOD 30 MIN: CPT | Performed by: INTERNAL MEDICINE

## 2021-03-31 NOTE — PROGRESS NOTES
03/31/2021    Patient Information  Lionel Carson                                                                                          [address]  [address]  [facility] [address]      1955  [unfilled]  There is no work phone number on file.    Chief Complaint:     Follow-up lab work in order to monitor chronic medical issues listed in history of present illness.  No new acute complaints.    History of Present Illness:    Patient with a history of type 2 diabetes, hyperlipidemia, hypothyroidism, hypertension, history of colon polyps, environmental allergies, vitamin D deficiency, chronic anemia, elevated liver enzymes.  The presents today for follow-up with lab prior in order to monitor his chronic medical issues.  His past medical history reviewed and updated were necessary including health maintenance parameters.  This reveals he is currently up-to-date or else accounted for.    Review of Systems   Constitutional: Negative.   HENT: Negative.    Eyes: Negative.    Cardiovascular: Negative.    Respiratory: Negative.    Endocrine: Negative.    Hematologic/Lymphatic: Negative.    Skin: Negative.    Musculoskeletal: Negative.    Gastrointestinal: Negative.    Genitourinary: Negative.    Neurological: Negative.    Psychiatric/Behavioral: Negative.    Allergic/Immunologic: Negative.        Active Problems:    Patient Active Problem List   Diagnosis   • Allergic rhinitis   • Benign essential hypertension   • History of colon polyps, 12/18/2018--negative.  01/26/2015--tubular adenoma ×1.  Probable hyperplastic ×1.  Repeat due 3 years.   • Diverticulosis of colon   • Hyperlipidemia   • Primary hypothyroidism   • Multiple environmental allergies   • Type 2 diabetes mellitus without complication, without long-term current use of insulin (CMS/[facility])   • Vitamin D deficiency   • Therapeutic drug monitoring   • Routine physical examination   • Diabetic foot exam   • Diabetic eye exam (CMS/[facility])   • Chronic  anemia   • Elevated liver enzymes         Past Medical History:   Diagnosis Date   • Benign essential hypertension 9/7/2007 09/07/2007--initial treatment with lisinopril that was primarily for renal protection.   • Chronic anemia 8/3/2017    08/03/2017--routine physical.  Hemoglobin is low at 13.3.  Hematocrit low at 40.0.  Indices are normal.  Repeat CBC, iron studies, homocystine, and methylmalonic acid ordered.  Patient will follow-up on the phone for the results and possible further instructions.  He is aware that it takes 2 weeks for one lab test to return.   • Diabetic eye exam (CMS/[facility]) 3/17/2017    03/17/2017--routine diabetic eye exam reveals no evidence of diabetic retinopathy.  Cataracts both eyes.  Observation recommended.  07/09/2014--routine ophthalmologic examination reveals no evidence of diabetic retinopathy.     02/02/2009--eye examination revealed no evidence of diabetic retinopathy, ocular hypertension noted OD.   • Diabetic foot exam 8/3/2017    08/03/2017--routine diabetic foot exam reveals no evidence of diabetic foot ulcer or pre-ulcerative callus.  Distal pulses are very palpable and there is no signs of ischemia.  Sensation subjectively intact.   • Diverticulosis of colon 1/26/2015 01/26/2015--colonoscopy revealed extensive internal and external hemorrhoids. The patient had 2 sessile polyps, one at the splenic flexure and the other in the ascending colon near the hepatic flexure which were removed and sent for pathology. Left colon diverticulosis also noted. The ascending colon polyp returned tubular adenoma. The splenic flexure polyp was markedly cauterized and partially denuded. Focal hyperplastic features. No definite evidence of adenomatous change. Repeat study due 3 years.   • Elevated liver enzymes 8/3/2017    08/03/2017--routine physical.  AST elevated at 48.  ALT elevated at 61.  Suspect HEADLEY.  Hepatitis C antibody screening is negative.  We will repeat CMP in a few weeks and  if the liver enzymes remain elevated then we will proceed with a liver ultrasound.   • History of anemia 02/04/2015 12/06/2015--hemoglobin normal at 13.2, hematocrit normal at 39.5. Resolve this issue.   02/04/2015--homocystine and methylmalonic acid are normal. Serum iron low normal at 62. Iron saturation low at 16%. Colonoscopy ordered and returned one hyperplastic polyp and one tubular adenomatous polyp.   01/25/2015--routine CBC reveals a hemoglobin low at 13.1, hematocrit low at 40.3. Homocystine, methylma   • History of colon polyps, 12/18/2018--negative.  01/26/2015--tubular adenoma ×1.  Probable hyperplastic ×1.  Repeat due 3 years. 1/26/2015 12/18/2018--colonoscopy revealed multiple small and large mouth diverticula in the sigmoid and descending colon.  There was evidence of an impacted diverticulum.  No bleeding.  Nonbleeding internal hemorrhoids noted.  Grade 3.  No polyps noted.  No specimens taken.  01/26/2015--colonoscopy revealed extensive internal and external hemorrhoids. The patient had 2 sessile polyps, one at the splenic fl   • Hyperlipidemia 9/7/2007 09/07/2007--initial treatment hyperlipidemia.   • Hypothyroidism 6/18/2008 06/18/2008--initial diagnosis and treatment hypothyroidism.   • Multiple environmental allergies 5/17/2016    Patient had allergy testing as a child.   • Type 2 diabetes mellitus without complication, without long-term current use of insulin (CMS/[facility]) 7/17/2006 July 2006--initial diagnosis diabetes.   • Vitamin D deficiency 6/16/2016         Past Surgical History:   Procedure Laterality Date   • COLONOSCOPY  01/26/2015 01/26/2015--colonoscopy revealed extensive internal and external hemorrhoids. The patient had 2 sessile polyps, one at the splenic flexure and the other in the ascending colon near the hepatic flexure which were removed and sent for pathology. Left colon diverticulosis also noted. The ascending colon polyp returned tubular adenoma. The splenic  flexure polyp was markedly cauterized and partially de   • COLONOSCOPY N/A 12/18/2018 12/18/2018--colonoscopy revealed multiple small and large mouth diverticula in the sigmoid and descending colon.  There was evidence of an impacted diverticulum.  No bleeding.  Nonbleeding internal hemorrhoids noted.  Grade 3.  No polyps noted.  No specimens taken.         No Known Allergies        Current Outpatient Medications:   •  aspirin 81 MG EC tablet, Take 1 tablet by mouth daily., Disp: , Rfl:   •  Cholecalciferol (VITAMIN D) 2000 UNITS capsule, Take 1 capsule by mouth daily., Disp: , Rfl:   •  glyburide (DIAbeta) 5 MG tablet, TAKE 1 TABLET TWICE DAILY  WITH MEALS, Disp: 180 tablet, Rfl: 2  •  JANUMET XR  MG tablet, TAKE 1 TABLET TWICE A DAY, Disp: 180 tablet, Rfl: 2  •  levothyroxine (Synthroid) 125 MCG tablet, TAKE 1 TABLET DAILY FOR    THYROID, Disp: 90 tablet, Rfl: 1  •  lisinopril (PRINIVIL,ZESTRIL) 10 MG tablet, TAKE 1 TABLET DAILY, Disp: 90 tablet, Rfl: 2  •  Semaglutide,0.25 or 0.5MG/DOS, (Ozempic, 0.25 or 0.5 MG/DOSE,) 2 MG/1.5ML solution pen-injector, Inject 0.25 mg subcutaneously once weekly x4 weeks and then increase to 0.5 mg subcutaneous weekly thereafter., Disp: 3 pen, Rfl: 3  •  simvastatin (ZOCOR) 40 MG tablet, TAKE 1 TABLET EVERY NIGHT, Disp: 90 tablet, Rfl: 2  •  tadalafil (Cialis) 5 MG tablet, Take 1 tablet by mouth Daily., Disp: 30 tablet, Rfl: 5      Family History   Problem Relation Age of Onset   • Diabetes Mother         Type 2         Social History     Socioeconomic History   • Marital status:      Spouse name: Not on file   • Number of children: 0   • Years of education: Not on file   • Highest education level: Bachelor's degree (e.g., BA, AB, BS)   Tobacco Use   • Smoking status: Never Smoker   • Smokeless tobacco: Never Used   Vaping Use   • Vaping Use: Never used   Substance and Sexual Activity   • Alcohol use: No   • Drug use: No   • Sexual activity: Yes     Partners:  "Female         Vitals:    03/31/21 0801   BP: 100/60   BP Location: Left arm   Patient Position: Sitting   Cuff Size: Adult   Pulse: 73   SpO2: 99%   Weight: 91.6 kg (202 lb)   Height: 180.3 cm (70.98\")        Body mass index is 28.19 kg/m².      Physical Exam:    General: Alert and oriented x 3.  No acute distress.  Normal affect.  HEENT: Pupils equal, round, reactive to light; extraocular movements intact; sclerae nonicteric; pharynx, ear canals and TMs normal.  Neck: Without JVD, thyromegaly, bruit, or adenopathy.  Lungs: Clear to auscultation in all fields.  Heart: Regular rate and rhythm without murmur, rub, gallop, or click.  Abdomen: Soft, nontender, without hepatosplenomegaly or hernia.  Bowel sounds normal.  : Deferred.  Rectal: Deferred.  Extremities: Without clubbing, cyanosis, edema, or pulse deficit.  Neurologic: Intact without focal deficit.  Normal station and gait observed during ingress and egress from the examination room.  Skin: Without significant lesion.  Musculoskeletal: Unremarkable.    Lab/other results:    NMR reveals total cholesterol 113.  Triglycerides slightly elevated 178.  LDL particle number excellent at 544.  Small LDL particle number excellent 306.  HDL particle number low at 29.3.  CMP normal except glucose 160.  Hemoglobin A1c 8.9.  Microalbumin/creatinine ratio could not be calculated.  Thyroid function test normal.  CPK normal.  Hemoglobin low at 12.6 and hematocrit low at 37.4.    Assessment/Plan:     Diagnosis Plan   1. Type 2 diabetes mellitus without complication, without long-term current use of insulin (CMS/[facility])  Comprehensive Metabolic Panel    Hemoglobin A1c    Microalbumin / Creatinine Urine Ratio - Urine, Clean Catch    Urinalysis With Microscopic If Indicated (No Culture) - Urine, Clean Catch   2. Hyperlipidemia  CK    Comprehensive Metabolic Panel    NMR LipoProfile   3. Primary hypothyroidism  TSH    T4, Free    T3, Free   4. Benign essential hypertension     5. " History of colon polyps, 12/18/2018--negative.  01/26/2015--tubular adenoma ×1.  Probable hyperplastic ×1.  Repeat due 3 years.     6. Multiple environmental allergies     7. Vitamin D deficiency  Vitamin D 25 Hydroxy   8. Chronic anemia  CBC (No Diff)   9. Elevated liver enzymes     10. Therapeutic drug monitoring  PSA DIAGNOSTIC   11. Routine physical examination  CBC (No Diff)    CK    Comprehensive Metabolic Panel    Hemoglobin A1c    Microalbumin / Creatinine Urine Ratio - Urine, Clean Catch    NMR LipoProfile    Vitamin D 25 Hydroxy    Urinalysis With Microscopic If Indicated (No Culture) - Urine, Clean Catch    TSH    T4, Free    T3, Free    PSA DIAGNOSTIC     Patient has type 2 diabetes which is not at goal.  Hyperlipidemia is under excellent control on the current regimen.  Thyroid is therapeutic on 125 mcg of levothyroxine.  Blood pressure seems to be controlled.  Patient has history of colon polyps and is up-to-date on his colonoscopy.  His environmental allergies are currently not an issue.  Chronic anemia is mild and stable.  This has been thoroughly evaluated.  Vitamin D in the normal range.  Elevated liver enzymes have resolved but we will continue to monitor.    Plan is as follows: Patient will work hard on low carbohydrate diet, exercise, and weight loss.  If his hemoglobin A1c is still above 8 at the next follow-up then we may need to consider addition of medication such as Farxiga.  I will have him follow-up after 10/1/2021 with lab prior for his annual physical or follow-up as needed.        Procedures

## 2021-04-09 RX ORDER — SIMVASTATIN 40 MG
TABLET ORAL
Qty: 90 TABLET | Refills: 2 | Status: SHIPPED | OUTPATIENT
Start: 2021-04-09 | End: 2022-01-04

## 2021-04-09 RX ORDER — LISINOPRIL 10 MG/1
TABLET ORAL
Qty: 90 TABLET | Refills: 2 | Status: SHIPPED | OUTPATIENT
Start: 2021-04-09 | End: 2022-01-04

## 2021-04-09 RX ORDER — GLYBURIDE 5 MG/1
TABLET ORAL
Qty: 180 TABLET | Refills: 2 | Status: SHIPPED | OUTPATIENT
Start: 2021-04-09 | End: 2022-01-04

## 2021-04-09 RX ORDER — SITAGLIPTIN AND METFORMIN HYDROCHLORIDE 1000; 50 MG/1; MG/1
TABLET, FILM COATED, EXTENDED RELEASE ORAL
Qty: 180 TABLET | Refills: 2 | Status: SHIPPED | OUTPATIENT
Start: 2021-04-09 | End: 2022-01-04

## 2021-05-19 DIAGNOSIS — E03.9 HYPOTHYROIDISM, UNSPECIFIED TYPE: Chronic | ICD-10-CM

## 2021-05-19 RX ORDER — LEVOTHYROXINE SODIUM 0.12 MG/1
TABLET ORAL
Qty: 90 TABLET | Refills: 1 | Status: SHIPPED | OUTPATIENT
Start: 2021-05-19 | End: 2021-09-07 | Stop reason: SDUPTHER

## 2021-06-16 DIAGNOSIS — E11.9 TYPE 2 DIABETES MELLITUS WITHOUT COMPLICATION, WITHOUT LONG-TERM CURRENT USE OF INSULIN (HCC): Chronic | ICD-10-CM

## 2021-06-17 RX ORDER — SEMAGLUTIDE 1.34 MG/ML
INJECTION, SOLUTION SUBCUTANEOUS
Qty: 3 PEN | Refills: 3 | Status: SHIPPED | OUTPATIENT
Start: 2021-06-17 | End: 2021-09-07 | Stop reason: SDUPTHER

## 2021-09-07 DIAGNOSIS — E03.9 HYPOTHYROIDISM, UNSPECIFIED TYPE: Chronic | ICD-10-CM

## 2021-09-07 DIAGNOSIS — E11.9 TYPE 2 DIABETES MELLITUS WITHOUT COMPLICATION, WITHOUT LONG-TERM CURRENT USE OF INSULIN (HCC): Chronic | ICD-10-CM

## 2021-09-08 RX ORDER — LEVOTHYROXINE SODIUM 0.12 MG/1
TABLET ORAL
Qty: 90 TABLET | Refills: 1 | Status: SHIPPED | OUTPATIENT
Start: 2021-09-08 | End: 2022-03-07

## 2021-09-08 RX ORDER — SEMAGLUTIDE 1.34 MG/ML
INJECTION, SOLUTION SUBCUTANEOUS
Qty: 3 PEN | Refills: 3 | Status: SHIPPED | OUTPATIENT
Start: 2021-09-08 | End: 2022-01-19 | Stop reason: SDUPTHER

## 2021-09-29 ENCOUNTER — LAB (OUTPATIENT)
Dept: LAB | Facility: HOSPITAL | Age: 66
End: 2021-09-29

## 2021-09-29 DIAGNOSIS — D64.9 CHRONIC ANEMIA: Chronic | ICD-10-CM

## 2021-09-29 DIAGNOSIS — Z00.00 ROUTINE PHYSICAL EXAMINATION: ICD-10-CM

## 2021-09-29 LAB
DEPRECATED RDW RBC AUTO: 39.9 FL (ref 37–54)
ERYTHROCYTE [DISTWIDTH] IN BLOOD BY AUTOMATED COUNT: 12.4 % (ref 12.3–15.4)
HCT VFR BLD AUTO: 37.3 % (ref 37.5–51)
HGB BLD-MCNC: 12.1 G/DL (ref 13–17.7)
MCH RBC QN AUTO: 28.7 PG (ref 26.6–33)
MCHC RBC AUTO-ENTMCNC: 32.4 G/DL (ref 31.5–35.7)
MCV RBC AUTO: 88.6 FL (ref 79–97)
PLATELET # BLD AUTO: 161 10*3/MM3 (ref 140–450)
PMV BLD AUTO: 11.7 FL (ref 6–12)
RBC # BLD AUTO: 4.21 10*6/MM3 (ref 4.14–5.8)
WBC # BLD AUTO: 6.56 10*3/MM3 (ref 3.4–10.8)

## 2021-09-29 PROCEDURE — 85027 COMPLETE CBC AUTOMATED: CPT

## 2021-09-29 PROCEDURE — 36415 COLL VENOUS BLD VENIPUNCTURE: CPT

## 2021-10-06 ENCOUNTER — LAB (OUTPATIENT)
Dept: LAB | Facility: HOSPITAL | Age: 66
End: 2021-10-06

## 2021-10-06 ENCOUNTER — OFFICE VISIT (OUTPATIENT)
Dept: INTERNAL MEDICINE | Facility: CLINIC | Age: 66
End: 2021-10-06

## 2021-10-06 VITALS
WEIGHT: 189.4 LBS | OXYGEN SATURATION: 98 % | SYSTOLIC BLOOD PRESSURE: 108 MMHG | DIASTOLIC BLOOD PRESSURE: 60 MMHG | BODY MASS INDEX: 26.52 KG/M2 | RESPIRATION RATE: 15 BRPM | HEIGHT: 71 IN | HEART RATE: 70 BPM

## 2021-10-06 DIAGNOSIS — E03.9 PRIMARY HYPOTHYROIDISM: Chronic | ICD-10-CM

## 2021-10-06 DIAGNOSIS — E78.2 MIXED HYPERLIPIDEMIA: Chronic | ICD-10-CM

## 2021-10-06 DIAGNOSIS — Z23 NEED FOR INFLUENZA VACCINATION: ICD-10-CM

## 2021-10-06 DIAGNOSIS — I10 BENIGN ESSENTIAL HYPERTENSION: Chronic | ICD-10-CM

## 2021-10-06 DIAGNOSIS — D64.9 CHRONIC ANEMIA: Chronic | ICD-10-CM

## 2021-10-06 DIAGNOSIS — Z51.81 THERAPEUTIC DRUG MONITORING: ICD-10-CM

## 2021-10-06 DIAGNOSIS — Z86.010 HISTORY OF COLON POLYPS: Chronic | ICD-10-CM

## 2021-10-06 DIAGNOSIS — E55.9 VITAMIN D DEFICIENCY: Chronic | ICD-10-CM

## 2021-10-06 DIAGNOSIS — Z00.00 ROUTINE PHYSICAL EXAMINATION: Primary | ICD-10-CM

## 2021-10-06 DIAGNOSIS — J30.1 CHRONIC SEASONAL ALLERGIC RHINITIS DUE TO POLLEN: ICD-10-CM

## 2021-10-06 DIAGNOSIS — Z91.09 MULTIPLE ENVIRONMENTAL ALLERGIES: Chronic | ICD-10-CM

## 2021-10-06 DIAGNOSIS — E11.9 ENCOUNTER FOR DIABETIC FOOT EXAM (HCC): Chronic | ICD-10-CM

## 2021-10-06 DIAGNOSIS — E11.9 TYPE 2 DIABETES MELLITUS WITHOUT COMPLICATION, WITHOUT LONG-TERM CURRENT USE OF INSULIN (HCC): Chronic | ICD-10-CM

## 2021-10-06 DIAGNOSIS — R74.8 ELEVATED LIVER ENZYMES: Chronic | ICD-10-CM

## 2021-10-06 PROBLEM — IMO0002 NUCLEAR CATARACT: Status: ACTIVE | Noted: 2017-03-17

## 2021-10-06 PROBLEM — IMO0002 NUCLEAR CATARACT: Chronic | Status: ACTIVE | Noted: 2017-03-17

## 2021-10-06 LAB
25(OH)D3 SERPL-MCNC: 71.8 NG/ML (ref 30–100)
ALBUMIN SERPL-MCNC: 4.4 G/DL (ref 3.5–5.2)
ALBUMIN UR-MCNC: <1.2 MG/DL
ALBUMIN/GLOB SERPL: 1.5 G/DL
ALP SERPL-CCNC: 69 U/L (ref 39–117)
ALT SERPL W P-5'-P-CCNC: 21 U/L (ref 1–41)
ANION GAP SERPL CALCULATED.3IONS-SCNC: 11.4 MMOL/L (ref 5–15)
AST SERPL-CCNC: 17 U/L (ref 1–40)
BILIRUB SERPL-MCNC: 0.9 MG/DL (ref 0–1.2)
BILIRUB UR QL STRIP: NEGATIVE
BUN SERPL-MCNC: 18 MG/DL (ref 8–23)
BUN/CREAT SERPL: 15.5 (ref 7–25)
CALCIUM SPEC-SCNC: 9.8 MG/DL (ref 8.6–10.5)
CHLORIDE SERPL-SCNC: 104 MMOL/L (ref 98–107)
CK SERPL-CCNC: 73 U/L (ref 20–200)
CLARITY UR: CLEAR
CO2 SERPL-SCNC: 23.6 MMOL/L (ref 22–29)
COLOR UR: YELLOW
CREAT SERPL-MCNC: 1.16 MG/DL (ref 0.76–1.27)
CREAT UR-MCNC: 138.7 MG/DL
GFR SERPL CREATININE-BSD FRML MDRD: 63 ML/MIN/1.73
GLOBULIN UR ELPH-MCNC: 2.9 GM/DL
GLUCOSE SERPL-MCNC: 164 MG/DL (ref 65–99)
GLUCOSE UR STRIP-MCNC: NEGATIVE MG/DL
HBA1C MFR BLD: 7.15 % (ref 4.8–5.6)
HGB UR QL STRIP.AUTO: NEGATIVE
KETONES UR QL STRIP: NEGATIVE
LEUKOCYTE ESTERASE UR QL STRIP.AUTO: NEGATIVE
MICROALBUMIN/CREAT UR: NORMAL MG/G{CREAT}
NITRITE UR QL STRIP: NEGATIVE
PH UR STRIP.AUTO: 5.5 [PH] (ref 5–8)
POTASSIUM SERPL-SCNC: 4.2 MMOL/L (ref 3.5–5.2)
PROT SERPL-MCNC: 7.3 G/DL (ref 6–8.5)
PROT UR QL STRIP: NEGATIVE
PSA SERPL-MCNC: 0.24 NG/ML (ref 0–4)
SODIUM SERPL-SCNC: 139 MMOL/L (ref 136–145)
SP GR UR STRIP: 1.02 (ref 1–1.03)
T3FREE SERPL-MCNC: 3.57 PG/ML (ref 2–4.4)
T4 FREE SERPL-MCNC: 1.99 NG/DL (ref 0.93–1.7)
TSH SERPL DL<=0.05 MIU/L-ACNC: 0.09 UIU/ML (ref 0.27–4.2)
UROBILINOGEN UR QL STRIP: NORMAL

## 2021-10-06 PROCEDURE — 80053 COMPREHEN METABOLIC PANEL: CPT | Performed by: INTERNAL MEDICINE

## 2021-10-06 PROCEDURE — 99396 PREV VISIT EST AGE 40-64: CPT | Performed by: INTERNAL MEDICINE

## 2021-10-06 PROCEDURE — 82550 ASSAY OF CK (CPK): CPT | Performed by: INTERNAL MEDICINE

## 2021-10-06 PROCEDURE — 82043 UR ALBUMIN QUANTITATIVE: CPT | Performed by: INTERNAL MEDICINE

## 2021-10-06 PROCEDURE — 82306 VITAMIN D 25 HYDROXY: CPT | Performed by: INTERNAL MEDICINE

## 2021-10-06 PROCEDURE — 84443 ASSAY THYROID STIM HORMONE: CPT | Performed by: INTERNAL MEDICINE

## 2021-10-06 PROCEDURE — 83704 LIPOPROTEIN BLD QUAN PART: CPT | Performed by: INTERNAL MEDICINE

## 2021-10-06 PROCEDURE — 84481 FREE ASSAY (FT-3): CPT | Performed by: INTERNAL MEDICINE

## 2021-10-06 PROCEDURE — 84153 ASSAY OF PSA TOTAL: CPT | Performed by: INTERNAL MEDICINE

## 2021-10-06 PROCEDURE — 83036 HEMOGLOBIN GLYCOSYLATED A1C: CPT | Performed by: INTERNAL MEDICINE

## 2021-10-06 PROCEDURE — 90662 IIV NO PRSV INCREASED AG IM: CPT | Performed by: INTERNAL MEDICINE

## 2021-10-06 PROCEDURE — 81003 URINALYSIS AUTO W/O SCOPE: CPT | Performed by: INTERNAL MEDICINE

## 2021-10-06 PROCEDURE — 82570 ASSAY OF URINE CREATININE: CPT | Performed by: INTERNAL MEDICINE

## 2021-10-06 PROCEDURE — 80061 LIPID PANEL: CPT | Performed by: INTERNAL MEDICINE

## 2021-10-06 PROCEDURE — 90471 IMMUNIZATION ADMIN: CPT | Performed by: INTERNAL MEDICINE

## 2021-10-06 PROCEDURE — 84439 ASSAY OF FREE THYROXINE: CPT | Performed by: INTERNAL MEDICINE

## 2021-10-06 PROCEDURE — 36415 COLL VENOUS BLD VENIPUNCTURE: CPT | Performed by: INTERNAL MEDICINE

## 2021-10-06 NOTE — PROGRESS NOTES
10/06/2021    Patient Information  Lionel Carson                                                                                          [address]  [address]  [facility] [address]      1955  [unfilled]  There is no work phone number on file.    Chief Complaint:     Routine physical examination and follow-up blood work.  No new acute complaints.    History of Present Illness:    Patient with type 2 diabetes, hyperlipidemia, hypothyroidism, hypertension, elevated liver enzymes, vitamin D deficiency, history of colon polyps, multiple environmental allergies, chronic anemia.  He presents today for his routine annual physical exam and follow-up blood work.  His past medical history reviewed and updated were necessary including health maintenance parameters.  This reveals he will be up-to-date or else accounted for after today's visit.    Review of Systems   Constitutional: Negative.   HENT: Negative.    Eyes: Negative.    Cardiovascular: Negative.    Respiratory: Negative.    Endocrine: Negative.    Hematologic/Lymphatic: Negative.    Skin: Negative.    Musculoskeletal: Negative.    Gastrointestinal: Negative.    Genitourinary: Negative.    Neurological: Negative.    Psychiatric/Behavioral: Negative.    Allergic/Immunologic: Negative.        Active Problems:    Patient Active Problem List   Diagnosis   • Allergic rhinitis   • Benign essential hypertension   • History of colon polyps, 12/18/2018--negative.  01/26/2015--tubular adenoma ×1.  Probable hyperplastic ×1.  Repeat due 3 years.   • Diverticulosis of colon   • Hyperlipidemia   • Primary hypothyroidism   • Multiple environmental allergies   • Type 2 diabetes mellitus without complication, without long-term current use of insulin (HCC)   • Vitamin D deficiency   • Therapeutic drug monitoring   • Routine physical examination   • Diabetic foot exam   • Diabetic eye exam (HCC)   • Chronic anemia   • Elevated liver enzymes   • Nuclear cataract          Past Medical History:   Diagnosis Date   • Benign essential hypertension 9/7/2007 09/07/2007--initial treatment with lisinopril that was primarily for renal protection.   • Chronic anemia 8/3/2017    08/03/2017--routine physical.  Hemoglobin is low at 13.3.  Hematocrit low at 40.0.  Indices are normal.  Repeat CBC, iron studies, homocystine, and methylmalonic acid ordered.  Patient will follow-up on the phone for the results and possible further instructions.  He is aware that it takes 2 weeks for one lab test to return.   • Diabetic eye exam (HCC) 3/17/2017    03/17/2017--routine diabetic eye exam reveals no evidence of diabetic retinopathy.  Cataracts both eyes.  Observation recommended.  07/09/2014--routine ophthalmologic examination reveals no evidence of diabetic retinopathy.     02/02/2009--eye examination revealed no evidence of diabetic retinopathy, ocular hypertension noted OD.   • Diabetic foot exam 8/3/2017    08/03/2017--routine diabetic foot exam reveals no evidence of diabetic foot ulcer or pre-ulcerative callus.  Distal pulses are very palpable and there is no signs of ischemia.  Sensation subjectively intact.   • Diverticulosis of colon 1/26/2015 01/26/2015--colonoscopy revealed extensive internal and external hemorrhoids. The patient had 2 sessile polyps, one at the splenic flexure and the other in the ascending colon near the hepatic flexure which were removed and sent for pathology. Left colon diverticulosis also noted. The ascending colon polyp returned tubular adenoma. The splenic flexure polyp was markedly cauterized and partially denuded. Focal hyperplastic features. No definite evidence of adenomatous change. Repeat study due 3 years.   • Elevated liver enzymes 8/3/2017    08/03/2017--routine physical.  AST elevated at 48.  ALT elevated at 61.  Suspect HEADLEY.  Hepatitis C antibody screening is negative.  We will repeat CMP in a few weeks and if the liver enzymes remain elevated  then we will proceed with a liver ultrasound.   • History of anemia 02/04/2015 12/06/2015--hemoglobin normal at 13.2, hematocrit normal at 39.5. Resolve this issue.   02/04/2015--homocystine and methylmalonic acid are normal. Serum iron low normal at 62. Iron saturation low at 16%. Colonoscopy ordered and returned one hyperplastic polyp and one tubular adenomatous polyp.   01/25/2015--routine CBC reveals a hemoglobin low at 13.1, hematocrit low at 40.3. Homocystine, methylma   • History of colon polyps, 12/18/2018--negative.  01/26/2015--tubular adenoma ×1.  Probable hyperplastic ×1.  Repeat due 3 years. 1/26/2015 12/18/2018--colonoscopy revealed multiple small and large mouth diverticula in the sigmoid and descending colon.  There was evidence of an impacted diverticulum.  No bleeding.  Nonbleeding internal hemorrhoids noted.  Grade 3.  No polyps noted.  No specimens taken.  01/26/2015--colonoscopy revealed extensive internal and external hemorrhoids. The patient had 2 sessile polyps, one at the splenic fl   • Hyperlipidemia 9/7/2007 09/07/2007--initial treatment hyperlipidemia.   • Hypothyroidism 6/18/2008 06/18/2008--initial diagnosis and treatment hypothyroidism.   • Multiple environmental allergies 5/17/2016    Patient had allergy testing as a child.   • Type 2 diabetes mellitus without complication, without long-term current use of insulin ([facility]) 7/17/2006 July 2006--initial diagnosis diabetes.   • Vitamin D deficiency 6/16/2016         Past Surgical History:   Procedure Laterality Date   • COLONOSCOPY  01/26/2015 01/26/2015--colonoscopy revealed extensive internal and external hemorrhoids. The patient had 2 sessile polyps, one at the splenic flexure and the other in the ascending colon near the hepatic flexure which were removed and sent for pathology. Left colon diverticulosis also noted. The ascending colon polyp returned tubular adenoma. The splenic flexure polyp was markedly cauterized  and partially de   • COLONOSCOPY N/A 12/18/2018 12/18/2018--colonoscopy revealed multiple small and large mouth diverticula in the sigmoid and descending colon.  There was evidence of an impacted diverticulum.  No bleeding.  Nonbleeding internal hemorrhoids noted.  Grade 3.  No polyps noted.  No specimens taken.         No Known Allergies        Current Outpatient Medications:   •  aspirin 81 MG EC tablet, Take 1 tablet by mouth daily., Disp: , Rfl:   •  Cholecalciferol (VITAMIN D) 2000 UNITS capsule, Take 1 capsule by mouth daily., Disp: , Rfl:   •  glyburide (DIAbeta) 5 MG tablet, TAKE 1 TABLET TWICE DAILY  WITH MEALS, Disp: 180 tablet, Rfl: 2  •  Janumet XR  MG tablet, TAKE 1 TABLET TWICE A DAY, Disp: 180 tablet, Rfl: 2  •  levothyroxine (Synthroid) 125 MCG tablet, TAKE 1 TABLET DAILY FOR    THYROID, Disp: 90 tablet, Rfl: 1  •  lisinopril (PRINIVIL,ZESTRIL) 10 MG tablet, TAKE 1 TABLET DAILY, Disp: 90 tablet, Rfl: 2  •  Semaglutide,0.25 or 0.5MG/DOS, (Ozempic, 0.25 or 0.5 MG/DOSE,) 2 MG/1.5ML solution pen-injector, Inject 0.25 mg subcutaneously once weekly x4 weeks and then increase to 0.5 mg subcutaneous weekly thereafter., Disp: 3 pen, Rfl: 3  •  simvastatin (ZOCOR) 40 MG tablet, TAKE 1 TABLET EVERY NIGHT, Disp: 90 tablet, Rfl: 2  •  tadalafil (Cialis) 5 MG tablet, Take 1 tablet by mouth Daily., Disp: 30 tablet, Rfl: 5  •  Chlorcyclizine-Pseudoephed 25-60 MG tablet, Take 1 p.o. twice daily for allergies and congestion, Disp: 180 tablet, Rfl: 3      Family History   Problem Relation Age of Onset   • Diabetes Mother         Type 2         Social History     Socioeconomic History   • Marital status:      Spouse name: Not on file   • Number of children: 0   • Years of education: Not on file   • Highest education level: Bachelor's degree (e.g., BA, AB, BS)   Tobacco Use   • Smoking status: Never Smoker   • Smokeless tobacco: Never Used   Vaping Use   • Vaping Use: Never used   Substance and Sexual  "Activity   • Alcohol use: No   • Drug use: No   • Sexual activity: Yes     Partners: Female         Vitals:    10/06/21 0734   BP: 108/60   Pulse: 70   Resp: 15   SpO2: 98%   Weight: 85.9 kg (189 lb 6.4 oz)   Height: 180.3 cm (70.98\")        Body mass index is 26.43 kg/m².      Physical Exam:    General: Alert and oriented x 3.  No acute distress.  Overweight.  Normal affect.  HEENT: Pupils equal, round, reactive to light; extraocular movements intact; sclerae nonicteric; pharynx, ear canals and TMs normal.  Neck: Without JVD, thyromegaly, bruit, or adenopathy.  Lungs: Clear to auscultation in all fields.  Heart: Regular rate and rhythm without murmur, rub, gallop, or click.  Abdomen: Soft, nontender, without hepatosplenomegaly or hernia.  Bowel sounds normal.  : Deferred.  Rectal: Deferred.  Extremities: Without clubbing, cyanosis, edema, or pulse deficit.  Neurologic: Intact without focal deficit.  Normal station and gait observed during ingress and egress from the examination room.  Skin: Without significant lesion.  Musculoskeletal: Unremarkable.    October 6, 2021--routine diabetic foot exam reveals no evidence of diabetic foot ulcer or preulcerative callus.  Distal pulses are palpable.  Sensation intact.    Lab/other results:    CBC reveals a hemoglobin low at 12.1.  Hematocrit low at 37.3.    Assessment/Plan:     Diagnosis Plan   1. Routine physical examination  PSA DIAGNOSTIC    T3, Free    T4, Free    TSH    Urinalysis With Microscopic If Indicated (No Culture) - Urine, Clean Catch    Vitamin D 25 Hydroxy    NMR LipoProfile    Microalbumin / Creatinine Urine Ratio - Urine, Clean Catch    Hemoglobin A1c    Comprehensive Metabolic Panel    CK   2. Type 2 diabetes mellitus without complication, without long-term current use of insulin (HCC)  Urinalysis With Microscopic If Indicated (No Culture) - Urine, Clean Catch    Microalbumin / Creatinine Urine Ratio - Urine, Clean Catch    Hemoglobin A1c    " Comprehensive Metabolic Panel    Comprehensive Metabolic Panel    Hemoglobin A1c   3. Hyperlipidemia  NMR LipoProfile    Comprehensive Metabolic Panel    CK    CK    NMR LipoProfile   4. Primary hypothyroidism  T3, Free    T4, Free    TSH    TSH    T4, Free    T3, Free   5. Benign essential hypertension     6. Elevated liver enzymes     7. Vitamin D deficiency  Vitamin D 25 Hydroxy    Vitamin D 25 Hydroxy   8. History of colon polyps, 12/18/2018--negative.  01/26/2015--tubular adenoma ×1.  Probable hyperplastic ×1.  Repeat due 3 years.     9. Multiple environmental allergies     10. Diabetic foot exam     11. Chronic anemia  CBC (No Diff)   12. Therapeutic drug monitoring  PSA DIAGNOSTIC   13. Need for influenza vaccination  Fluzone High-Dose 65+yrs ([id number])   14. Allergic rhinitis  Chlorcyclizine-Pseudoephed 25-60 MG tablet     Patient presents with essentially normal annual physical except for several issues that cannot be properly evaluated because the lab did not draw all of the ordered labs despite the orders clearly being in the chart.  He has type 2 diabetes that was not at goal at the last visit.  Hopefully will have improved.  He also has hyperlipidemia for which he takes simvastatin and this needs to be assessed with lab work as well.  He also has hypothyroidism and is on levothyroxine and his thyroid function test need to be done as well.  Also elevated liver enzymes need to be reassessed.  Patient has history of colon polyps and is up-to-date on his colonoscopy.  His environmental allergies currently not a big issue.  He has a chronic anemia and has been thoroughly evaluated in the past and is stable.    Several preventative health issues discussed including review of vaccinations and recommendations, including dietary issues, exercise and weight loss.  Safe sex practices discussed.  Patient advised to wear seatbelt whenever driving and avoid texting and driving.  Also advised to look both ways before  crossing the street.  Colon cancer prevention discussed and is up-to-date with colonoscopy.  Advised to avoid tobacco products and minimize alcohol consumption.    Plan is as follows: I released the lab work and patient will go to the lab and obtain that today.  He can follow-up on the phone for the results and possible further instructions.  We will schedule him for a follow-up with lab prior in 6 months.  Otherwise he will follow-up as needed.  Strongly recommend low carbohydrate diet and continued weight loss efforts.  Exercise would be helpful as well.  High-dose influenza vaccine given at patient request.  Prescription for Stahist given for his environmental allergies/chronic sinus drainage.    Procedures

## 2021-10-08 LAB
CHOLEST SERPL-MCNC: 122 MG/DL (ref 100–199)
HDL SERPL-SCNC: 32.4 UMOL/L
HDLC SERPL-MCNC: 47 MG/DL
LDL SERPL QN: 20.6 NM
LDL SERPL-SCNC: 654 NMOL/L
LDL SMALL SERPL-SCNC: 230 NMOL/L
LDLC SERPL CALC-MCNC: 50 MG/DL (ref 0–99)
TRIGL SERPL-MCNC: 147 MG/DL (ref 0–149)

## 2021-10-15 ENCOUNTER — TELEPHONE (OUTPATIENT)
Dept: INTERNAL MEDICINE | Facility: CLINIC | Age: 66
End: 2021-10-15

## 2021-10-15 DIAGNOSIS — J30.1 CHRONIC SEASONAL ALLERGIC RHINITIS DUE TO POLLEN: ICD-10-CM

## 2021-10-15 NOTE — TELEPHONE ENCOUNTER
Caller: Lionel Carson    Relationship: Self      Medication requested (name and dosage):    Chlorcyclizine-Pseudoephed 25-60 MG tablet         Pharmacy where request should be sent:   [facility] [address] AT [facility] & ([address] [address] - [phone]  - [phone] FX  [phone]  Additional details provided by patient: PATIENT IS CALLING TO REQUEST THE ABOVE PRESCRIPTION TO THE [facility] LISTED ABOVE.  HE STATES [facility] WILL NOT FILL.    Best call back number: [phone]    Does the patient have less than a 3 day supply:  [x] Yes  [] No    Melonie Barraza, Dennyed Rep   10/15/21 08:53 EDT

## 2022-01-04 RX ORDER — GLYBURIDE 5 MG/1
TABLET ORAL
Qty: 180 TABLET | Refills: 2 | Status: SHIPPED | OUTPATIENT
Start: 2022-01-04 | End: 2022-09-26

## 2022-01-04 RX ORDER — LISINOPRIL 10 MG/1
TABLET ORAL
Qty: 90 TABLET | Refills: 2 | Status: SHIPPED | OUTPATIENT
Start: 2022-01-04 | End: 2022-09-26

## 2022-01-04 RX ORDER — SIMVASTATIN 40 MG
TABLET ORAL
Qty: 90 TABLET | Refills: 2 | Status: SHIPPED | OUTPATIENT
Start: 2022-01-04 | End: 2022-09-26

## 2022-01-04 RX ORDER — SITAGLIPTIN AND METFORMIN HYDROCHLORIDE 1000; 50 MG/1; MG/1
TABLET, FILM COATED, EXTENDED RELEASE ORAL
Qty: 180 TABLET | Refills: 2 | Status: SHIPPED | OUTPATIENT
Start: 2022-01-04 | End: 2022-09-26

## 2022-01-19 DIAGNOSIS — E11.9 TYPE 2 DIABETES MELLITUS WITHOUT COMPLICATION, WITHOUT LONG-TERM CURRENT USE OF INSULIN: Chronic | ICD-10-CM

## 2022-01-20 RX ORDER — SEMAGLUTIDE 1.34 MG/ML
INJECTION, SOLUTION SUBCUTANEOUS
Qty: 3 PEN | Refills: 3 | Status: SHIPPED | OUTPATIENT
Start: 2022-01-20 | End: 2022-04-13 | Stop reason: SDUPTHER

## 2022-02-25 RX ORDER — TADALAFIL 5 MG/1
5 TABLET ORAL DAILY
Qty: 30 TABLET | Refills: 5 | Status: SHIPPED | OUTPATIENT
Start: 2022-02-25

## 2022-03-06 DIAGNOSIS — E03.9 HYPOTHYROIDISM, UNSPECIFIED TYPE: Chronic | ICD-10-CM

## 2022-03-07 RX ORDER — LEVOTHYROXINE SODIUM 0.12 MG/1
TABLET ORAL
Qty: 90 TABLET | Refills: 3 | Status: SHIPPED | OUTPATIENT
Start: 2022-03-07 | End: 2023-03-08

## 2022-03-30 ENCOUNTER — LAB (OUTPATIENT)
Dept: LAB | Facility: HOSPITAL | Age: 67
End: 2022-03-30

## 2022-03-30 DIAGNOSIS — E03.9 PRIMARY HYPOTHYROIDISM: Chronic | ICD-10-CM

## 2022-03-30 DIAGNOSIS — E55.9 VITAMIN D DEFICIENCY: Chronic | ICD-10-CM

## 2022-03-30 DIAGNOSIS — D64.9 CHRONIC ANEMIA: Chronic | ICD-10-CM

## 2022-03-30 DIAGNOSIS — E11.9 TYPE 2 DIABETES MELLITUS WITHOUT COMPLICATION, WITHOUT LONG-TERM CURRENT USE OF INSULIN: Chronic | ICD-10-CM

## 2022-03-30 DIAGNOSIS — E78.2 MIXED HYPERLIPIDEMIA: Chronic | ICD-10-CM

## 2022-03-30 LAB
25(OH)D3 SERPL-MCNC: 68.2 NG/ML (ref 30–100)
ALBUMIN SERPL-MCNC: 4.3 G/DL (ref 3.5–5.2)
ALBUMIN/GLOB SERPL: 1.7 G/DL
ALP SERPL-CCNC: 74 U/L (ref 39–117)
ALT SERPL W P-5'-P-CCNC: 16 U/L (ref 1–41)
ANION GAP SERPL CALCULATED.3IONS-SCNC: 10 MMOL/L (ref 5–15)
AST SERPL-CCNC: 14 U/L (ref 1–40)
BILIRUB SERPL-MCNC: 0.6 MG/DL (ref 0–1.2)
BUN SERPL-MCNC: 14 MG/DL (ref 8–23)
BUN/CREAT SERPL: 10.1 (ref 7–25)
CALCIUM SPEC-SCNC: 8.9 MG/DL (ref 8.6–10.5)
CHLORIDE SERPL-SCNC: 106 MMOL/L (ref 98–107)
CK SERPL-CCNC: 82 U/L (ref 20–200)
CO2 SERPL-SCNC: 24 MMOL/L (ref 22–29)
CREAT SERPL-MCNC: 1.38 MG/DL (ref 0.76–1.27)
DEPRECATED RDW RBC AUTO: 42.4 FL (ref 37–54)
EGFRCR SERPLBLD CKD-EPI 2021: 57.1 ML/MIN/1.73
ERYTHROCYTE [DISTWIDTH] IN BLOOD BY AUTOMATED COUNT: 12.9 % (ref 12.3–15.4)
GLOBULIN UR ELPH-MCNC: 2.6 GM/DL
GLUCOSE SERPL-MCNC: 128 MG/DL (ref 65–99)
HBA1C MFR BLD: 7.9 % (ref 4.8–5.6)
HCT VFR BLD AUTO: 38.5 % (ref 37.5–51)
HGB BLD-MCNC: 12.4 G/DL (ref 13–17.7)
MCH RBC QN AUTO: 29.4 PG (ref 26.6–33)
MCHC RBC AUTO-ENTMCNC: 32.2 G/DL (ref 31.5–35.7)
MCV RBC AUTO: 91.2 FL (ref 79–97)
PLATELET # BLD AUTO: 157 10*3/MM3 (ref 140–450)
PMV BLD AUTO: 11.2 FL (ref 6–12)
POTASSIUM SERPL-SCNC: 4.5 MMOL/L (ref 3.5–5.2)
PROT SERPL-MCNC: 6.9 G/DL (ref 6–8.5)
RBC # BLD AUTO: 4.22 10*6/MM3 (ref 4.14–5.8)
SODIUM SERPL-SCNC: 140 MMOL/L (ref 136–145)
T3FREE SERPL-MCNC: 2.67 PG/ML (ref 2–4.4)
T4 FREE SERPL-MCNC: 1.7 NG/DL (ref 0.93–1.7)
TSH SERPL DL<=0.05 MIU/L-ACNC: 2.19 UIU/ML (ref 0.27–4.2)
WBC NRBC COR # BLD: 5.29 10*3/MM3 (ref 3.4–10.8)

## 2022-03-30 PROCEDURE — 83036 HEMOGLOBIN GLYCOSYLATED A1C: CPT

## 2022-03-30 PROCEDURE — 80050 GENERAL HEALTH PANEL: CPT

## 2022-03-30 PROCEDURE — 83704 LIPOPROTEIN BLD QUAN PART: CPT

## 2022-03-30 PROCEDURE — 82306 VITAMIN D 25 HYDROXY: CPT

## 2022-03-30 PROCEDURE — 36415 COLL VENOUS BLD VENIPUNCTURE: CPT

## 2022-03-30 PROCEDURE — 84439 ASSAY OF FREE THYROXINE: CPT

## 2022-03-30 PROCEDURE — 84481 FREE ASSAY (FT-3): CPT

## 2022-03-30 PROCEDURE — 80061 LIPID PANEL: CPT

## 2022-03-30 PROCEDURE — 82550 ASSAY OF CK (CPK): CPT

## 2022-04-01 LAB
CHOLEST SERPL-MCNC: 89 MG/DL (ref 100–199)
HDL SERPL-SCNC: 31.1 UMOL/L
HDLC SERPL-MCNC: 42 MG/DL
LDL SERPL QN: 20.2 NM
LDL SERPL-SCNC: 474 NMOL/L
LDL SMALL SERPL-SCNC: 270 NMOL/L
LDLC SERPL CALC-MCNC: 31 MG/DL (ref 0–99)
TRIGL SERPL-MCNC: 74 MG/DL (ref 0–149)

## 2022-04-06 ENCOUNTER — OFFICE VISIT (OUTPATIENT)
Dept: INTERNAL MEDICINE | Facility: CLINIC | Age: 67
End: 2022-04-06

## 2022-04-06 VITALS
DIASTOLIC BLOOD PRESSURE: 62 MMHG | OXYGEN SATURATION: 100 % | RESPIRATION RATE: 16 BRPM | HEART RATE: 73 BPM | BODY MASS INDEX: 28.18 KG/M2 | SYSTOLIC BLOOD PRESSURE: 118 MMHG | WEIGHT: 196.8 LBS | HEIGHT: 70 IN

## 2022-04-06 DIAGNOSIS — E11.9 TYPE 2 DIABETES MELLITUS WITHOUT COMPLICATION, WITHOUT LONG-TERM CURRENT USE OF INSULIN: Primary | Chronic | ICD-10-CM

## 2022-04-06 DIAGNOSIS — E78.2 MIXED HYPERLIPIDEMIA: Chronic | ICD-10-CM

## 2022-04-06 DIAGNOSIS — E66.3 OVERWEIGHT (BMI 25.0-29.9): Chronic | ICD-10-CM

## 2022-04-06 DIAGNOSIS — Z91.09 MULTIPLE ENVIRONMENTAL ALLERGIES: Chronic | ICD-10-CM

## 2022-04-06 DIAGNOSIS — E55.9 VITAMIN D DEFICIENCY: Chronic | ICD-10-CM

## 2022-04-06 DIAGNOSIS — E03.9 PRIMARY HYPOTHYROIDISM: Chronic | ICD-10-CM

## 2022-04-06 DIAGNOSIS — I10 BENIGN ESSENTIAL HYPERTENSION: Chronic | ICD-10-CM

## 2022-04-06 DIAGNOSIS — D64.9 CHRONIC ANEMIA: Chronic | ICD-10-CM

## 2022-04-06 DIAGNOSIS — R74.8 ELEVATED LIVER ENZYMES: Chronic | ICD-10-CM

## 2022-04-06 DIAGNOSIS — Z51.81 THERAPEUTIC DRUG MONITORING: ICD-10-CM

## 2022-04-06 DIAGNOSIS — Z00.00 ROUTINE PHYSICAL EXAMINATION: ICD-10-CM

## 2022-04-06 DIAGNOSIS — Z86.010 HISTORY OF COLON POLYPS: Chronic | ICD-10-CM

## 2022-04-06 PROCEDURE — 99214 OFFICE O/P EST MOD 30 MIN: CPT | Performed by: INTERNAL MEDICINE

## 2022-04-06 NOTE — PROGRESS NOTES
04/06/2022    Patient Information  Lionel Carson                                                                                          [address]  [address]  [facility] [address]      1955  [unfilled]  There is no work phone number on file.    Chief Complaint:     Follow-up blood work in order to monitor chronic medical issues listed in history of present illness.  No new acute complaints.    History of Present Illness:    Patient with type 2 diabetes, hyperlipidemia, hypothyroidism, hypertension, history of colon polyps, environmental allergies, vitamin D deficiency, chronic anemia and elevated liver enzymes.  He presents today for follow-up with lab prior in order to monitor his chronic medical issues.  Past medical history reviewed and updated were necessary including health maintenance parameters.  This reveals he is currently up-to-date.    Review of Systems   Constitutional: Negative. Negative for weight loss.   HENT: Negative.    Eyes: Negative.  Negative for blurred vision.   Cardiovascular: Negative.  Negative for chest pain.   Respiratory: Negative.    Endocrine: Negative.  Negative for polydipsia, polyphagia and polyuria.   Hematologic/Lymphatic: Negative.    Skin: Negative.    Musculoskeletal: Negative.    Gastrointestinal: Negative.    Genitourinary: Negative.    Neurological: Negative.  Negative for dizziness, headaches, seizures, tremors and weakness.   Psychiatric/Behavioral: Negative.  The patient is not nervous/anxious.    Allergic/Immunologic: Negative.        Active Problems:    Patient Active Problem List   Diagnosis   • Allergic rhinitis   • Benign essential hypertension   • History of colon polyps, 12/18/2018--negative.  01/26/2015--tubular adenoma ×1.  Probable hyperplastic ×1.  Repeat due 3 years.   • Diverticulosis of colon   • Hyperlipidemia   • Primary hypothyroidism   • Multiple environmental allergies   • Type 2 diabetes mellitus without complication, without  long-term current use of insulin (HCC)   • Vitamin D deficiency   • Therapeutic drug monitoring   • Routine physical examination   • Diabetic foot exam   • Diabetic eye exam (HCC)   • Chronic anemia   • Elevated liver enzymes   • Nuclear cataract   • Overweight (BMI 25.0-29.9)         Past Medical History:   Diagnosis Date   • Benign essential hypertension 9/7/2007 09/07/2007--initial treatment with lisinopril that was primarily for renal protection.   • Chronic anemia 8/3/2017    08/03/2017--routine physical.  Hemoglobin is low at 13.3.  Hematocrit low at 40.0.  Indices are normal.  Repeat CBC, iron studies, homocystine, and methylmalonic acid ordered.  Patient will follow-up on the phone for the results and possible further instructions.  He is aware that it takes 2 weeks for one lab test to return.   • Diabetic eye exam (HCC) 3/17/2017    03/17/2017--routine diabetic eye exam reveals no evidence of diabetic retinopathy.  Cataracts both eyes.  Observation recommended.  07/09/2014--routine ophthalmologic examination reveals no evidence of diabetic retinopathy.     02/02/2009--eye examination revealed no evidence of diabetic retinopathy, ocular hypertension noted OD.   • Diabetic foot exam 8/3/2017    08/03/2017--routine diabetic foot exam reveals no evidence of diabetic foot ulcer or pre-ulcerative callus.  Distal pulses are very palpable and there is no signs of ischemia.  Sensation subjectively intact.   • Diverticulosis of colon 1/26/2015 01/26/2015--colonoscopy revealed extensive internal and external hemorrhoids. The patient had 2 sessile polyps, one at the splenic flexure and the other in the ascending colon near the hepatic flexure which were removed and sent for pathology. Left colon diverticulosis also noted. The ascending colon polyp returned tubular adenoma. The splenic flexure polyp was markedly cauterized and partially denuded. Focal hyperplastic features. No definite evidence of adenomatous  change. Repeat study due 3 years.   • Elevated liver enzymes 8/3/2017    08/03/2017--routine physical.  AST elevated at 48.  ALT elevated at 61.  Suspect HEADLEY.  Hepatitis C antibody screening is negative.  We will repeat CMP in a few weeks and if the liver enzymes remain elevated then we will proceed with a liver ultrasound.   • History of anemia 02/04/2015 12/06/2015--hemoglobin normal at 13.2, hematocrit normal at 39.5. Resolve this issue.   02/04/2015--homocystine and methylmalonic acid are normal. Serum iron low normal at 62. Iron saturation low at 16%. Colonoscopy ordered and returned one hyperplastic polyp and one tubular adenomatous polyp.   01/25/2015--routine CBC reveals a hemoglobin low at 13.1, hematocrit low at 40.3. Homocystine, methylma   • History of colon polyps, 12/18/2018--negative.  01/26/2015--tubular adenoma ×1.  Probable hyperplastic ×1.  Repeat due 3 years. 1/26/2015 12/18/2018--colonoscopy revealed multiple small and large mouth diverticula in the sigmoid and descending colon.  There was evidence of an impacted diverticulum.  No bleeding.  Nonbleeding internal hemorrhoids noted.  Grade 3.  No polyps noted.  No specimens taken.  01/26/2015--colonoscopy revealed extensive internal and external hemorrhoids. The patient had 2 sessile polyps, one at the splenic fl   • Hyperlipidemia 9/7/2007 09/07/2007--initial treatment hyperlipidemia.   • Hypothyroidism 6/18/2008 06/18/2008--initial diagnosis and treatment hypothyroidism.   • Multiple environmental allergies 5/17/2016    Patient had allergy testing as a child.   • Type 2 diabetes mellitus without complication, without long-term current use of insulin ([facility]) 7/17/2006 July 2006--initial diagnosis diabetes.   • Vitamin D deficiency 6/16/2016         Past Surgical History:   Procedure Laterality Date   • COLONOSCOPY  01/26/2015 01/26/2015--colonoscopy revealed extensive internal and external hemorrhoids. The patient had 2 sessile  polyps, one at the splenic flexure and the other in the ascending colon near the hepatic flexure which were removed and sent for pathology. Left colon diverticulosis also noted. The ascending colon polyp returned tubular adenoma. The splenic flexure polyp was markedly cauterized and partially de   • COLONOSCOPY N/A 12/18/2018 12/18/2018--colonoscopy revealed multiple small and large mouth diverticula in the sigmoid and descending colon.  There was evidence of an impacted diverticulum.  No bleeding.  Nonbleeding internal hemorrhoids noted.  Grade 3.  No polyps noted.  No specimens taken.         No Known Allergies        Current Outpatient Medications:   •  aspirin 81 MG EC tablet, Take 1 tablet by mouth daily., Disp: , Rfl:   •  Chlorcyclizine-Pseudoephed 25-60 MG tablet, Take 1 p.o. twice daily for allergies and congestion, Disp: 180 tablet, Rfl: 3  •  Cholecalciferol (VITAMIN D) 2000 UNITS capsule, Take 1 capsule by mouth daily., Disp: , Rfl:   •  glyburide (DIAbeta) 5 MG tablet, TAKE 1 TABLET TWICE DAILY  WITH MEALS, Disp: 180 tablet, Rfl: 2  •  Janumet XR  MG tablet, TAKE 1 TABLET TWICE A DAY, Disp: 180 tablet, Rfl: 2  •  levothyroxine (Synthroid) 125 MCG tablet, TAKE 1 TABLET DAILY FOR    THYROID, Disp: 90 tablet, Rfl: 3  •  lisinopril (PRINIVIL,ZESTRIL) 10 MG tablet, TAKE 1 TABLET DAILY, Disp: 90 tablet, Rfl: 2  •  Semaglutide,0.25 or 0.5MG/DOS, (Ozempic, 0.25 or 0.5 MG/DOSE,) 2 MG/1.5ML solution pen-injector, Inject 0.25 mg subcutaneously once weekly x4 weeks and then increase to 0.5 mg subcutaneous weekly thereafter., Disp: 3 pen, Rfl: 3  •  simvastatin (ZOCOR) 40 MG tablet, TAKE 1 TABLET EVERY NIGHT, Disp: 90 tablet, Rfl: 2  •  tadalafil (Cialis) 5 MG tablet, Take 1 tablet by mouth Daily., Disp: 30 tablet, Rfl: 5      Family History   Problem Relation Age of Onset   • Diabetes Mother         Type 2         Social History     Socioeconomic History   • Marital status:    • Number of  "children: 0   • Highest education level: Bachelor's degree (e.g., BA, AB, BS)   Tobacco Use   • Smoking status: Never Smoker   • Smokeless tobacco: Never Used   Vaping Use   • Vaping Use: Never used   Substance and Sexual Activity   • Alcohol use: No   • Drug use: No   • Sexual activity: Yes     Partners: Female         Vitals:    04/06/22 0733   BP: 118/62   BP Location: Right arm   Patient Position: Sitting   Cuff Size: Adult   Pulse: 73   Resp: 16   SpO2: 100%   Weight: 89.3 kg (196 lb 12.8 oz)   Height: 177.8 cm (70\")        Body mass index is 28.24 kg/m².      Physical Exam:    General: Alert and oriented x 3.  No acute distress.  Overweight.  Normal affect.  HEENT: Pupils equal, round, reactive to light; extraocular movements intact; sclerae nonicteric; pharynx, ear canals and TMs normal.  Neck: Without JVD, thyromegaly, bruit, or adenopathy.  Lungs: Clear to auscultation in all fields.  Heart: Regular rate and rhythm without murmur, rub, gallop, or click.  Abdomen: Soft, nontender, without hepatosplenomegaly or hernia.  Bowel sounds normal.  : Deferred.  Rectal: Deferred.  Extremities: Without clubbing, cyanosis, edema, or pulse deficit.  Neurologic: Intact without focal deficit.  Normal station and gait observed during ingress and egress from the examination room.  Skin: Without significant lesion.  Musculoskeletal: Unremarkable.    Lab/other results:    CMP is normal except glucose 128, creatinine 1.38.  Estimated GFR 57.1.  CBC is normal except hemoglobin low at 12.4 but hematocrit is normal at 38.5.  Hemoglobin A1c 7.9.  NMR reveals a total cholesterol of 89.  Triglycerides 74.  LDL particle number excellent 474.  Small LDL particle number excellent 270.  HDL particle number normal at 31.1.  CPK normal.  Thyroid function test normal.  Vitamin D normal at 68.2.    Assessment/Plan:     Diagnosis Plan   1. Type 2 diabetes mellitus without complication, without long-term current use of insulin (HCC)  " Hemoglobin A1c    Microalbumin / Creatinine Urine Ratio - Urine, Clean Catch    Urinalysis With Microscopic If Indicated (No Culture) - Urine, Clean Catch   2. Hyperlipidemia  CK    Comprehensive Metabolic Panel    NMR LipoProfile    TSH    T4, Free    T3, Free   3. Primary hypothyroidism     4. Benign essential hypertension     5. History of colon polyps, 12/18/2018--negative.  01/26/2015--tubular adenoma ×1.  Probable hyperplastic ×1.  Repeat due 3 years.     6. Multiple environmental allergies     7. Vitamin D deficiency  Vitamin D 25 Hydroxy   8. Chronic anemia  CBC (No Diff)   9. Elevated liver enzymes     10. Therapeutic drug monitoring  PSA DIAGNOSTIC   11. Overweight (BMI 25.0-29.9)     12. Routine physical examination  CBC (No Diff)    CK    Comprehensive Metabolic Panel    Hemoglobin A1c    NMR LipoProfile    Microalbumin / Creatinine Urine Ratio - Urine, Clean Catch    Vitamin D 25 Hydroxy    Urinalysis With Microscopic If Indicated (No Culture) - Urine, Clean Catch    TSH    T4, Free    T3, Free    PSA DIAGNOSTIC     Patient has type 2 diabetes is only under fair control.  A1c has been better previously.  Patient is overweight strongly recommended low carbohydrate diet, exercise, and weight loss.  His weight is up from the last visit which may be playing a role.  Patient has hyperlipidemia and his cholesterol is close to being too low.  I reviewed the last NMR and his total cholesterol was 122.  Is been no medication changes so this is not really explainable other than possibly lab error.  His thyroid is therapeutic on the current dose of levothyroxine.  Blood pressure is well controlled.  Patient has history of colon polyps and is up-to-date on his colonoscopy.  His environmental allergies treated with Stahist and seems to be controlled.  Vitamin D is in the normal range.  Chronic anemia is mild and stable.  Elevated liver enzymes have resolved.    Plan is as follows: Strongly recommend low carbohydrate  diet, exercise, and weight loss.  Patient will follow-up after October 7, 2022 with lab prior and this will be his annual physical unless he goes on Medicare.        Procedures

## 2022-04-13 DIAGNOSIS — E11.9 TYPE 2 DIABETES MELLITUS WITHOUT COMPLICATION, WITHOUT LONG-TERM CURRENT USE OF INSULIN: Chronic | ICD-10-CM

## 2022-04-13 RX ORDER — SEMAGLUTIDE 1.34 MG/ML
INJECTION, SOLUTION SUBCUTANEOUS
Qty: 3 PEN | Refills: 3 | Status: SHIPPED | OUTPATIENT
Start: 2022-04-13 | End: 2023-03-03

## 2022-09-26 RX ORDER — SITAGLIPTIN AND METFORMIN HYDROCHLORIDE 1000; 50 MG/1; MG/1
TABLET, FILM COATED, EXTENDED RELEASE ORAL
Qty: 180 TABLET | Refills: 2 | Status: SHIPPED | OUTPATIENT
Start: 2022-09-26

## 2022-09-26 RX ORDER — LISINOPRIL 10 MG/1
TABLET ORAL
Qty: 90 TABLET | Refills: 2 | Status: SHIPPED | OUTPATIENT
Start: 2022-09-26

## 2022-09-26 RX ORDER — GLYBURIDE 5 MG/1
TABLET ORAL
Qty: 180 TABLET | Refills: 2 | Status: SHIPPED | OUTPATIENT
Start: 2022-09-26

## 2022-09-26 RX ORDER — SIMVASTATIN 40 MG
TABLET ORAL
Qty: 90 TABLET | Refills: 2 | Status: SHIPPED | OUTPATIENT
Start: 2022-09-26

## 2022-10-05 ENCOUNTER — LAB (OUTPATIENT)
Dept: LAB | Facility: HOSPITAL | Age: 67
End: 2022-10-05

## 2022-10-05 DIAGNOSIS — D64.9 CHRONIC ANEMIA: Chronic | ICD-10-CM

## 2022-10-05 DIAGNOSIS — E11.9 TYPE 2 DIABETES MELLITUS WITHOUT COMPLICATION, WITHOUT LONG-TERM CURRENT USE OF INSULIN: Chronic | ICD-10-CM

## 2022-10-05 DIAGNOSIS — E55.9 VITAMIN D DEFICIENCY: Chronic | ICD-10-CM

## 2022-10-05 DIAGNOSIS — Z00.00 ROUTINE PHYSICAL EXAMINATION: ICD-10-CM

## 2022-10-05 DIAGNOSIS — E78.2 MIXED HYPERLIPIDEMIA: Chronic | ICD-10-CM

## 2022-10-05 DIAGNOSIS — Z51.81 THERAPEUTIC DRUG MONITORING: ICD-10-CM

## 2022-10-05 LAB
25(OH)D3 SERPL-MCNC: 71.1 NG/ML (ref 30–100)
ALBUMIN SERPL-MCNC: 3.7 G/DL (ref 3.5–5.2)
ALBUMIN UR-MCNC: <1.2 MG/DL
ALBUMIN/GLOB SERPL: 1.3 G/DL
ALP SERPL-CCNC: 63 U/L (ref 39–117)
ALT SERPL W P-5'-P-CCNC: 20 U/L (ref 1–41)
ANION GAP SERPL CALCULATED.3IONS-SCNC: 10.4 MMOL/L (ref 5–15)
AST SERPL-CCNC: 18 U/L (ref 1–40)
BILIRUB SERPL-MCNC: 0.7 MG/DL (ref 0–1.2)
BILIRUB UR QL STRIP: NEGATIVE
BUN SERPL-MCNC: 15 MG/DL (ref 8–23)
BUN/CREAT SERPL: 12.6 (ref 7–25)
CALCIUM SPEC-SCNC: 9.1 MG/DL (ref 8.6–10.5)
CHLORIDE SERPL-SCNC: 107 MMOL/L (ref 98–107)
CK SERPL-CCNC: 81 U/L (ref 20–200)
CLARITY UR: CLEAR
CO2 SERPL-SCNC: 23.6 MMOL/L (ref 22–29)
COLOR UR: YELLOW
CREAT SERPL-MCNC: 1.19 MG/DL (ref 0.76–1.27)
CREAT UR-MCNC: 138 MG/DL
DEPRECATED RDW RBC AUTO: 41.3 FL (ref 37–54)
EGFRCR SERPLBLD CKD-EPI 2021: 67.8 ML/MIN/1.73
ERYTHROCYTE [DISTWIDTH] IN BLOOD BY AUTOMATED COUNT: 12.7 % (ref 12.3–15.4)
GLOBULIN UR ELPH-MCNC: 2.8 GM/DL
GLUCOSE SERPL-MCNC: 89 MG/DL (ref 65–99)
GLUCOSE UR STRIP-MCNC: NEGATIVE MG/DL
HBA1C MFR BLD: 7.3 % (ref 4.8–5.6)
HCT VFR BLD AUTO: 37.3 % (ref 37.5–51)
HGB BLD-MCNC: 12.4 G/DL (ref 13–17.7)
HGB UR QL STRIP.AUTO: NEGATIVE
KETONES UR QL STRIP: NEGATIVE
LEUKOCYTE ESTERASE UR QL STRIP.AUTO: NEGATIVE
MCH RBC QN AUTO: 29.6 PG (ref 26.6–33)
MCHC RBC AUTO-ENTMCNC: 33.2 G/DL (ref 31.5–35.7)
MCV RBC AUTO: 89 FL (ref 79–97)
MICROALBUMIN/CREAT UR: NORMAL MG/G{CREAT}
NITRITE UR QL STRIP: NEGATIVE
PH UR STRIP.AUTO: 5.5 [PH] (ref 5–8)
PLATELET # BLD AUTO: 141 10*3/MM3 (ref 140–450)
PMV BLD AUTO: 11.1 FL (ref 6–12)
POTASSIUM SERPL-SCNC: 4 MMOL/L (ref 3.5–5.2)
PROT SERPL-MCNC: 6.5 G/DL (ref 6–8.5)
PROT UR QL STRIP: NEGATIVE
PSA SERPL-MCNC: 0.2 NG/ML (ref 0–4)
RBC # BLD AUTO: 4.19 10*6/MM3 (ref 4.14–5.8)
SODIUM SERPL-SCNC: 141 MMOL/L (ref 136–145)
SP GR UR STRIP: 1.02 (ref 1–1.03)
T3FREE SERPL-MCNC: 3.54 PG/ML (ref 2–4.4)
T4 FREE SERPL-MCNC: 1.86 NG/DL (ref 0.93–1.7)
TSH SERPL DL<=0.05 MIU/L-ACNC: 0.12 UIU/ML (ref 0.27–4.2)
UROBILINOGEN UR QL STRIP: NORMAL
WBC NRBC COR # BLD: 6.54 10*3/MM3 (ref 3.4–10.8)

## 2022-10-05 PROCEDURE — 80050 GENERAL HEALTH PANEL: CPT | Performed by: INTERNAL MEDICINE

## 2022-10-05 PROCEDURE — 84153 ASSAY OF PSA TOTAL: CPT | Performed by: INTERNAL MEDICINE

## 2022-10-05 PROCEDURE — 82570 ASSAY OF URINE CREATININE: CPT | Performed by: INTERNAL MEDICINE

## 2022-10-05 PROCEDURE — 84439 ASSAY OF FREE THYROXINE: CPT | Performed by: INTERNAL MEDICINE

## 2022-10-05 PROCEDURE — 82550 ASSAY OF CK (CPK): CPT | Performed by: INTERNAL MEDICINE

## 2022-10-05 PROCEDURE — 82306 VITAMIN D 25 HYDROXY: CPT | Performed by: INTERNAL MEDICINE

## 2022-10-05 PROCEDURE — 36415 COLL VENOUS BLD VENIPUNCTURE: CPT

## 2022-10-05 PROCEDURE — 82043 UR ALBUMIN QUANTITATIVE: CPT | Performed by: INTERNAL MEDICINE

## 2022-10-05 PROCEDURE — 84481 FREE ASSAY (FT-3): CPT | Performed by: INTERNAL MEDICINE

## 2022-10-05 PROCEDURE — 83704 LIPOPROTEIN BLD QUAN PART: CPT | Performed by: INTERNAL MEDICINE

## 2022-10-05 PROCEDURE — 83036 HEMOGLOBIN GLYCOSYLATED A1C: CPT | Performed by: INTERNAL MEDICINE

## 2022-10-05 PROCEDURE — 81003 URINALYSIS AUTO W/O SCOPE: CPT | Performed by: INTERNAL MEDICINE

## 2022-10-05 PROCEDURE — 80061 LIPID PANEL: CPT | Performed by: INTERNAL MEDICINE

## 2022-10-06 LAB
CHOLEST SERPL-MCNC: 117 MG/DL (ref 100–199)
HDL SERPL-SCNC: 30 UMOL/L
HDLC SERPL-MCNC: 41 MG/DL
LDL SERPL QN: 20.5 NM
LDL SERPL-SCNC: 556 NMOL/L
LDL SMALL SERPL-SCNC: 238 NMOL/L
LDLC SERPL CALC-MCNC: 48 MG/DL (ref 0–99)
TRIGL SERPL-MCNC: 164 MG/DL (ref 0–149)

## 2022-10-12 ENCOUNTER — OFFICE VISIT (OUTPATIENT)
Dept: INTERNAL MEDICINE | Facility: CLINIC | Age: 67
End: 2022-10-12

## 2022-10-12 VITALS
WEIGHT: 193.6 LBS | DIASTOLIC BLOOD PRESSURE: 64 MMHG | HEIGHT: 70 IN | BODY MASS INDEX: 27.72 KG/M2 | SYSTOLIC BLOOD PRESSURE: 110 MMHG | OXYGEN SATURATION: 99 % | RESPIRATION RATE: 18 BRPM | HEART RATE: 89 BPM

## 2022-10-12 DIAGNOSIS — Z51.81 THERAPEUTIC DRUG MONITORING: ICD-10-CM

## 2022-10-12 DIAGNOSIS — Z91.09 MULTIPLE ENVIRONMENTAL ALLERGIES: Chronic | ICD-10-CM

## 2022-10-12 DIAGNOSIS — E66.3 OVERWEIGHT (BMI 25.0-29.9): Chronic | ICD-10-CM

## 2022-10-12 DIAGNOSIS — I10 BENIGN ESSENTIAL HYPERTENSION: Chronic | ICD-10-CM

## 2022-10-12 DIAGNOSIS — Z86.010 HISTORY OF COLON POLYPS: Chronic | ICD-10-CM

## 2022-10-12 DIAGNOSIS — E11.9 TYPE 2 DIABETES MELLITUS WITHOUT COMPLICATION, WITHOUT LONG-TERM CURRENT USE OF INSULIN: Chronic | ICD-10-CM

## 2022-10-12 DIAGNOSIS — E03.9 PRIMARY HYPOTHYROIDISM: Chronic | ICD-10-CM

## 2022-10-12 DIAGNOSIS — E11.9 ENCOUNTER FOR DIABETIC FOOT EXAM: Chronic | ICD-10-CM

## 2022-10-12 DIAGNOSIS — Z23 NEED FOR PNEUMOCOCCAL VACCINATION: ICD-10-CM

## 2022-10-12 DIAGNOSIS — Z23 NEED FOR INFLUENZA VACCINATION: ICD-10-CM

## 2022-10-12 DIAGNOSIS — Z00.00 ROUTINE PHYSICAL EXAMINATION: Primary | ICD-10-CM

## 2022-10-12 DIAGNOSIS — E55.9 VITAMIN D DEFICIENCY: Chronic | ICD-10-CM

## 2022-10-12 DIAGNOSIS — D64.9 CHRONIC ANEMIA: Chronic | ICD-10-CM

## 2022-10-12 DIAGNOSIS — E78.2 MIXED HYPERLIPIDEMIA: Chronic | ICD-10-CM

## 2022-10-12 PROCEDURE — 90677 PCV20 VACCINE IM: CPT | Performed by: INTERNAL MEDICINE

## 2022-10-12 PROCEDURE — 90471 IMMUNIZATION ADMIN: CPT | Performed by: INTERNAL MEDICINE

## 2022-10-12 PROCEDURE — 90662 IIV NO PRSV INCREASED AG IM: CPT | Performed by: INTERNAL MEDICINE

## 2022-10-12 PROCEDURE — 99397 PER PM REEVAL EST PAT 65+ YR: CPT | Performed by: INTERNAL MEDICINE

## 2022-10-12 PROCEDURE — 90472 IMMUNIZATION ADMIN EACH ADD: CPT | Performed by: INTERNAL MEDICINE

## 2022-10-12 NOTE — PROGRESS NOTES
10/12/2022    Patient Information  Lionel Carson                                                                                          [address]  [address]  [facility] [address]      1955  [unfilled]  There is no work phone number on file.    Chief Complaint:     Routine annual physical examination and follow-up blood work.  No new acute complaints.    History of Present Illness:    Patient with type 2 diabetes, hyperlipidemia, hypothyroidism, hypertension, vitamin D deficiency, chronic anemia, history of colon polyps, environmental allergies, overweight.  He presents today for his routine annual physical exam and follow-up blood work.  His past medical history reviewed and updated were necessary including health maintenance parameters.  This reveals he needs pneumococcal and influenza vaccine.  See below.    Review of Systems   Constitutional: Negative.   HENT: Negative.    Eyes: Negative.    Cardiovascular: Negative.    Respiratory: Negative.    Endocrine: Negative.    Hematologic/Lymphatic: Negative.    Skin: Negative.    Musculoskeletal: Negative.    Gastrointestinal: Negative.    Genitourinary: Negative.    Neurological: Negative.    Psychiatric/Behavioral: Negative.    Allergic/Immunologic: Negative.        Active Problems:    Patient Active Problem List   Diagnosis   • Allergic rhinitis   • Benign essential hypertension   • History of colon polyps, 12/18/2018--negative.  01/26/2015--tubular adenoma ×1.  Probable hyperplastic ×1.  Repeat due 3 years.   • Diverticulosis of colon   • Hyperlipidemia   • Primary hypothyroidism   • Multiple environmental allergies   • Type 2 diabetes mellitus without complication, without long-term current use of insulin (HCC)   • Vitamin D deficiency   • Therapeutic drug monitoring   • Routine physical examination   • Diabetic foot exam   • Diabetic eye exam (HCC)   • Chronic anemia   • Nuclear cataract   • Overweight (BMI 25.0-29.9)         Past Medical  History:   Diagnosis Date   • Benign essential hypertension 09/07/2007 09/07/2007--initial treatment with lisinopril that was primarily for renal protection.   • Chronic anemia 08/03/2017 08/03/2017--routine physical.  Hemoglobin is low at 13.3.  Hematocrit low at 40.0.  Indices are normal.  Repeat CBC, iron studies, homocystine, and methylmalonic acid ordered.  Patient will follow-up on the phone for the results and possible further instructions.  He is aware that it takes 2 weeks for one lab test to return.   • Diabetic eye exam (HCC) 03/17/2017 03/17/2017--routine diabetic eye exam reveals no evidence of diabetic retinopathy.  Cataracts both eyes.  Observation recommended.  07/09/2014--routine ophthalmologic examination reveals no evidence of diabetic retinopathy.     02/02/2009--eye examination revealed no evidence of diabetic retinopathy, ocular hypertension noted OD.   • Diabetic foot exam 08/03/2017 08/03/2017--routine diabetic foot exam reveals no evidence of diabetic foot ulcer or pre-ulcerative callus.  Distal pulses are very palpable and there is no signs of ischemia.  Sensation subjectively intact.   • Diverticulosis of colon 01/26/2015 01/26/2015--colonoscopy revealed extensive internal and external hemorrhoids. The patient had 2 sessile polyps, one at the splenic flexure and the other in the ascending colon near the hepatic flexure which were removed and sent for pathology. Left colon diverticulosis also noted. The ascending colon polyp returned tubular adenoma. The splenic flexure polyp was markedly cauterized and partially denuded. Focal hyperplastic features. No definite evidence of adenomatous change. Repeat study due 3 years.   • History of anemia 02/04/2015 12/06/2015--hemoglobin normal at 13.2, hematocrit normal at 39.5. Resolve this issue.   02/04/2015--homocystine and methylmalonic acid are normal. Serum iron low normal at 62. Iron saturation low at 16%. Colonoscopy ordered  and returned one hyperplastic polyp and one tubular adenomatous polyp.   01/25/2015--routine CBC reveals a hemoglobin low at 13.1, hematocrit low at 40.3. Homocystine, methylma   • History of colon polyps, 12/18/2018--negative.  01/26/2015--tubular adenoma ×1.  Probable hyperplastic ×1.  Repeat due 3 years. 01/26/2015 12/18/2018--colonoscopy revealed multiple small and large mouth diverticula in the sigmoid and descending colon.  There was evidence of an impacted diverticulum.  No bleeding.  Nonbleeding internal hemorrhoids noted.  Grade 3.  No polyps noted.  No specimens taken.  01/26/2015--colonoscopy revealed extensive internal and external hemorrhoids. The patient had 2 sessile polyps, one at the splenic fl   • Hyperlipidemia 09/07/2007 09/07/2007--initial treatment hyperlipidemia.   • Hypothyroidism 06/18/2008 06/18/2008--initial diagnosis and treatment hypothyroidism.   • Multiple environmental allergies 05/17/2016    Patient had allergy testing as a child.   • Type 2 diabetes mellitus without complication, without long-term current use of insulin ([facility]) 07/17/2006 July 2006--initial diagnosis diabetes.   • Vitamin D deficiency 06/16/2016         Past Surgical History:   Procedure Laterality Date   • COLONOSCOPY  01/26/2015 01/26/2015--colonoscopy revealed extensive internal and external hemorrhoids. The patient had 2 sessile polyps, one at the splenic flexure and the other in the ascending colon near the hepatic flexure which were removed and sent for pathology. Left colon diverticulosis also noted. The ascending colon polyp returned tubular adenoma. The splenic flexure polyp was markedly cauterized and partially de   • COLONOSCOPY N/A 12/18/2018 12/18/2018--colonoscopy revealed multiple small and large mouth diverticula in the sigmoid and descending colon.  There was evidence of an impacted diverticulum.  No bleeding.  Nonbleeding internal hemorrhoids noted.  Grade 3.  No polyps noted.  No  "specimens taken.         No Known Allergies        Current Outpatient Medications:   •  aspirin 81 MG EC tablet, Take 1 tablet by mouth daily., Disp: , Rfl:   •  Chlorcyclizine-Pseudoephed 25-60 MG tablet, Take 1 p.o. twice daily for allergies and congestion, Disp: 180 tablet, Rfl: 3  •  Cholecalciferol (VITAMIN D) 2000 UNITS capsule, Take 1 capsule by mouth daily., Disp: , Rfl:   •  glyburide (DIAbeta) 5 MG tablet, TAKE 1 TABLET TWICE DAILY  WITH MEALS, Disp: 180 tablet, Rfl: 2  •  Janumet XR  MG tablet, TAKE 1 TABLET TWICE A DAY, Disp: 180 tablet, Rfl: 2  •  levothyroxine (Synthroid) 125 MCG tablet, TAKE 1 TABLET DAILY FOR    THYROID, Disp: 90 tablet, Rfl: 3  •  lisinopril (PRINIVIL,ZESTRIL) 10 MG tablet, TAKE 1 TABLET DAILY, Disp: 90 tablet, Rfl: 2  •  Semaglutide,0.25 or 0.5MG/DOS, (Ozempic, 0.25 or 0.5 MG/DOSE,) 2 MG/1.5ML solution pen-injector, Inject 0.25 mg subcutaneously once weekly x4 weeks and then increase to 0.5 mg subcutaneous weekly thereafter., Disp: 3 pen, Rfl: 3  •  simvastatin (ZOCOR) 40 MG tablet, TAKE 1 TABLET EVERY NIGHT, Disp: 90 tablet, Rfl: 2  •  tadalafil (Cialis) 5 MG tablet, Take 1 tablet by mouth Daily., Disp: 30 tablet, Rfl: 5      Family History   Problem Relation Age of Onset   • Diabetes Mother         Type 2         Social History     Socioeconomic History   • Marital status:    • Number of children: 0   • Highest education level: Bachelor's degree (e.g., BA, AB, BS)   Tobacco Use   • Smoking status: Never   • Smokeless tobacco: Never   Vaping Use   • Vaping Use: Never used   Substance and Sexual Activity   • Alcohol use: No   • Drug use: No   • Sexual activity: Yes     Partners: Female         Vitals:    10/12/22 0732   BP: 110/64   Pulse: 89   Resp: 18   SpO2: 99%   Weight: 87.8 kg (193 lb 9.6 oz)   Height: 177.8 cm (70\")        Body mass index is 27.78 kg/m².      Physical Exam:    General: Alert and oriented x 3.  No acute distress.  Mildly overweight.  Normal " affect.  HEENT: Pupils equal, round, reactive to light; extraocular movements intact; sclerae nonicteric; pharynx, ear canals and TMs normal.  Neck: Without JVD, thyromegaly, bruit, or adenopathy.  Lungs: Clear to auscultation in all fields.  Heart: Regular rate and rhythm without murmur, rub, gallop, or click.  Abdomen: Soft, nontender, without hepatosplenomegaly or hernia.  Bowel sounds normal.  : Deferred.  Rectal: Deferred.  Extremities: Without clubbing, cyanosis, edema, or pulse deficit.  Neurologic: Intact without focal deficit.  Normal station and gait observed during ingress and egress from the examination room.  Skin: Without significant lesion.  Musculoskeletal: Unremarkable.    October 12, 2022--routine diabetic foot exam reveals no evidence of diabetic foot ulcer or preulcerative callus.  Distal pulses are palpable.  Sensation intact.    Lab/other results:    CBC is normal except hemoglobin low at 12.4 hematocrit low at 37.3.  CPK normal.  CMP normal.  Hemoglobin A1c 7.3.  Total cholesterol 117, triglycerides mildly elevated 164, LDL particle number excellent at 556, HDL particle number little low at 30.0. Microalbumin creatinine ratio indicates no microalbumin.  Vitamin D normal at 71.1.  Urinalysis normal.  TSH slightly suppressed at 0.12.  Free T4 slightly elevated 186.  Free T3 normal.  PSA normal at 0.205.    Assessment/Plan:     Diagnosis Plan   1. Routine physical examination        2. Type 2 diabetes mellitus without complication, without long-term current use of insulin (HCC)  Comprehensive Metabolic Panel    Hemoglobin A1c      3. Hyperlipidemia  CK    Comprehensive Metabolic Panel    NMR LipoProfile      4. Primary hypothyroidism  TSH    T4, Free    T3, Free      5. Benign essential hypertension  Comprehensive Metabolic Panel      6. Vitamin D deficiency  Vitamin D 25 Hydroxy      7. Chronic anemia  CBC (No Diff)      8. History of colon polyps, 12/18/2018--negative.  01/26/2015--tubular  adenoma ×1.  Probable hyperplastic ×1.  Repeat due 3 years.        9. Multiple environmental allergies        10. Diabetic foot exam        11. Overweight (BMI 25.0-29.9)        12. Therapeutic drug monitoring        13. Need for influenza vaccination  Fluzone High-Dose 65+yrs ([id number])      14. Need for pneumococcal vaccination  Pneumococcal Conjugate Vaccine 20-Valent (PCV20)        Patient presents with essentially normal annual physical except for the following issues: He has type 2 diabetes which is under good control.  He is mildly overweight and strongly recommend low carbohydrate diet, exercise, and attainment of ideal body weight.  Hyperlipidemia is under excellent control.  His TSH is a little suppressed but not enough to make any changes.  Vitamin D is in normal range with supplementation.  Chronic anemia is stable and has been previously worked up.  Patient has a history of colon polyps and is up-to-date on his colonoscopy.  Environmental allergies controlled with Stahist.    Several preventative health issues discussed including review of vaccinations and recommendations, including dietary issues, exercise and weight loss.  Safe sex practices discussed.  Patient advised to wear seatbelt whenever driving and avoid texting and driving.  Also advised to look both ways before crossing the street.  Patient is up-to-date on colon cancer screening.  Advised to avoid tobacco products and minimize alcohol consumption.    Plan is as follows: Recommend influenza and Prevnar 20 vaccine.  Diet issues discussed above.  No changes in current medical regimen.  Patient will follow-up in 6 months with lab prior or follow-up as needed.      Procedures

## 2023-03-02 DIAGNOSIS — E11.9 TYPE 2 DIABETES MELLITUS WITHOUT COMPLICATION, WITHOUT LONG-TERM CURRENT USE OF INSULIN: Chronic | ICD-10-CM

## 2023-03-03 RX ORDER — SEMAGLUTIDE 1.34 MG/ML
INJECTION, SOLUTION SUBCUTANEOUS
Qty: 4.5 ML | Refills: 3 | Status: SHIPPED | OUTPATIENT
Start: 2023-03-03

## 2023-03-07 DIAGNOSIS — E03.9 HYPOTHYROIDISM, UNSPECIFIED TYPE: Chronic | ICD-10-CM

## 2023-03-08 RX ORDER — LEVOTHYROXINE SODIUM 0.12 MG/1
TABLET ORAL
Qty: 90 TABLET | Refills: 0 | Status: SHIPPED | OUTPATIENT
Start: 2023-03-08

## 2023-04-13 ENCOUNTER — OFFICE VISIT (OUTPATIENT)
Dept: INTERNAL MEDICINE | Facility: CLINIC | Age: 68
End: 2023-04-13
Payer: COMMERCIAL

## 2023-04-13 VITALS
WEIGHT: 200 LBS | HEART RATE: 80 BPM | SYSTOLIC BLOOD PRESSURE: 110 MMHG | OXYGEN SATURATION: 97 % | BODY MASS INDEX: 28.63 KG/M2 | TEMPERATURE: 96.7 F | HEIGHT: 70 IN | RESPIRATION RATE: 16 BRPM | DIASTOLIC BLOOD PRESSURE: 60 MMHG

## 2023-04-13 DIAGNOSIS — E66.3 OVERWEIGHT (BMI 25.0-29.9): Chronic | ICD-10-CM

## 2023-04-13 DIAGNOSIS — E11.9 TYPE 2 DIABETES MELLITUS WITHOUT COMPLICATION, WITHOUT LONG-TERM CURRENT USE OF INSULIN: Primary | Chronic | ICD-10-CM

## 2023-04-13 DIAGNOSIS — Z51.81 THERAPEUTIC DRUG MONITORING: ICD-10-CM

## 2023-04-13 DIAGNOSIS — E78.2 MIXED HYPERLIPIDEMIA: Chronic | ICD-10-CM

## 2023-04-13 DIAGNOSIS — D64.9 CHRONIC ANEMIA: Chronic | ICD-10-CM

## 2023-04-13 DIAGNOSIS — I10 BENIGN ESSENTIAL HYPERTENSION: Chronic | ICD-10-CM

## 2023-04-13 DIAGNOSIS — Z86.010 HISTORY OF COLON POLYPS: Chronic | ICD-10-CM

## 2023-04-13 DIAGNOSIS — E55.9 VITAMIN D DEFICIENCY: Chronic | ICD-10-CM

## 2023-04-13 DIAGNOSIS — E03.9 PRIMARY HYPOTHYROIDISM: Chronic | ICD-10-CM

## 2023-04-13 DIAGNOSIS — Z00.00 ROUTINE PHYSICAL EXAMINATION: ICD-10-CM

## 2023-04-13 NOTE — PROGRESS NOTES
04/13/2023    Patient Information  Lionel Carson                                                                                          [address]  [address]  [facility] [address]      1955  [unfilled]  There is no work phone number on file.    Chief Complaint:     Follow-up medical problems as outlined below.  No new acute complaints.    History of Present Illness:    Patient with a history of multiple medical problems that are chronic and noted below in the assessment and plan.  He presents today for follow-up with lab prior in order to monitor his chronic medical issues.  Past medical history reviewed and updated were necessary including health maintenance parameters.  This reveals he is currently up-to-date or else accounted for.    Review of Systems   Constitutional: Negative. Negative for weight loss.   HENT: Negative.    Eyes: Negative.  Negative for blurred vision.   Cardiovascular: Negative.  Negative for chest pain.   Respiratory: Negative.    Endocrine: Negative.  Negative for polydipsia, polyphagia and polyuria.   Hematologic/Lymphatic: Negative.    Skin: Negative.    Musculoskeletal: Negative.    Gastrointestinal: Negative.    Genitourinary: Negative.    Neurological: Negative.  Negative for dizziness, headaches, seizures, tremors and weakness.   Psychiatric/Behavioral: Negative.  The patient is not nervous/anxious.    Allergic/Immunologic: Negative.        Active Problems:    Patient Active Problem List   Diagnosis   • Allergic rhinitis   • Benign essential hypertension   • History of colon polyps, 12/18/2018--negative.  01/26/2015--tubular adenoma ×1.  Probable hyperplastic ×1.  Repeat due 3 years.   • Diverticulosis of colon   • Hyperlipidemia   • Primary hypothyroidism   • Multiple environmental allergies   • Type 2 diabetes mellitus without complication, without long-term current use of insulin   • Vitamin D deficiency   • Therapeutic drug monitoring   • Routine physical  examination   • Diabetic foot exam   • Diabetic eye exam   • Chronic anemia   • Nuclear cataract   • Overweight (BMI 25.0-29.9)         Past Medical History:   Diagnosis Date   • Benign essential hypertension 09/07/2007 09/07/2007--initial treatment with lisinopril that was primarily for renal protection.   • Chronic anemia 08/03/2017 08/03/2017--routine physical.  Hemoglobin is low at 13.3.  Hematocrit low at 40.0.  Indices are normal.  Repeat CBC, iron studies, homocystine, and methylmalonic acid ordered.  Patient will follow-up on the phone for the results and possible further instructions.  He is aware that it takes 2 weeks for one lab test to return.   • Diabetic eye exam 03/17/2017 03/17/2017--routine diabetic eye exam reveals no evidence of diabetic retinopathy.  Cataracts both eyes.  Observation recommended.  07/09/2014--routine ophthalmologic examination reveals no evidence of diabetic retinopathy.     02/02/2009--eye examination revealed no evidence of diabetic retinopathy, ocular hypertension noted OD.   • Diabetic foot exam 08/03/2017 08/03/2017--routine diabetic foot exam reveals no evidence of diabetic foot ulcer or pre-ulcerative callus.  Distal pulses are very palpable and there is no signs of ischemia.  Sensation subjectively intact.   • Diverticulosis of colon 01/26/2015 01/26/2015--colonoscopy revealed extensive internal and external hemorrhoids. The patient had 2 sessile polyps, one at the splenic flexure and the other in the ascending colon near the hepatic flexure which were removed and sent for pathology. Left colon diverticulosis also noted. The ascending colon polyp returned tubular adenoma. The splenic flexure polyp was markedly cauterized and partially denuded. Focal hyperplastic features. No definite evidence of adenomatous change. Repeat study due 3 years.   • History of anemia 02/04/2015 12/06/2015--hemoglobin normal at 13.2, hematocrit normal at 39.5. Resolve this  issue.   02/04/2015--homocystine and methylmalonic acid are normal. Serum iron low normal at 62. Iron saturation low at 16%. Colonoscopy ordered and returned one hyperplastic polyp and one tubular adenomatous polyp.   01/25/2015--routine CBC reveals a hemoglobin low at 13.1, hematocrit low at 40.3. Homocystine, methylma   • History of colon polyps, 12/18/2018--negative.  01/26/2015--tubular adenoma ×1.  Probable hyperplastic ×1.  Repeat due 3 years. 01/26/2015 12/18/2018--colonoscopy revealed multiple small and large mouth diverticula in the sigmoid and descending colon.  There was evidence of an impacted diverticulum.  No bleeding.  Nonbleeding internal hemorrhoids noted.  Grade 3.  No polyps noted.  No specimens taken.  01/26/2015--colonoscopy revealed extensive internal and external hemorrhoids. The patient had 2 sessile polyps, one at the splenic fl   • Hyperlipidemia 09/07/2007 09/07/2007--initial treatment hyperlipidemia.   • Hypothyroidism 06/18/2008 06/18/2008--initial diagnosis and treatment hypothyroidism.   • Multiple environmental allergies 05/17/2016    Patient had allergy testing as a child.   • Type 2 diabetes mellitus without complication, without long-term current use of insulin 07/17/2006 July 2006--initial diagnosis diabetes.   • Vitamin D deficiency 06/16/2016         Past Surgical History:   Procedure Laterality Date   • COLONOSCOPY  01/26/2015 01/26/2015--colonoscopy revealed extensive internal and external hemorrhoids. The patient had 2 sessile polyps, one at the splenic flexure and the other in the ascending colon near the hepatic flexure which were removed and sent for pathology. Left colon diverticulosis also noted. The ascending colon polyp returned tubular adenoma. The splenic flexure polyp was markedly cauterized and partially de   • COLONOSCOPY N/A 12/18/2018 12/18/2018--colonoscopy revealed multiple small and large mouth diverticula in the sigmoid and descending  "colon.  There was evidence of an impacted diverticulum.  No bleeding.  Nonbleeding internal hemorrhoids noted.  Grade 3.  No polyps noted.  No specimens taken.         No Known Allergies        Current Outpatient Medications:   •  aspirin 81 MG EC tablet, Take 1 tablet by mouth Daily., Disp: , Rfl:   •  Chlorcyclizine-Pseudoephed 25-60 MG tablet, Take 1 p.o. twice daily for allergies and congestion, Disp: 180 tablet, Rfl: 3  •  Cholecalciferol (VITAMIN D) 2000 UNITS capsule, Take 1 capsule by mouth Daily., Disp: , Rfl:   •  glyburide (DIAbeta) 5 MG tablet, TAKE 1 TABLET TWICE DAILY  WITH MEALS, Disp: 180 tablet, Rfl: 2  •  Janumet XR  MG tablet, TAKE 1 TABLET TWICE A DAY, Disp: 180 tablet, Rfl: 2  •  levothyroxine (Synthroid) 125 MCG tablet, TAKE 1 TABLET DAILY FOR    THYROID, Disp: 90 tablet, Rfl: 0  •  lisinopril (PRINIVIL,ZESTRIL) 10 MG tablet, TAKE 1 TABLET DAILY, Disp: 90 tablet, Rfl: 2  •  simvastatin (ZOCOR) 40 MG tablet, TAKE 1 TABLET EVERY NIGHT, Disp: 90 tablet, Rfl: 2  •  tadalafil (Cialis) 5 MG tablet, Take 1 tablet by mouth Daily., Disp: 30 tablet, Rfl: 5  •  Semaglutide, 1 MG/DOSE, (OZEMPIC) 4 MG/3ML solution pen-injector, Inject 1 mg subcutaneously every week as directed for diabetes, Disp: 9 mL, Rfl: 3      Family History   Problem Relation Age of Onset   • Diabetes Mother         Type 2         Social History     Socioeconomic History   • Marital status:    • Number of children: 0   • Highest education level: Bachelor's degree (e.g., BA, AB, BS)   Tobacco Use   • Smoking status: Never   • Smokeless tobacco: Never   Vaping Use   • Vaping Use: Never used   Substance and Sexual Activity   • Alcohol use: No   • Drug use: No   • Sexual activity: Yes     Partners: Female         Vitals:    04/13/23 0735   BP: 110/60   Pulse: 80   Resp: 16   Temp: 96.7 °F (35.9 °C)   SpO2: 97%   Weight: 90.7 kg (200 lb)   Height: 177.8 cm (70\")        Body mass index is 28.7 kg/m².      Physical " Exam:    General: Alert and oriented x 3.  No acute distress.  Normal affect.  Overweight.  HEENT: Pupils equal, round, reactive to light; extraocular movements intact; sclerae nonicteric; pharynx, ear canals and TMs normal.  Neck: Without JVD, thyromegaly, bruit, or adenopathy.  Lungs: Clear to auscultation in all fields.  Heart: Regular rate and rhythm without murmur, rub, gallop, or click.  Abdomen: Soft, nontender, without hepatosplenomegaly or hernia.  Bowel sounds normal.  : Deferred.  Rectal: Deferred.  Extremities: Without clubbing, cyanosis, edema, or pulse deficit.  Neurologic: Intact without focal deficit.  Normal station and gait observed during ingress and egress from the examination room.  Skin: Without significant lesion.  Musculoskeletal: Unremarkable.    Lab/other results:    CBC normal except hemoglobin 12.4 hematocrit 37.1.  CPK normal.  CMP normal except glucose 199.  Hemoglobin A1c 9.2.  Total cholesterol 121.  Triglycerides 152.  LDL particle #569.  HDL particle #34.9.  Thyroid function tests are normal.  Vitamin D normal at 62.5.    Assessment/Plan:     Diagnosis Plan   1. Type 2 diabetes mellitus without complication, without long-term current use of insulin  Semaglutide, 1 MG/DOSE, (OZEMPIC) 4 MG/3ML solution pen-injector      2. Hyperlipidemia        3. Primary hypothyroidism        4. Benign essential hypertension        5. History of colon polyps, 12/18/2018--negative.  01/26/2015--tubular adenoma ×1.  Probable hyperplastic ×1.  Repeat due 3 years.        6. Vitamin D deficiency        7. Chronic anemia        8. Overweight (BMI 25.0-29.9)        9. Therapeutic drug monitoring        10. Routine physical examination          Patient has type 2 diabetes that appears to be under less than optimal control.  His previous hemoglobin A1c's over the past year have been in the 7 range.  Were not quite sure exactly why I his A1c has increased.  His weight is about the same.  I think he would be  a candidate for increasing the dose of the Ozempic.  He is currently on 0.5 mg subcutaneously weekly.  This will help reduce his cardiovascular risk and also help with maintaining/obtaining ideal body weight.  Hyperlipidemia is under excellent control with simvastatin.  Blood pressure appears to be well controlled with lisinopril 10 mg/day.  His thyroid is therapeutic on the current dose of levothyroxine.  He has a history of colon polyps and is not due for colonoscopy until the end of this year.  Vitamin D in normal range with supplementation.  Chronic anemia is mild and very stable.    Plan is as follows: I recommended patient follow a low carbohydrate diet and exercise.  Weight loss is important.  Increase Ozempic to 1 mg subcutaneously weekly.  Patient will follow-up on or shortly after October 12, 2023 with lab prior for his annual physical.  Otherwise he will follow-up as needed.        Procedures

## 2023-06-19 DIAGNOSIS — E03.9 HYPOTHYROIDISM, UNSPECIFIED TYPE: Chronic | ICD-10-CM

## 2023-06-19 RX ORDER — LEVOTHYROXINE SODIUM 0.12 MG/1
TABLET ORAL
Qty: 90 TABLET | Refills: 0 | Status: SHIPPED | OUTPATIENT
Start: 2023-06-19

## 2023-09-20 NOTE — TELEPHONE ENCOUNTER
PATIENT HAS QUESTIONS REGARDING DOSAGE AND DIRECTIONS ON SOME MEDICATIONS.    PLEASE CALL BACK AND ADVISE.    [phone]   no

## 2023-10-02 RX ORDER — TADALAFIL 5 MG/1
TABLET ORAL
Qty: 30 TABLET | Refills: 5 | Status: SHIPPED | OUTPATIENT
Start: 2023-10-02

## 2023-10-03 DIAGNOSIS — E03.9 HYPOTHYROIDISM, UNSPECIFIED TYPE: Chronic | ICD-10-CM

## 2023-10-03 RX ORDER — LEVOTHYROXINE SODIUM 0.12 MG/1
TABLET ORAL
Qty: 90 TABLET | Refills: 0 | Status: SHIPPED | OUTPATIENT
Start: 2023-10-03

## 2023-10-17 ENCOUNTER — OFFICE VISIT (OUTPATIENT)
Dept: INTERNAL MEDICINE | Facility: CLINIC | Age: 68
End: 2023-10-17
Payer: COMMERCIAL

## 2023-10-17 VITALS
HEART RATE: 84 BPM | BODY MASS INDEX: 28.35 KG/M2 | RESPIRATION RATE: 16 BRPM | OXYGEN SATURATION: 98 % | SYSTOLIC BLOOD PRESSURE: 118 MMHG | TEMPERATURE: 97.1 F | HEIGHT: 70 IN | WEIGHT: 198 LBS | DIASTOLIC BLOOD PRESSURE: 66 MMHG

## 2023-10-17 DIAGNOSIS — Z91.09 MULTIPLE ENVIRONMENTAL ALLERGIES: Chronic | ICD-10-CM

## 2023-10-17 DIAGNOSIS — D64.9 CHRONIC ANEMIA: Chronic | ICD-10-CM

## 2023-10-17 DIAGNOSIS — E55.9 VITAMIN D DEFICIENCY: Chronic | ICD-10-CM

## 2023-10-17 DIAGNOSIS — E78.2 MIXED HYPERLIPIDEMIA: Chronic | ICD-10-CM

## 2023-10-17 DIAGNOSIS — E11.9 TYPE 2 DIABETES MELLITUS WITHOUT COMPLICATION, WITHOUT LONG-TERM CURRENT USE OF INSULIN: Chronic | ICD-10-CM

## 2023-10-17 DIAGNOSIS — E66.3 OVERWEIGHT (BMI 25.0-29.9): Chronic | ICD-10-CM

## 2023-10-17 DIAGNOSIS — E03.9 PRIMARY HYPOTHYROIDISM: Chronic | ICD-10-CM

## 2023-10-17 DIAGNOSIS — Z12.11 COLON CANCER SCREENING: ICD-10-CM

## 2023-10-17 DIAGNOSIS — Z51.81 THERAPEUTIC DRUG MONITORING: ICD-10-CM

## 2023-10-17 DIAGNOSIS — Z23 NEED FOR INFLUENZA VACCINATION: ICD-10-CM

## 2023-10-17 DIAGNOSIS — Z86.010 HISTORY OF COLON POLYPS: Chronic | ICD-10-CM

## 2023-10-17 DIAGNOSIS — I10 BENIGN ESSENTIAL HYPERTENSION: Chronic | ICD-10-CM

## 2023-10-17 DIAGNOSIS — E11.9 ENCOUNTER FOR DIABETIC FOOT EXAM: Chronic | ICD-10-CM

## 2023-10-17 DIAGNOSIS — Z00.00 ROUTINE PHYSICAL EXAMINATION: Primary | ICD-10-CM

## 2023-10-17 RX ORDER — SEMAGLUTIDE 2.68 MG/ML
INJECTION, SOLUTION SUBCUTANEOUS
Qty: 9 ML | Refills: 3 | Status: SHIPPED | OUTPATIENT
Start: 2023-10-17

## 2023-10-17 NOTE — PROGRESS NOTES
10/17/2023    Patient Information  Lionel Carson                                                                                          [address]  [address]  [facility] [address]      1955  [unfilled]  There is no work phone number on file.    Chief Complaint:     Routine annual physical exam/preventative visit.  No new acute complaints.    History of Present Illness:    Patient with type 2 diabetes and multiple medical problems as outlined below in the assessment and plan presents today for a routine physical/preventative visit.  His past medical history reviewed and updated were necessary including health maintenance parameters.  This reveals he is up-to-date or else accounted for after today's visit with the exception of influenza vaccine.  See below.    Review of Systems   Constitutional: Negative.   HENT: Negative.     Eyes: Negative.    Cardiovascular: Negative.    Respiratory: Negative.     Endocrine: Negative.    Hematologic/Lymphatic: Negative.    Skin: Negative.    Musculoskeletal: Negative.    Gastrointestinal: Negative.    Genitourinary: Negative.    Neurological: Negative.    Psychiatric/Behavioral: Negative.     Allergic/Immunologic: Negative.        Active Problems:    Patient Active Problem List   Diagnosis    Allergic rhinitis    Benign essential hypertension    History of colon polyps, 12/18/2018--negative.  01/26/2015--tubular adenoma ×1.  Probable hyperplastic ×1.  Repeat due 3 years.    Diverticulosis of colon    Hyperlipidemia    Primary hypothyroidism    Multiple environmental allergies    Type 2 diabetes mellitus without complication, without long-term current use of insulin    Vitamin D deficiency    Therapeutic drug monitoring    Routine physical examination    Diabetic foot exam    Diabetic eye exam    Nuclear cataract    Overweight (BMI 25.0-29.9)         Past Medical History:   Diagnosis Date    Benign essential hypertension 09/07/2007 09/07/2007--initial  treatment with lisinopril that was primarily for renal protection.    Chronic anemia 08/03/2017 08/03/2017--routine physical.  Hemoglobin is low at 13.3.  Hematocrit low at 40.0.  Indices are normal.  Repeat CBC, iron studies, homocystine, and methylmalonic acid ordered.  Patient will follow-up on the phone for the results and possible further instructions.  He is aware that it takes 2 weeks for one lab test to return.    Diverticulosis of colon 01/26/2015 01/26/2015--colonoscopy revealed extensive internal and external hemorrhoids. The patient had 2 sessile polyps, one at the splenic flexure and the other in the ascending colon near the hepatic flexure which were removed and sent for pathology. Left colon diverticulosis also noted. The ascending colon polyp returned tubular adenoma. The splenic flexure polyp was markedly cauterized and partially denuded. Focal hyperplastic features. No definite evidence of adenomatous change. Repeat study due 3 years.    History of anemia 02/04/2015 12/06/2015--hemoglobin normal at 13.2, hematocrit normal at 39.5. Resolve this issue.   02/04/2015--homocystine and methylmalonic acid are normal. Serum iron low normal at 62. Iron saturation low at 16%. Colonoscopy ordered and returned one hyperplastic polyp and one tubular adenomatous polyp.   01/25/2015--routine CBC reveals a hemoglobin low at 13.1, hematocrit low at 40.3. Homocystine, methylma    History of colon polyps, 12/18/2018--negative.  01/26/2015--tubular adenoma ×1.  Probable hyperplastic ×1.  Repeat due 3 years. 01/26/2015 12/18/2018--colonoscopy revealed multiple small and large mouth diverticula in the sigmoid and descending colon.  There was evidence of an impacted diverticulum.  No bleeding.  Nonbleeding internal hemorrhoids noted.  Grade 3.  No polyps noted.  No specimens taken.  01/26/2015--colonoscopy revealed extensive internal and external hemorrhoids. The patient had 2 sessile polyps, one at the  splenic fl    Hyperlipidemia 09/07/2007 09/07/2007--initial treatment hyperlipidemia.    Hypothyroidism 06/18/2008 06/18/2008--initial diagnosis and treatment hypothyroidism.    Multiple environmental allergies 05/17/2016    Patient had allergy testing as a child.    Type 2 diabetes mellitus without complication, without long-term current use of insulin 07/17/2006 July 2006--initial diagnosis diabetes.    Vitamin D deficiency 06/16/2016         Past Surgical History:   Procedure Laterality Date    COLONOSCOPY  01/26/2015 01/26/2015--colonoscopy revealed extensive internal and external hemorrhoids. The patient had 2 sessile polyps, one at the splenic flexure and the other in the ascending colon near the hepatic flexure which were removed and sent for pathology. Left colon diverticulosis also noted. The ascending colon polyp returned tubular adenoma. The splenic flexure polyp was markedly cauterized and partially de    COLONOSCOPY N/A 12/18/2018 12/18/2018--colonoscopy revealed multiple small and large mouth diverticula in the sigmoid and descending colon.  There was evidence of an impacted diverticulum.  No bleeding.  Nonbleeding internal hemorrhoids noted.  Grade 3.  No polyps noted.  No specimens taken.         No Known Allergies        Current Outpatient Medications:     aspirin 81 MG EC tablet, Take 1 tablet by mouth Daily., Disp: , Rfl:     Cholecalciferol (VITAMIN D) 2000 UNITS capsule, Take 1 capsule by mouth Daily., Disp: , Rfl:     glyburide (DIAbeta) 5 MG tablet, TAKE 1 TABLET TWICE DAILY  WITH MEALS, Disp: 180 tablet, Rfl: 2    Janumet XR  MG tablet, TAKE 1 TABLET TWICE A DAY, Disp: 180 tablet, Rfl: 2    levothyroxine (Synthroid) 125 MCG tablet, TAKE 1 TABLET DAILY FOR    THYROID, Disp: 90 tablet, Rfl: 0    lisinopril (PRINIVIL,ZESTRIL) 10 MG tablet, TAKE 1 TABLET DAILY, Disp: 90 tablet, Rfl: 2    simvastatin (ZOCOR) 40 MG tablet, TAKE 1 TABLET EVERY NIGHT, Disp: 90 tablet, Rfl: 2     "tadalafil (CIALIS) 5 MG tablet, TAKE ONE TABLET BY MOUTH DAILY, Disp: 30 tablet, Rfl: 5    Semaglutide, 2 MG/DOSE, (Ozempic, 2 MG/DOSE,) 8 MG/3ML solution pen-injector, Inject 2 mg subcutaneously weekly as directed for diabetes, Disp: 9 mL, Rfl: 3      Family History   Problem Relation Age of Onset    Diabetes Mother         Type 2         Social History     Socioeconomic History    Marital status:     Number of children: 0    Highest education level: Bachelor's degree (e.g., BA, AB, BS)   Tobacco Use    Smoking status: Never    Smokeless tobacco: Never   Vaping Use    Vaping Use: Never used   Substance and Sexual Activity    Alcohol use: No    Drug use: No    Sexual activity: Yes     Partners: Female         Vitals:    10/17/23 0726   BP: 118/66   Pulse: 84   Resp: 16   Temp: 97.1 °F (36.2 °C)   TempSrc: Temporal   SpO2: 98%   Weight: 89.8 kg (198 lb)   Height: 177.8 cm (70\")        Body mass index is 28.41 kg/m².      Physical Exam:    General: Alert and oriented x 3.  No acute distress.  Mildly overweight.  Normal affect.  HEENT: Pupils equal, round, reactive to light; extraocular movements intact; sclerae nonicteric; pharynx, ear canals and TMs normal.  Neck: Without JVD, thyromegaly, bruit, or adenopathy.  Lungs: Clear to auscultation in all fields.  Heart: Regular rate and rhythm without murmur, rub, gallop, or click.  Abdomen: Soft, nontender, without hepatosplenomegaly or hernia.  Bowel sounds normal.  : Deferred.  Rectal: Deferred.  Extremities: Without clubbing, cyanosis, edema, or pulse deficit.  Neurologic: Intact without focal deficit.  Normal station and gait observed during ingress and egress from the examination room.  Skin: Without significant lesion.  Musculoskeletal: Unremarkable.    October 17, 2023--routine diabetic foot exam reveals no evidence of diabetic foot ulcer or preulcerative callus.  Distal pulses palpable.  Sensation appears intact.    Lab/other results:    CBC normal.  CPK " normal.  CMP normal except glucose 146, creatinine 1.46, estimated GFR 53.  Hemoglobin A1c 7.9.  Microalbumin/creatinine ratio is 6.  Total cholesterol 119, triglycerides 159, LDL particle #611, HDL particle #29.2.  TSH is elevated at 13.4.  Free T3 and free T4 are normal.  Urinalysis normal.  Vitamin D normal at 68.8.  PSA normal at 0.3.    Assessment/Plan:     Diagnosis Plan   1. Routine physical examination        2. Type 2 diabetes mellitus without complication, without long-term current use of insulin  Semaglutide, 2 MG/DOSE, (Ozempic, 2 MG/DOSE,) 8 MG/3ML solution pen-injector    Comprehensive Metabolic Panel    Hemoglobin A1c      3. Primary hypothyroidism  TSH    T4, Free    T3, Free      4. Vitamin D deficiency  Vitamin D,25-Hydroxy      5. Hyperlipidemia  CK    Comprehensive Metabolic Panel    NMR LipoProfile      6. Chronic anemia  CBC (No Diff)      7. Benign essential hypertension  Comprehensive Metabolic Panel      8. History of colon polyps, 12/18/2018--negative.  01/26/2015--tubular adenoma ×1.  Probable hyperplastic ×1.  Repeat due 3 years.  Ambulatory Referral For Screening Colonoscopy      9. Multiple environmental allergies        10. Overweight (BMI 25.0-29.9)        11. Diabetic foot exam        12. Therapeutic drug monitoring        13. Need for influenza vaccination  Fluzone High-Dose 65+yrs ([id number])      14. Colon cancer screening  Ambulatory Referral For Screening Colonoscopy        Patient presents for his routine annual physical exam and seems to be doing fairly well except for the following issues: He has type 2 diabetes is under fairly reasonable control but I would like it to be better.  Patient is mildly overweight and I strongly encouraged low carbohydrate diet and weight loss.  His TSH is markedly elevated and I suspect it is either laboratory error or patient missed the dose of his levothyroxine prior to the blood draw.  This needs to be reassessed today.  Hyperlipidemia is  under reasonable control with simvastatin.  Patient has a history of colon polyps and is due a colonoscopy in December of this year.  His environmental allergies currently seem to be reasonably controlled.  Blood pressure also appears to be controlled on lisinopril.    Several preventative health issues discussed including review of vaccinations and recommendations, including dietary issues, exercise and weight loss.  Safe sex practices discussed.  Patient advised to wear seatbelt whenever driving and avoid texting and driving.  Also advised to look both ways before crossing the street.  Colon cancer prevention discussed and is up-to-date with colonoscopy.  Advised to avoid tobacco products and minimize alcohol consumption.    Plan is as follows: Diet and attainment of ideal body weight as discussed.  Increase Ozempic to 2 mg subcutaneously weekly.  Patient was out of his thyroid medication for a couple weeks prior to the blood draw and therefore I will not recheck his thyroid function test.  Recommend RSV vaccine.  Colonoscopy ordered for after the first of the year.  No other changes.  Patient will follow-up in 6 months with lab prior or follow-up as needed.      BMI is >= 25 and <30. (Overweight) The following options were offered after discussion;: weight loss educational material (shared in after visit summary), exercise counseling/recommendations, and nutrition counseling/recommendations     Procedures

## 2023-10-26 ENCOUNTER — TELEPHONE (OUTPATIENT)
Dept: INTERNAL MEDICINE | Facility: CLINIC | Age: 68
End: 2023-10-26

## 2023-10-26 NOTE — TELEPHONE ENCOUNTER
Caller: Lionel Carson    Relationship: Self    Best call back number: [phone]    What is the best time to reach you: ANYTIME     Who are you requesting to speak with (clinical staff, provider,  specific staff member): DR. TANG     What was the call regarding: PATIENT STATES HE RECEIVED A MESSAGE FROM [facility] STATING THEY ARE MISSING IMPORTANT INFORMATION REGARDING THE OZEMPIC.    PATIENT STATES [facility] TOLD HIM THEY HAVE TRIED REACHING OUT TO THE OFFICE BUT THEY HAVE NOT GOTTEN ANYTHING IN RETURN.    PATIENT STATES HE IS WANTING TO KNOW IF DR. TANG CAN REACH OUT TO [facility] TO SEE WHAT THEY ARE NEEDING.

## 2024-01-06 DIAGNOSIS — E03.9 HYPOTHYROIDISM, UNSPECIFIED TYPE: Chronic | ICD-10-CM

## 2024-01-08 RX ORDER — LEVOTHYROXINE SODIUM 0.12 MG/1
TABLET ORAL
Qty: 90 TABLET | Refills: 0 | Status: SHIPPED | OUTPATIENT
Start: 2024-01-08

## 2024-03-25 ENCOUNTER — TELEPHONE (OUTPATIENT)
Dept: INTERNAL MEDICINE | Facility: CLINIC | Age: 69
End: 2024-03-25

## 2024-03-25 DIAGNOSIS — E11.9 TYPE 2 DIABETES MELLITUS WITHOUT COMPLICATION, WITHOUT LONG-TERM CURRENT USE OF INSULIN: Chronic | ICD-10-CM

## 2024-03-25 DIAGNOSIS — E03.9 HYPOTHYROIDISM, UNSPECIFIED TYPE: Chronic | ICD-10-CM

## 2024-03-25 NOTE — TELEPHONE ENCOUNTER
"  Caller: Lionel Carson \"J Carlos\"    Relationship: Self    Best call back number: [phone]    Requested Prescriptions:   Requested Prescriptions     Pending Prescriptions Disp Refills    glyburide (DIAbeta) 5 MG tablet 180 tablet 2     Sig: Take 1 tablet by mouth 2 (Two) Times a Day With Meals.    SITagliptin-metFORMIN HCl ER (Janumet XR)  MG tablet 180 tablet 2     Sig: Take 1 tablet by mouth 2 (Two) Times a Day.    lisinopril (PRINIVIL,ZESTRIL) 10 MG tablet 90 tablet 2     Sig: Take 1 tablet by mouth Daily.    simvastatin (ZOCOR) 40 MG tablet 90 tablet 2     Si tablet Every Night.    levothyroxine (Synthroid) 125 MCG tablet 90 tablet 0     Sig: TAKE 1 TABLET DAILY FOR    THYROID    Semaglutide, 2 MG/DOSE, (Ozempic, 2 MG/DOSE,) 8 MG/3ML solution pen-injector 9 mL 3     Sig: Inject 2 mg subcutaneously weekly as directed for diabetes    tadalafil (CIALIS) 5 MG tablet 30 tablet 5     Sig: Take 1 tablet by mouth Daily.        Pharmacy where request should be sent: [facility], [address] [phone] [facility] [phone] FX     Last office visit with prescribing clinician: 10/17/2023   Last telemedicine visit with prescribing clinician: Visit date not found   Next office visit with prescribing clinician: 2024     Additional details provided by patient: PATIENT HAS APPROX TWO WEEKS OF EACH    Does the patient have less than a 3 day supply:  [] Yes  [x] No    Would you like a call back once the refill request has been completed: [] Yes [x] No    If the office needs to give you a call back, can they leave a voicemail: [] Yes [x] No    Porter Moura Rep   24 15:08 EDT           "

## 2024-03-26 ENCOUNTER — TELEPHONE (OUTPATIENT)
Dept: INTERNAL MEDICINE | Facility: CLINIC | Age: 69
End: 2024-03-26
Payer: COMMERCIAL

## 2024-03-26 DIAGNOSIS — E78.2 MIXED HYPERLIPIDEMIA: Chronic | ICD-10-CM

## 2024-03-26 DIAGNOSIS — E03.9 PRIMARY HYPOTHYROIDISM: Chronic | ICD-10-CM

## 2024-03-26 DIAGNOSIS — E11.9 TYPE 2 DIABETES MELLITUS WITHOUT COMPLICATION, WITHOUT LONG-TERM CURRENT USE OF INSULIN: Chronic | ICD-10-CM

## 2024-03-26 DIAGNOSIS — N40.1 BENIGN PROSTATIC HYPERPLASIA WITH HESITANCY: ICD-10-CM

## 2024-03-26 DIAGNOSIS — I10 BENIGN ESSENTIAL HYPERTENSION: Primary | Chronic | ICD-10-CM

## 2024-03-26 DIAGNOSIS — R39.11 BENIGN PROSTATIC HYPERPLASIA WITH HESITANCY: ICD-10-CM

## 2024-03-26 DIAGNOSIS — I10 BENIGN ESSENTIAL HYPERTENSION: Chronic | ICD-10-CM

## 2024-03-26 DIAGNOSIS — E03.9 HYPOTHYROIDISM, UNSPECIFIED TYPE: Chronic | ICD-10-CM

## 2024-03-26 RX ORDER — SITAGLIPTIN AND METFORMIN HYDROCHLORIDE 1000; 50 MG/1; MG/1
TABLET, FILM COATED, EXTENDED RELEASE ORAL
Qty: 180 TABLET | Refills: 2 | Status: SHIPPED | OUTPATIENT
Start: 2024-03-26

## 2024-03-26 RX ORDER — LEVOTHYROXINE SODIUM 0.12 MG/1
TABLET ORAL
Qty: 90 TABLET | Refills: 1 | Status: CANCELLED | OUTPATIENT
Start: 2024-03-26

## 2024-03-26 RX ORDER — SIMVASTATIN 40 MG
40 TABLET ORAL NIGHTLY
Qty: 90 TABLET | Refills: 1 | Status: CANCELLED | OUTPATIENT
Start: 2024-03-26

## 2024-03-26 RX ORDER — LISINOPRIL 10 MG/1
TABLET ORAL
Qty: 90 TABLET | Refills: 2 | Status: SHIPPED | OUTPATIENT
Start: 2024-03-26 | End: 2024-03-26 | Stop reason: SDUPTHER

## 2024-03-26 RX ORDER — SITAGLIPTIN AND METFORMIN HYDROCHLORIDE 1000; 50 MG/1; MG/1
1 TABLET, FILM COATED, EXTENDED RELEASE ORAL 2 TIMES DAILY
Qty: 180 TABLET | Refills: 1 | Status: CANCELLED | OUTPATIENT
Start: 2024-03-26

## 2024-03-26 RX ORDER — LISINOPRIL 10 MG/1
TABLET ORAL
Qty: 90 TABLET | Refills: 2 | Status: SHIPPED | OUTPATIENT
Start: 2024-03-26

## 2024-03-26 RX ORDER — GLYBURIDE 5 MG/1
TABLET ORAL
Qty: 180 TABLET | Refills: 2 | Status: SHIPPED | OUTPATIENT
Start: 2024-03-26

## 2024-03-26 RX ORDER — TADALAFIL 5 MG/1
TABLET ORAL
Qty: 90 TABLET | Refills: 2 | Status: SHIPPED | OUTPATIENT
Start: 2024-03-26

## 2024-03-26 RX ORDER — LEVOTHYROXINE SODIUM 0.12 MG/1
TABLET ORAL
Qty: 90 TABLET | Refills: 2 | Status: SHIPPED | OUTPATIENT
Start: 2024-03-26

## 2024-03-26 RX ORDER — SEMAGLUTIDE 2.68 MG/ML
INJECTION, SOLUTION SUBCUTANEOUS
Qty: 9 ML | Refills: 3 | Status: SHIPPED | OUTPATIENT
Start: 2024-03-26

## 2024-03-26 RX ORDER — SIMVASTATIN 40 MG
TABLET ORAL
Qty: 90 TABLET | Refills: 2 | Status: SHIPPED | OUTPATIENT
Start: 2024-03-26 | End: 2024-03-26 | Stop reason: SDUPTHER

## 2024-03-26 RX ORDER — SEMAGLUTIDE 2.68 MG/ML
INJECTION, SOLUTION SUBCUTANEOUS
Qty: 9 ML | Refills: 3 | Status: SHIPPED | OUTPATIENT
Start: 2024-03-26 | End: 2024-03-26 | Stop reason: SDUPTHER

## 2024-03-26 RX ORDER — SIMVASTATIN 40 MG
TABLET ORAL
Qty: 90 TABLET | Refills: 2 | Status: SHIPPED | OUTPATIENT
Start: 2024-03-26

## 2024-03-26 RX ORDER — LEVOTHYROXINE SODIUM 0.12 MG/1
TABLET ORAL
Qty: 90 TABLET | Refills: 2 | Status: SHIPPED | OUTPATIENT
Start: 2024-03-26 | End: 2024-03-26 | Stop reason: SDUPTHER

## 2024-03-26 RX ORDER — GLYBURIDE 5 MG/1
TABLET ORAL
Qty: 180 TABLET | Refills: 2 | Status: SHIPPED | OUTPATIENT
Start: 2024-03-26 | End: 2024-03-26 | Stop reason: SDUPTHER

## 2024-03-26 RX ORDER — LISINOPRIL 10 MG/1
10 TABLET ORAL DAILY
Qty: 90 TABLET | Refills: 1 | Status: CANCELLED | OUTPATIENT
Start: 2024-03-26

## 2024-03-26 RX ORDER — GLYBURIDE 5 MG/1
5 TABLET ORAL 2 TIMES DAILY WITH MEALS
Qty: 180 TABLET | Refills: 1 | Status: CANCELLED | OUTPATIENT
Start: 2024-03-26

## 2024-03-26 RX ORDER — TADALAFIL 5 MG/1
TABLET ORAL
Qty: 90 TABLET | Refills: 2 | Status: SHIPPED | OUTPATIENT
Start: 2024-03-26 | End: 2024-03-26 | Stop reason: SDUPTHER

## 2024-03-26 RX ORDER — TADALAFIL 5 MG/1
5 TABLET ORAL DAILY
Qty: 90 TABLET | Refills: 1 | Status: CANCELLED | OUTPATIENT
Start: 2024-03-26

## 2024-03-26 RX ORDER — SEMAGLUTIDE 2.68 MG/ML
INJECTION, SOLUTION SUBCUTANEOUS
Qty: 9 ML | Refills: 1 | Status: CANCELLED | OUTPATIENT
Start: 2024-03-26

## 2024-03-26 RX ORDER — SITAGLIPTIN AND METFORMIN HYDROCHLORIDE 1000; 50 MG/1; MG/1
TABLET, FILM COATED, EXTENDED RELEASE ORAL
Qty: 180 TABLET | Refills: 2 | Status: SHIPPED | OUTPATIENT
Start: 2024-03-26 | End: 2024-03-26 | Stop reason: SDUPTHER

## 2024-03-26 NOTE — TELEPHONE ENCOUNTER
"    Caller: Lionel Carson \"J Carlos\"    Relationship: Self    Best call back number:[phone]     Which medication are you concerned about:   Semaglutide, 2 MG/DOSE, (Ozempic, 2 MG/DOSE,) 8 MG/3ML solution pen-injector     Janumet XR  MG tablet     : lisinopril (PRINIVIL,ZESTRIL) 10 MG tablet      simvastatin (ZOCOR) 40 MG tablet     levothyroxine (Synthroid) 125 MCG tablet       : tadalafil (CIALIS) 5 MG table     : glyburide (DIAbeta) 5 MG tablet   Who prescribed you this medication: DR TANG.        What are your concerns: PATIENT IS CALLING TO STATE THE PRESCRIPTIONS THAT HE REQUESTED YESTERDAY NEED TO GO TO [facility].  THEY WENT TO LOCAL [facility].  HE IS WANTING TO KNOW IF DR TANG WOULD BE WILLING TO SEND                [facility], [facility]. [address] [phone] [place] [phone]   PLEASE ADVISE.  "

## 2024-03-26 NOTE — TELEPHONE ENCOUNTER
Rx Refill Note  Requested Prescriptions     Pending Prescriptions Disp Refills    glyburide (DIAbeta) 5 MG tablet 180 tablet 1     Sig: Take 1 tablet by mouth 2 (Two) Times a Day With Meals.    SITagliptin-metFORMIN HCl ER (Janumet XR)  MG tablet 180 tablet 1     Sig: Take 1 tablet by mouth 2 (Two) Times a Day.    lisinopril (PRINIVIL,ZESTRIL) 10 MG tablet 90 tablet 1     Sig: Take 1 tablet by mouth Daily.    simvastatin (ZOCOR) 40 MG tablet 90 tablet 1     Sig: Take 1 tablet by mouth Every Night.    levothyroxine (Synthroid) 125 MCG tablet 90 tablet 1     Sig: TAKE 1 TABLET DAILY FOR    THYROID    Semaglutide, 2 MG/DOSE, (Ozempic, 2 MG/DOSE,) 8 MG/3ML solution pen-injector 9 mL 1     Sig: Inject 2 mg subcutaneously weekly as directed for diabetes    tadalafil (CIALIS) 5 MG tablet 90 tablet 1     Sig: Take 1 tablet by mouth Daily.      Last office visit with prescribing clinician: 10/17/2023   Last telemedicine visit with prescribing clinician: Visit date not found   Next office visit with prescribing clinician: 5/1/2024       Coreen Rodriguez MA  03/26/24, 08:06 EDT

## 2024-04-19 ENCOUNTER — LAB (OUTPATIENT)
Dept: LAB | Facility: HOSPITAL | Age: 69
End: 2024-04-19
Payer: COMMERCIAL

## 2024-04-19 PROCEDURE — 84439 ASSAY OF FREE THYROXINE: CPT | Performed by: INTERNAL MEDICINE

## 2024-04-19 PROCEDURE — 80061 LIPID PANEL: CPT | Performed by: INTERNAL MEDICINE

## 2024-04-19 PROCEDURE — 84481 FREE ASSAY (FT-3): CPT | Performed by: INTERNAL MEDICINE

## 2024-04-19 PROCEDURE — 82550 ASSAY OF CK (CPK): CPT | Performed by: INTERNAL MEDICINE

## 2024-04-19 PROCEDURE — 83036 HEMOGLOBIN GLYCOSYLATED A1C: CPT | Performed by: INTERNAL MEDICINE

## 2024-04-19 PROCEDURE — 80050 GENERAL HEALTH PANEL: CPT | Performed by: INTERNAL MEDICINE

## 2024-04-19 PROCEDURE — 82306 VITAMIN D 25 HYDROXY: CPT | Performed by: INTERNAL MEDICINE

## 2024-04-19 PROCEDURE — 83704 LIPOPROTEIN BLD QUAN PART: CPT | Performed by: INTERNAL MEDICINE

## 2024-05-01 ENCOUNTER — OFFICE VISIT (OUTPATIENT)
Dept: INTERNAL MEDICINE | Facility: CLINIC | Age: 69
End: 2024-05-01
Payer: COMMERCIAL

## 2024-05-01 VITALS
HEART RATE: 80 BPM | HEIGHT: 70 IN | BODY MASS INDEX: 27.06 KG/M2 | TEMPERATURE: 97.5 F | OXYGEN SATURATION: 99 % | SYSTOLIC BLOOD PRESSURE: 120 MMHG | DIASTOLIC BLOOD PRESSURE: 70 MMHG | RESPIRATION RATE: 16 BRPM | WEIGHT: 189 LBS

## 2024-05-01 DIAGNOSIS — D64.9 CHRONIC ANEMIA: ICD-10-CM

## 2024-05-01 DIAGNOSIS — E11.9 TYPE 2 DIABETES MELLITUS WITHOUT COMPLICATION, WITHOUT LONG-TERM CURRENT USE OF INSULIN: Chronic | ICD-10-CM

## 2024-05-01 DIAGNOSIS — Z00.00 ROUTINE PHYSICAL EXAMINATION: Primary | ICD-10-CM

## 2024-05-01 DIAGNOSIS — E66.3 OVERWEIGHT (BMI 25.0-29.9): Chronic | ICD-10-CM

## 2024-05-01 DIAGNOSIS — E78.2 MIXED HYPERLIPIDEMIA: Chronic | ICD-10-CM

## 2024-05-01 DIAGNOSIS — Z86.010 HISTORY OF COLON POLYPS: Chronic | ICD-10-CM

## 2024-05-01 DIAGNOSIS — I10 BENIGN ESSENTIAL HYPERTENSION: Chronic | ICD-10-CM

## 2024-05-01 DIAGNOSIS — E55.9 VITAMIN D DEFICIENCY: Chronic | ICD-10-CM

## 2024-05-01 DIAGNOSIS — Z91.09 MULTIPLE ENVIRONMENTAL ALLERGIES: Chronic | ICD-10-CM

## 2024-05-01 DIAGNOSIS — E03.9 PRIMARY HYPOTHYROIDISM: Chronic | ICD-10-CM

## 2024-05-01 PROCEDURE — 99397 PER PM REEVAL EST PAT 65+ YR: CPT | Performed by: INTERNAL MEDICINE

## 2024-05-01 PROCEDURE — 99214 OFFICE O/P EST MOD 30 MIN: CPT | Performed by: INTERNAL MEDICINE

## 2024-05-01 RX ORDER — LEVOTHYROXINE SODIUM 0.1 MG/1
TABLET ORAL
Qty: 90 TABLET | Refills: 3 | Status: SHIPPED | OUTPATIENT
Start: 2024-05-01

## 2024-05-01 NOTE — PROGRESS NOTES
05/01/2024    Patient Information  Lionel Carson                                                                                          [address]  [address]  [facility] [address]      1955  [unfilled]  There is no work phone number on file.    Chief Complaint:     Routine annual physical examination/preventative visit.  No new acute complaints.    History of Present Illness:    Patient with chronic medical problems as noted below in assessment plan presents today for his routine annual physical/preventative visit.  Patient had lab work in order to monitor his chronic medical issues.  His past medical history reviewed and updated were necessary including health maintenance parameters.  This reveals he still needs a colonoscopy given his history of colon polyps.  He also needs RSV which she can get at the local drugstore.    Review of Systems   Constitutional: Negative.   HENT: Negative.     Eyes: Negative.    Cardiovascular: Negative.    Respiratory: Negative.     Endocrine: Negative.    Hematologic/Lymphatic: Negative.    Skin: Negative.    Musculoskeletal: Negative.    Gastrointestinal: Negative.    Genitourinary: Negative.    Neurological: Negative.    Psychiatric/Behavioral: Negative.     Allergic/Immunologic: Negative.        Active Problems:    Patient Active Problem List   Diagnosis    Allergic rhinitis    Benign essential hypertension    History of colon polyps, 12/18/2018--negative.  01/26/2015--tubular adenoma ×1.  Probable hyperplastic ×1.  Repeat due 3 years.    Diverticulosis of colon    Hyperlipidemia    Primary hypothyroidism    Multiple environmental allergies    Type 2 diabetes mellitus without complication, without long-term current use of insulin    Vitamin D deficiency    Therapeutic drug monitoring    Routine physical examination    Diabetic foot exam    Diabetic eye exam    Chronic anemia    Nuclear cataract    Overweight (BMI 25.0-29.9)         Past Medical History:    Diagnosis Date    Benign essential hypertension 09/07/2007 09/07/2007--initial treatment with lisinopril that was primarily for renal protection.    Chronic anemia 08/03/2017 08/03/2017--routine physical.  Hemoglobin is low at 13.3.  Hematocrit low at 40.0.  Indices are normal.  Repeat CBC, iron studies, homocystine, and methylmalonic acid ordered.  Patient will follow-up on the phone for the results and possible further instructions.  He is aware that it takes 2 weeks for one lab test to return.    Diverticulosis of colon 01/26/2015 01/26/2015--colonoscopy revealed extensive internal and external hemorrhoids. The patient had 2 sessile polyps, one at the splenic flexure and the other in the ascending colon near the hepatic flexure which were removed and sent for pathology. Left colon diverticulosis also noted. The ascending colon polyp returned tubular adenoma. The splenic flexure polyp was markedly cauterized and partially denuded. Focal hyperplastic features. No definite evidence of adenomatous change. Repeat study due 3 years.    History of anemia 02/04/2015 12/06/2015--hemoglobin normal at 13.2, hematocrit normal at 39.5. Resolve this issue.   02/04/2015--homocystine and methylmalonic acid are normal. Serum iron low normal at 62. Iron saturation low at 16%. Colonoscopy ordered and returned one hyperplastic polyp and one tubular adenomatous polyp.   01/25/2015--routine CBC reveals a hemoglobin low at 13.1, hematocrit low at 40.3. Homocystine, methylma    History of colon polyps, 12/18/2018--negative.  01/26/2015--tubular adenoma ×1.  Probable hyperplastic ×1.  Repeat due 3 years. 01/26/2015 12/18/2018--colonoscopy revealed multiple small and large mouth diverticula in the sigmoid and descending colon.  There was evidence of an impacted diverticulum.  No bleeding.  Nonbleeding internal hemorrhoids noted.  Grade 3.  No polyps noted.  No specimens taken.  01/26/2015--colonoscopy revealed extensive  internal and external hemorrhoids. The patient had 2 sessile polyps, one at the splenic fl    Hyperlipidemia 09/07/2007 09/07/2007--initial treatment hyperlipidemia.    Hypothyroidism 06/18/2008 06/18/2008--initial diagnosis and treatment hypothyroidism.    Multiple environmental allergies 05/17/2016    Patient had allergy testing as a child.    Type 2 diabetes mellitus without complication, without long-term current use of insulin 07/17/2006 July 2006--initial diagnosis diabetes.    Vitamin D deficiency 06/16/2016         Past Surgical History:   Procedure Laterality Date    COLONOSCOPY  01/26/2015 01/26/2015--colonoscopy revealed extensive internal and external hemorrhoids. The patient had 2 sessile polyps, one at the splenic flexure and the other in the ascending colon near the hepatic flexure which were removed and sent for pathology. Left colon diverticulosis also noted. The ascending colon polyp returned tubular adenoma. The splenic flexure polyp was markedly cauterized and partially de    COLONOSCOPY N/A 12/18/2018 12/18/2018--colonoscopy revealed multiple small and large mouth diverticula in the sigmoid and descending colon.  There was evidence of an impacted diverticulum.  No bleeding.  Nonbleeding internal hemorrhoids noted.  Grade 3.  No polyps noted.  No specimens taken.         No Known Allergies        Current Outpatient Medications:     aspirin 81 MG EC tablet, Take 1 tablet by mouth Daily., Disp: , Rfl:     Cholecalciferol (VITAMIN D) 2000 UNITS capsule, Take 1 capsule by mouth Daily., Disp: , Rfl:     glyburide (DIAbeta) 5 MG tablet, Take 1 tablet (5 mg) at breakfast and supper for diabetes, Disp: 180 tablet, Rfl: 2    lisinopril (PRINIVIL,ZESTRIL) 10 MG tablet, Take 1 tablet (10 mg) every morning for high blood pressure and renal protection from diabetes, Disp: 90 tablet, Rfl: 2    Semaglutide, 2 MG/DOSE, (Ozempic, 2 MG/DOSE,) 8 MG/3ML solution pen-injector, Inject 2 mg  "subcutaneously weekly as directed for diabetes, Disp: 9 mL, Rfl: 3    simvastatin (ZOCOR) 40 MG tablet, Take 1 tablet (40 mg) every day for high cholesterol, Disp: 90 tablet, Rfl: 2    SITagliptin-metFORMIN HCl ER (Janumet XR)  MG tablet, Take 1 tablet ( mg) twice daily at breakfast and supper for diabetes, Disp: 180 tablet, Rfl: 2    tadalafil (CIALIS) 5 MG tablet, Take 1 tablet daily (5 mg) for prostate, Disp: 90 tablet, Rfl: 2    levothyroxine (Synthroid) 100 MCG tablet, Take 1 tablet (100 mcg) daily for low thyroid., Disp: 90 tablet, Rfl: 3      Family History   Problem Relation Age of Onset    Diabetes Mother         Type 2         Social History     Socioeconomic History    Marital status:     Number of children: 0    Highest education level: Bachelor's degree (e.g., BA, AB, BS)   Tobacco Use    Smoking status: Never    Smokeless tobacco: Never   Vaping Use    Vaping status: Never Used   Substance and Sexual Activity    Alcohol use: No    Drug use: No    Sexual activity: Yes     Partners: Female         Vitals:    05/01/24 0729   BP: 120/70   Pulse: 80   Resp: 16   Temp: 97.5 °F (36.4 °C)   TempSrc: Temporal   SpO2: 99%   Weight: 85.7 kg (189 lb)   Height: 177.8 cm (70\")        Body mass index is 27.12 kg/m².      Physical Exam:    General: Alert and oriented x 3.  No acute distress.  Normal affect.  Mildly overweight.  HEENT: Pupils equal, round, reactive to light; extraocular movements intact; sclerae nonicteric; pharynx, ear canals and TMs normal.  Neck: Without JVD, thyromegaly, bruit, or adenopathy.  Lungs: Clear to auscultation in all fields.  Heart: Regular rate and rhythm without murmur, rub, gallop, or click.  Abdomen: Soft, nontender, without hepatosplenomegaly or hernia.  Bowel sounds normal.  : Deferred.  Rectal: Deferred.  Extremities: Without clubbing, cyanosis, edema, or pulse deficit.  Neurologic: Intact without focal deficit.  Normal station and gait observed during " ingress and egress from the examination room.  Skin: Without significant lesion.  Musculoskeletal: Unremarkable.    Lab/other results:    CBC normal except hemoglobin low at 11.5 and hematocrit low at 35.1.  CPK normal.  CMP normal except glucose 112, chloride 108, CO2 21.2.  Hemoglobin A1c 6.9.  Total cholesterol 104, triglycerides 99, LDL particle #412, HDL particle #32.6.  TSH is suppressed at 0.031.  Free T4 is elevated at 2.03.  Free T3 is normal at 2.99.  Vitamin D normal at 69.3.    Assessment/Plan:     Diagnosis Plan   1. Routine physical examination  PSA DIAGNOSTIC      2. Type 2 diabetes mellitus without complication, without long-term current use of insulin  Comprehensive Metabolic Panel    Microalbumin / Creatinine Urine Ratio - Urine, Clean Catch      3. Primary hypothyroidism  TSH    T4, Free    T3, Free    levothyroxine (Synthroid) 100 MCG tablet      4. Hyperlipidemia  Comprehensive Metabolic Panel      5. Vitamin D deficiency        6. Benign essential hypertension        7. History of colon polyps, 12/18/2018--negative.  01/26/2015--tubular adenoma ×1.  Probable hyperplastic ×1.  Repeat due 3 years.  Ambulatory Referral For Screening Colonoscopy      8. Multiple environmental allergies        9. Overweight (BMI 25.0-29.9)        10. Chronic anemia  Iron Profile    WALKER + PE    Methylmalonic Acid, Serum    Homocysteine    CBC (No Diff)        Patient presents for his routine annual physical/preventative visit and seems to be doing well except for the following issues: He has type 2 diabetes is under excellent control with Ozempic.  His TSH is markedly suppressed and thyroid medication adjustment is indicated.  Hyperlipidemia is under excellent control.  Blood pressure appears to be controlled.  Patient has a history of colon polyps and needs a colonoscopy.  This is particularly true given the fact that he has developed an anemia.  This needs further evaluation as well.  Environmental allergies seem  controlled at present time.    Several preventative health issues discussed including review of vaccinations and recommendations, including dietary issues, exercise and weight loss.  Safe sex practices discussed.  Patient advised to wear seatbelt whenever driving and avoid texting and driving.  Also advised to look both ways before crossing the street.  Patient is not up-to-date on his colon cancer screening..  Advised to avoid tobacco products and minimize alcohol consumption.    Plan is as follows: Anemia workup labs today.  PSA today.  Colonoscopy referral ASAP.  Decrease levothyroxine to 100 mcg/day.  Patient will obtain lab work and follow-up in 6 weeks to reassess.      Procedures

## 2024-05-06 LAB
ALBUMIN SERPL ELPH-MCNC: 3.8 G/DL (ref 2.9–4.4)
ALBUMIN/GLOB SERPL: 1.4 {RATIO} (ref 0.7–1.7)
ALPHA1 GLOB SERPL ELPH-MCNC: 0.2 G/DL (ref 0–0.4)
ALPHA2 GLOB SERPL ELPH-MCNC: 0.6 G/DL (ref 0.4–1)
B-GLOBULIN SERPL ELPH-MCNC: 1 G/DL (ref 0.7–1.3)
GAMMA GLOB SERPL ELPH-MCNC: 1 G/DL (ref 0.4–1.8)
GLOBULIN SER-MCNC: 2.9 G/DL (ref 2.2–3.9)
HCYS SERPL-SCNC: 15.6 UMOL/L (ref 0–17.2)
IGA SERPL-MCNC: 366 MG/DL (ref 61–437)
IGG SERPL-MCNC: 1119 MG/DL (ref 603–1613)
IGM SERPL-MCNC: 114 MG/DL (ref 20–172)
INTERPRETATION SERPL IEP-IMP: NORMAL
IRON SATN MFR SERPL: 24 % (ref 15–55)
IRON SERPL-MCNC: 78 UG/DL (ref 38–169)
LABORATORY COMMENT REPORT: NORMAL
M PROTEIN SERPL ELPH-MCNC: NORMAL G/DL
METHYLMALONATE SERPL-SCNC: 164 NMOL/L (ref 0–378)
PROT SERPL-MCNC: 6.7 G/DL (ref 6–8.5)
PSA SERPL-MCNC: 0.2 NG/ML (ref 0–4)
TIBC SERPL-MCNC: 330 UG/DL (ref 250–450)
UIBC SERPL-MCNC: 252 UG/DL (ref 111–343)

## 2024-05-22 ENCOUNTER — PREP FOR SURGERY (OUTPATIENT)
Dept: OTHER | Facility: HOSPITAL | Age: 69
End: 2024-05-22
Payer: COMMERCIAL

## 2024-05-22 DIAGNOSIS — Z86.010 HISTORY OF ADENOMATOUS POLYP OF COLON: Primary | ICD-10-CM

## 2024-06-19 ENCOUNTER — OFFICE VISIT (OUTPATIENT)
Dept: INTERNAL MEDICINE | Facility: CLINIC | Age: 69
End: 2024-06-19
Payer: COMMERCIAL

## 2024-06-19 VITALS
DIASTOLIC BLOOD PRESSURE: 70 MMHG | SYSTOLIC BLOOD PRESSURE: 118 MMHG | HEIGHT: 70 IN | OXYGEN SATURATION: 96 % | HEART RATE: 73 BPM | RESPIRATION RATE: 16 BRPM | TEMPERATURE: 96.6 F | WEIGHT: 196 LBS | BODY MASS INDEX: 28.06 KG/M2

## 2024-06-19 DIAGNOSIS — E03.9 PRIMARY HYPOTHYROIDISM: Chronic | ICD-10-CM

## 2024-06-19 DIAGNOSIS — E78.2 MIXED HYPERLIPIDEMIA: Chronic | ICD-10-CM

## 2024-06-19 DIAGNOSIS — Z91.09 MULTIPLE ENVIRONMENTAL ALLERGIES: Chronic | ICD-10-CM

## 2024-06-19 DIAGNOSIS — I10 BENIGN ESSENTIAL HYPERTENSION: Chronic | ICD-10-CM

## 2024-06-19 DIAGNOSIS — Z51.81 THERAPEUTIC DRUG MONITORING: ICD-10-CM

## 2024-06-19 DIAGNOSIS — E66.3 OVERWEIGHT (BMI 25.0-29.9): Chronic | ICD-10-CM

## 2024-06-19 DIAGNOSIS — D64.9 CHRONIC ANEMIA: ICD-10-CM

## 2024-06-19 DIAGNOSIS — Z86.010 HISTORY OF COLON POLYPS: Chronic | ICD-10-CM

## 2024-06-19 DIAGNOSIS — E55.9 VITAMIN D DEFICIENCY: Chronic | ICD-10-CM

## 2024-06-19 DIAGNOSIS — E11.9 TYPE 2 DIABETES MELLITUS WITHOUT COMPLICATION, WITHOUT LONG-TERM CURRENT USE OF INSULIN: Primary | Chronic | ICD-10-CM

## 2024-06-19 PROCEDURE — 99214 OFFICE O/P EST MOD 30 MIN: CPT | Performed by: INTERNAL MEDICINE

## 2024-06-19 NOTE — PROGRESS NOTES
06/19/2024    Patient Information  Lionel Carson                                                                                          [address]  [address]  [facility] [address]      1955  [unfilled]  There is no work phone number on file.    Chief Complaint:     Follow-up chronic medical problems as noted below and recent lab work.    History of Present Illness:    Patient with a history of several chronic medical problems including type 2 diabetes and anemia as well as other chronic problems listed below in the assessment and plan presents today for follow-up with blood work prior in order to monitor these issues.  I saw him about 6 weeks ago at which time his TSH was significantly suppressed and we decreased his levothyroxine from 125 mcg/day down to 100 mcg/day.  Patient is on Janumet 50/1000 twice a day along with Ozempic 2 mg subcutaneously weekly and glyburide 5 mg twice daily at breakfast and supper for his diabetes.  He takes simvastatin 40 mg for his cholesterol.  He is also on vitamin D supplementation.  Blood pressure treated with lisinopril 10 mg/day.  Past medical history reviewed and updated were necessary including health maintenance parameters.  This reveals he is updated or else accounted for with the exception of colonoscopy which I ordered at the last visit.  See below.    Review of Systems   Constitutional: Negative.   HENT: Negative.     Eyes: Negative.    Cardiovascular: Negative.    Respiratory: Negative.     Endocrine: Negative.    Hematologic/Lymphatic: Negative.    Skin: Negative.    Musculoskeletal: Negative.    Gastrointestinal: Negative.    Genitourinary: Negative.    Neurological: Negative.    Psychiatric/Behavioral: Negative.     Allergic/Immunologic: Negative.        Active Problems:    Patient Active Problem List   Diagnosis    Allergic rhinitis    Benign essential hypertension    History of colon polyps, 12/18/2018--negative.  01/26/2015--tubular adenoma  ×1.  Probable hyperplastic ×1.  Repeat due 3 years.    Diverticulosis of colon    Hyperlipidemia    Primary hypothyroidism    Multiple environmental allergies    Type 2 diabetes mellitus without complication, without long-term current use of insulin    Vitamin D deficiency    Therapeutic drug monitoring    Routine physical examination    Diabetic foot exam    Diabetic eye exam    Chronic anemia    Nuclear cataract    Overweight (BMI 25.0-29.9)         Past Medical History:   Diagnosis Date    Benign essential hypertension 09/07/2007 09/07/2007--initial treatment with lisinopril that was primarily for renal protection.    Chronic anemia 08/03/2017 08/03/2017--routine physical.  Hemoglobin is low at 13.3.  Hematocrit low at 40.0.  Indices are normal.  Repeat CBC, iron studies, homocystine, and methylmalonic acid ordered.  Patient will follow-up on the phone for the results and possible further instructions.  He is aware that it takes 2 weeks for one lab test to return.    Diverticulosis of colon 01/26/2015 01/26/2015--colonoscopy revealed extensive internal and external hemorrhoids. The patient had 2 sessile polyps, one at the splenic flexure and the other in the ascending colon near the hepatic flexure which were removed and sent for pathology. Left colon diverticulosis also noted. The ascending colon polyp returned tubular adenoma. The splenic flexure polyp was markedly cauterized and partially denuded. Focal hyperplastic features. No definite evidence of adenomatous change. Repeat study due 3 years.    History of anemia 02/04/2015 12/06/2015--hemoglobin normal at 13.2, hematocrit normal at 39.5. Resolve this issue.   02/04/2015--homocystine and methylmalonic acid are normal. Serum iron low normal at 62. Iron saturation low at 16%. Colonoscopy ordered and returned one hyperplastic polyp and one tubular adenomatous polyp.   01/25/2015--routine CBC reveals a hemoglobin low at 13.1, hematocrit low at 40.3.  Homocystine, methylma    History of colon polyps, 12/18/2018--negative.  01/26/2015--tubular adenoma ×1.  Probable hyperplastic ×1.  Repeat due 3 years. 01/26/2015 12/18/2018--colonoscopy revealed multiple small and large mouth diverticula in the sigmoid and descending colon.  There was evidence of an impacted diverticulum.  No bleeding.  Nonbleeding internal hemorrhoids noted.  Grade 3.  No polyps noted.  No specimens taken.  01/26/2015--colonoscopy revealed extensive internal and external hemorrhoids. The patient had 2 sessile polyps, one at the splenic fl    Hyperlipidemia 09/07/2007 09/07/2007--initial treatment hyperlipidemia.    Hypothyroidism 06/18/2008 06/18/2008--initial diagnosis and treatment hypothyroidism.    Multiple environmental allergies 05/17/2016    Patient had allergy testing as a child.    Type 2 diabetes mellitus without complication, without long-term current use of insulin 07/17/2006 July 2006--initial diagnosis diabetes.    Vitamin D deficiency 06/16/2016         Past Surgical History:   Procedure Laterality Date    COLONOSCOPY  01/26/2015 01/26/2015--colonoscopy revealed extensive internal and external hemorrhoids. The patient had 2 sessile polyps, one at the splenic flexure and the other in the ascending colon near the hepatic flexure which were removed and sent for pathology. Left colon diverticulosis also noted. The ascending colon polyp returned tubular adenoma. The splenic flexure polyp was markedly cauterized and partially de    COLONOSCOPY N/A 12/18/2018 12/18/2018--colonoscopy revealed multiple small and large mouth diverticula in the sigmoid and descending colon.  There was evidence of an impacted diverticulum.  No bleeding.  Nonbleeding internal hemorrhoids noted.  Grade 3.  No polyps noted.  No specimens taken.         No Known Allergies        Current Outpatient Medications:     aspirin 81 MG EC tablet, Take 1 tablet by mouth Daily., Disp: , Rfl:      "Cholecalciferol (VITAMIN D) 2000 UNITS capsule, Take 1 capsule by mouth Daily., Disp: , Rfl:     glyburide (DIAbeta) 5 MG tablet, Take 1 tablet (5 mg) at breakfast and supper for diabetes, Disp: 180 tablet, Rfl: 2    levothyroxine (Synthroid) 100 MCG tablet, Take 1 tablet (100 mcg) daily for low thyroid., Disp: 90 tablet, Rfl: 3    lisinopril (PRINIVIL,ZESTRIL) 10 MG tablet, Take 1 tablet (10 mg) every morning for high blood pressure and renal protection from diabetes, Disp: 90 tablet, Rfl: 2    Semaglutide, 2 MG/DOSE, (Ozempic, 2 MG/DOSE,) 8 MG/3ML solution pen-injector, Inject 2 mg subcutaneously weekly as directed for diabetes, Disp: 9 mL, Rfl: 3    simvastatin (ZOCOR) 40 MG tablet, Take 1 tablet (40 mg) every day for high cholesterol, Disp: 90 tablet, Rfl: 2    SITagliptin-metFORMIN HCl ER (Janumet XR)  MG tablet, Take 1 tablet ( mg) twice daily at breakfast and supper for diabetes, Disp: 180 tablet, Rfl: 2    tadalafil (CIALIS) 5 MG tablet, Take 1 tablet daily (5 mg) for prostate, Disp: 90 tablet, Rfl: 2      Family History   Problem Relation Age of Onset    Diabetes Mother         Type 2         Social History     Socioeconomic History    Marital status:     Number of children: 0    Highest education level: Bachelor's degree (e.g., BA, AB, BS)   Tobacco Use    Smoking status: Never    Smokeless tobacco: Never   Vaping Use    Vaping status: Never Used   Substance and Sexual Activity    Alcohol use: No    Drug use: No    Sexual activity: Yes     Partners: Female         Vitals:    06/19/24 0724   BP: 118/70   Pulse: 73   Resp: 16   Temp: 96.6 °F (35.9 °C)   TempSrc: Temporal   SpO2: 96%   Weight: 88.9 kg (196 lb)   Height: 177.8 cm (70\")        Body mass index is 28.12 kg/m².      Physical Exam:    General: Alert and oriented x 3.  No acute distress.  Overweight.  Normal affect.  HEENT: Pupils equal, round, reactive to light; extraocular movements intact; sclerae nonicteric; pharynx, ear " canals and TMs normal.  Neck: Without JVD, thyromegaly, bruit, or adenopathy.  Lungs: Clear to auscultation in all fields.  Heart: Regular rate and rhythm without murmur, rub, gallop, or click.  Abdomen: Soft, nontender, without hepatosplenomegaly or hernia.  Bowel sounds normal.  : Deferred.  Rectal: Deferred.  Extremities: Without clubbing, cyanosis, edema, or pulse deficit.  Neurologic: Intact without focal deficit.  Normal station and gait observed during ingress and egress from the examination room.  Skin: Without significant lesion.  Musculoskeletal: Unremarkable.    Lab/other results:    Thyroid function tests are entirely normal.  Hemoglobin is low at 12.1 and hematocrit low at 36.8.  Indices are normal and so are his platelets.  CMP normal except glucose 168.  Microalbumin/creatinine ratio 5.  Hemoglobin A1c 6.9 back in April.  Homocystine normal at 15.6.  Methylmalonic acid normal at 164.  Immunofixation is normal.  Iron studies are normal.  PSA normal at 0.2.  Previous vitamin D level normal at 69.3.      Assessment/Plan:     Diagnosis Plan   1. Type 2 diabetes mellitus without complication, without long-term current use of insulin  Hemoglobin A1c      2. Primary hypothyroidism  TSH    T4, Free    T3, Free      3. Chronic anemia  CBC (No Diff)      4. Benign essential hypertension  Comprehensive Metabolic Panel      5. Vitamin D deficiency  Vitamin D,25-Hydroxy      6. Overweight (BMI 25.0-29.9)        7. History of colon polyps, 12/18/2018--negative.  01/26/2015--tubular adenoma ×1.  Probable hyperplastic ×1.  Repeat due 3 years.        8. Multiple environmental allergies        9. Therapeutic drug monitoring        10. Hyperlipidemia  CK    NMR LipoProfile        Patient has type 2 diabetes just under reasonable control on the current regimen.  His thyroid is now therapeutic and is not supratherapeutic after medication adjustment.  Chronic anemia is stable and actually improved.  There is no evidence  of vitamin B12 deficiency, folic acid deficiency, or iron deficiency.  Will continue to monitor.  Hypertension well-controlled with lisinopril.  Patient is overweight and I strongly encouraged low carbohydrate diet, exercise, and weight loss.  Patient has a history of colon polyps and a previous order for colonoscopy was placed.  I will check on why that has not been set up.  Environmental allergies are stable.  Vitamin D therapeutic on the current regimen.    Plan is as follows: Diet and exercise as discussed along with weight loss.  No changes in current medical regimen.  I will have patient follow-up in about 5 months with lab prior or follow-up as needed.        Procedures

## 2024-06-24 PROBLEM — Z86.010 HISTORY OF ADENOMATOUS POLYP OF COLON: Status: ACTIVE | Noted: 2024-05-22

## 2024-06-24 PROBLEM — Z86.0101 HISTORY OF ADENOMATOUS POLYP OF COLON: Status: ACTIVE | Noted: 2024-05-22

## 2024-08-06 ENCOUNTER — HOSPITAL ENCOUNTER (OUTPATIENT)
Facility: HOSPITAL | Age: 69
Setting detail: HOSPITAL OUTPATIENT SURGERY
Discharge: HOME | End: 2024-08-06
Attending: SURGERY | Admitting: SURGERY
Payer: COMMERCIAL

## 2024-08-06 ENCOUNTER — ANESTHESIA (OUTPATIENT)
Dept: GASTROENTEROLOGY | Facility: HOSPITAL | Age: 69
End: 2024-08-06
Payer: COMMERCIAL

## 2024-08-06 ENCOUNTER — ANESTHESIA EVENT (OUTPATIENT)
Dept: GASTROENTEROLOGY | Facility: HOSPITAL | Age: 69
End: 2024-08-06
Payer: COMMERCIAL

## 2024-08-06 VITALS
HEIGHT: 72 IN | BODY MASS INDEX: 26.55 KG/M2 | WEIGHT: 196 LBS | HEART RATE: 74 BPM | OXYGEN SATURATION: 94 % | DIASTOLIC BLOOD PRESSURE: 79 MMHG | RESPIRATION RATE: 15 BRPM | SYSTOLIC BLOOD PRESSURE: 117 MMHG

## 2024-08-06 DIAGNOSIS — Z86.010 HISTORY OF ADENOMATOUS POLYP OF COLON: ICD-10-CM

## 2024-08-06 PROBLEM — K64.8 INTERNAL HEMORRHOIDS: Status: ACTIVE | Noted: 2024-08-06

## 2024-08-06 LAB — GLUCOSE BLDC GLUCOMTR-MCNC: 256 MG/DL (ref 70–130)

## 2024-08-06 PROCEDURE — S0260 H&P FOR SURGERY: HCPCS | Performed by: SURGERY

## 2024-08-06 PROCEDURE — 82948 REAGENT STRIP/BLOOD GLUCOSE: CPT

## 2024-08-06 PROCEDURE — 25810000003 LACTATED RINGERS PER 1000 ML: Performed by: SURGERY

## 2024-08-06 PROCEDURE — 25010000002 PROPOFOL 10 MG/ML EMULSION: Performed by: NURSE ANESTHETIST, CERTIFIED REGISTERED

## 2024-08-06 PROCEDURE — 88305 TISSUE EXAM BY PATHOLOGIST: CPT | Performed by: SURGERY

## 2024-08-06 RX ORDER — SODIUM CHLORIDE 0.9 % (FLUSH) 0.9 %
10 SYRINGE (ML) INJECTION AS NEEDED
Status: DISCONTINUED | OUTPATIENT
Start: 2024-08-06 | End: 2024-08-06 | Stop reason: HOSPADM

## 2024-08-06 RX ORDER — LIDOCAINE HYDROCHLORIDE 20 MG/ML
INJECTION, SOLUTION INFILTRATION; PERINEURAL AS NEEDED
Status: DISCONTINUED | OUTPATIENT
Start: 2024-08-06 | End: 2024-08-06 | Stop reason: SURG

## 2024-08-06 RX ORDER — PROPOFOL 10 MG/ML
VIAL (ML) INTRAVENOUS AS NEEDED
Status: DISCONTINUED | OUTPATIENT
Start: 2024-08-06 | End: 2024-08-06 | Stop reason: SURG

## 2024-08-06 RX ORDER — SODIUM CHLORIDE, SODIUM LACTATE, POTASSIUM CHLORIDE, CALCIUM CHLORIDE 600; 310; 30; 20 MG/100ML; MG/100ML; MG/100ML; MG/100ML
1000 INJECTION, SOLUTION INTRAVENOUS CONTINUOUS
Status: DISCONTINUED | OUTPATIENT
Start: 2024-08-06 | End: 2024-08-06 | Stop reason: HOSPADM

## 2024-08-06 RX ADMIN — SODIUM CHLORIDE, POTASSIUM CHLORIDE, SODIUM LACTATE AND CALCIUM CHLORIDE: 600; 310; 30; 20 INJECTION, SOLUTION INTRAVENOUS at 09:39

## 2024-08-06 RX ADMIN — PROPOFOL 180 MCG/KG/MIN: 10 INJECTION, EMULSION INTRAVENOUS at 09:20

## 2024-08-06 RX ADMIN — PROPOFOL 50 MG: 10 INJECTION, EMULSION INTRAVENOUS at 09:19

## 2024-08-06 RX ADMIN — SODIUM CHLORIDE, POTASSIUM CHLORIDE, SODIUM LACTATE AND CALCIUM CHLORIDE 1000 ML: 600; 310; 30; 20 INJECTION, SOLUTION INTRAVENOUS at 09:07

## 2024-08-06 RX ADMIN — LIDOCAINE HYDROCHLORIDE 60 MG: 20 INJECTION, SOLUTION INFILTRATION; PERINEURAL at 09:19

## 2024-08-06 NOTE — ANESTHESIA PREPROCEDURE EVALUATION
Anesthesia Evaluation     Patient summary reviewed and Nursing notes reviewed                Airway   Mallampati: III  Dental    (+) poor dentition        Pulmonary - negative pulmonary ROS   Cardiovascular     Rhythm: regular  Rate: normal    (+) hypertension, hyperlipidemia      Neuro/Psych- negative ROS  GI/Hepatic/Renal/Endo    (+) obesity, diabetes mellitus type 2, thyroid problem hypothyroidism    Musculoskeletal (-) negative ROS    Abdominal    Substance History - negative use     OB/GYN negative ob/gyn ROS         Other                    Anesthesia Plan    ASA 3     MAC     (Ozempic last injected 7/24/24  Teeth are in poor condition)  intravenous induction     Anesthetic plan, risks, benefits, and alternatives have been provided, discussed and informed consent has been obtained with: patient.    CODE STATUS:

## 2024-08-06 NOTE — ANESTHESIA POSTPROCEDURE EVALUATION
Patient: Lionel Carson    Procedure Summary       Date: 08/06/24 Room / Location: [facility] ENDOSCOPY 4 / [facility] ENDOSCOPY    Anesthesia Start: 0917 Anesthesia Stop: 0943    Procedure: COLONOSCOPY INTO CECUM/ TERMINAL ILEUM WITH POLYPECTOMY Diagnosis:       History of adenomatous polyp of colon      (History of adenomatous polyp of colon [Z86.010])    Surgeons: Kuldeep Chambers MD Provider: Rito Nguyen MD    Anesthesia Type: MAC ASA Status: 3            Anesthesia Type: MAC    Vitals  Vitals Value Taken Time   /79 08/06/24 1003   Temp     Pulse 73 08/06/24 1006   Resp 15 08/06/24 1003   SpO2 90 % 08/06/24 1006   Vitals shown include unfiled device data.        Post Anesthesia Care and Evaluation    Patient location during evaluation: PACU  Patient participation: complete - patient participated  Level of consciousness: awake and alert  Pain management: adequate    Airway patency: patent  Anesthetic complications: No anesthetic complications    Cardiovascular status: acceptable  Respiratory status: acceptable  Hydration status: acceptable    Comments: --------------------            08/06/24               1003     --------------------   BP:       117/79     Pulse:      74       Resp:       15       SpO2:      94%      --------------------

## 2024-08-06 NOTE — H&P
Reason for Visit: Surveillance colonoscopy    HPI:  Patient presents for evaluation for colon cancer surveillance.  There is no family history of colon neoplasia. No family hx of gastric, ovarian, or uterine cancer.  Patient denies changes in bowel habits, diarrhea, constipation, abdominal pain, decreased caliber of stool, melena, brbpr, and weight loss.  There is no personal or family history of bleeding disorder or untoward reaction to anesthesia.  Patient has had a colonoscopy 6 year(s) ago.      Past Medical History:   Diagnosis Date    Benign essential hypertension 09/07/2007 09/07/2007--initial treatment with lisinopril that was primarily for renal protection.    Chronic anemia 08/03/2017 08/03/2017--routine physical.  Hemoglobin is low at 13.3.  Hematocrit low at 40.0.  Indices are normal.  Repeat CBC, iron studies, homocystine, and methylmalonic acid ordered.  Patient will follow-up on the phone for the results and possible further instructions.  He is aware that it takes 2 weeks for one lab test to return.    Diverticulosis of colon 01/26/2015 01/26/2015--colonoscopy revealed extensive internal and external hemorrhoids. The patient had 2 sessile polyps, one at the splenic flexure and the other in the ascending colon near the hepatic flexure which were removed and sent for pathology. Left colon diverticulosis also noted. The ascending colon polyp returned tubular adenoma. The splenic flexure polyp was markedly cauterized and partially denuded. Focal hyperplastic features. No definite evidence of adenomatous change. Repeat study due 3 years.    History of anemia 02/04/2015 12/06/2015--hemoglobin normal at 13.2, hematocrit normal at 39.5. Resolve this issue.   02/04/2015--homocystine and methylmalonic acid are normal. Serum iron low normal at 62. Iron saturation low at 16%. Colonoscopy ordered and returned one hyperplastic polyp and one tubular adenomatous polyp.   01/25/2015--routine CBC reveals a  hemoglobin low at 13.1, hematocrit low at 40.3. Homocystine, methylma    History of colon polyps, 12/18/2018--negative.  01/26/2015--tubular adenoma ×1.  Probable hyperplastic ×1.  Repeat due 3 years. 01/26/2015 12/18/2018--colonoscopy revealed multiple small and large mouth diverticula in the sigmoid and descending colon.  There was evidence of an impacted diverticulum.  No bleeding.  Nonbleeding internal hemorrhoids noted.  Grade 3.  No polyps noted.  No specimens taken.  01/26/2015--colonoscopy revealed extensive internal and external hemorrhoids. The patient had 2 sessile polyps, one at the splenic fl    Hyperlipidemia 09/07/2007 09/07/2007--initial treatment hyperlipidemia.    Hypothyroidism 06/18/2008 06/18/2008--initial diagnosis and treatment hypothyroidism.    Multiple environmental allergies 05/17/2016    Patient had allergy testing as a child.    Type 2 diabetes mellitus without complication, without long-term current use of insulin 07/17/2006 July 2006--initial diagnosis diabetes.    Vitamin D deficiency 06/16/2016       Past Surgical History:   Procedure Laterality Date    COLONOSCOPY  01/26/2015 01/26/2015--colonoscopy revealed extensive internal and external hemorrhoids. The patient had 2 sessile polyps, one at the splenic flexure and the other in the ascending colon near the hepatic flexure which were removed and sent for pathology. Left colon diverticulosis also noted. The ascending colon polyp returned tubular adenoma. The splenic flexure polyp was markedly cauterized and partially de    COLONOSCOPY N/A 12/18/2018 12/18/2018--colonoscopy revealed multiple small and large mouth diverticula in the sigmoid and descending colon.  There was evidence of an impacted diverticulum.  No bleeding.  Nonbleeding internal hemorrhoids noted.  Grade 3.  No polyps noted.  No specimens taken.         Current Facility-Administered Medications:     lactated ringers infusion 1,000 mL, 1,000 mL,  "Intravenous, Continuous, Kuldeep Chambers MD, Last Rate: 25 mL/hr at 08/06/24 0917, Currently Infusing at 08/06/24 0917    sodium chloride 0.9 % flush 10 mL, 10 mL, Intravenous, PRN, Kuldeep Chambers MD    No Known Allergies    Family History   Problem Relation Age of Onset    Diabetes Mother         Type 2    Malig Hyperthermia Neg Hx        Social History     Socioeconomic History    Marital status:     Number of children: 0    Highest education level: Bachelor's degree (e.g., BA, AB, BS)   Tobacco Use    Smoking status: Never    Smokeless tobacco: Never   Vaping Use    Vaping status: Never Used   Substance and Sexual Activity    Alcohol use: No    Drug use: No    Sexual activity: Yes     Partners: Female        Review Of Systems:  A comprehensive review of systems was negative.    Objective:   Physical exam:  /70 (BP Location: Left arm, Patient Position: Lying)   Pulse 79   Resp 16   Ht 182.9 cm (72\")   Wt 88.9 kg (196 lb)   SpO2 96%   BMI 26.58 kg/m²     General Appearance:  awake, alert, oriented, in no acute distress  Lungs:  Breathing Pattern: regular, no distress  Heart:  Heart regular rate and rhythm  Abdomen:  Soft, non-tender, normal bowel sounds; no bruits, organomegaly or masses.           Assessment:    Lionel Carson is a 69 y.o. male who presents for evaluation for colon cancer surveillance.    Patient has increased risk factors or warning signs or symptoms.  I reviewed current ACG guidelines for colon cancer screening:    A)  Flex sig q 5yrs with yearly fecal occult blood testing  B)  Virtual colonoscopy  C)  Colonoscopy with possible biopsy/polypectomy with IV sedation at appropriate       screening intervals    The patient has no family history of colon cancer.  He  has a personal history of colon polyp who presents for colon cancer surveillance evaluation.   I would advise a colonscopy.     The rationale for each option as well as all risks and benefits of " each alternative were discussed with the patient in detail.  The patient understands and does wish to proceed with Colonoscopy Screening        The rationale for colonoscopy with possible biopsy, possible polypectomy, with IV sedation as well as all risks, benefits, and alternatives were discussed with the patient in detail. Risks including but not limited to perforation, bleeding,need for blood transfusion or emergent surgery ,and missed neoplasm were reviewed in detail with the patient The patient demonstrates full understanding and wishes to proceed with the colonoscopy.

## 2024-08-07 LAB
LAB AP CASE REPORT: NORMAL
PATH REPORT.FINAL DX SPEC: NORMAL
PATH REPORT.GROSS SPEC: NORMAL

## 2024-08-12 ENCOUNTER — TELEPHONE (OUTPATIENT)
Dept: SURGERY | Facility: CLINIC | Age: 69
End: 2024-08-12
Payer: COMMERCIAL

## 2024-08-12 NOTE — TELEPHONE ENCOUNTER
----- Message from Kuldeep Chambers sent at 8/7/2024  3:34 PM EDT -----  Please call the patient with c-scope results. PIC c-scope in 5 years, thx

## 2024-11-03 DIAGNOSIS — E11.9 TYPE 2 DIABETES MELLITUS WITHOUT COMPLICATION, WITHOUT LONG-TERM CURRENT USE OF INSULIN: Chronic | ICD-10-CM

## 2024-11-04 RX ORDER — SEMAGLUTIDE 2.68 MG/ML
INJECTION, SOLUTION SUBCUTANEOUS
Qty: 9 ML | Refills: 3 | Status: SHIPPED | OUTPATIENT
Start: 2024-11-04 | End: 2024-11-05 | Stop reason: SDUPTHER

## 2024-11-05 DIAGNOSIS — E11.9 TYPE 2 DIABETES MELLITUS WITHOUT COMPLICATION, WITHOUT LONG-TERM CURRENT USE OF INSULIN: Chronic | ICD-10-CM

## 2024-11-06 RX ORDER — SEMAGLUTIDE 2.68 MG/ML
INJECTION, SOLUTION SUBCUTANEOUS
Qty: 9 ML | Refills: 3 | Status: SHIPPED | OUTPATIENT
Start: 2024-11-06

## 2024-11-26 ENCOUNTER — OFFICE VISIT (OUTPATIENT)
Dept: INTERNAL MEDICINE | Facility: CLINIC | Age: 69
End: 2024-11-26
Payer: COMMERCIAL

## 2024-11-26 VITALS
HEART RATE: 73 BPM | RESPIRATION RATE: 16 BRPM | TEMPERATURE: 98.1 F | HEIGHT: 72 IN | DIASTOLIC BLOOD PRESSURE: 70 MMHG | WEIGHT: 197 LBS | OXYGEN SATURATION: 97 % | BODY MASS INDEX: 26.68 KG/M2 | SYSTOLIC BLOOD PRESSURE: 126 MMHG

## 2024-11-26 DIAGNOSIS — E55.9 VITAMIN D DEFICIENCY: Chronic | ICD-10-CM

## 2024-11-26 DIAGNOSIS — I10 BENIGN ESSENTIAL HYPERTENSION: Chronic | ICD-10-CM

## 2024-11-26 DIAGNOSIS — Z91.09 MULTIPLE ENVIRONMENTAL ALLERGIES: Chronic | ICD-10-CM

## 2024-11-26 DIAGNOSIS — N40.1 BENIGN PROSTATIC HYPERPLASIA WITH HESITANCY: ICD-10-CM

## 2024-11-26 DIAGNOSIS — E11.9 TYPE 2 DIABETES MELLITUS WITHOUT COMPLICATION, WITHOUT LONG-TERM CURRENT USE OF INSULIN: Primary | Chronic | ICD-10-CM

## 2024-11-26 DIAGNOSIS — D64.9 CHRONIC ANEMIA: ICD-10-CM

## 2024-11-26 DIAGNOSIS — Z86.0100 HISTORY OF COLON POLYPS: Chronic | ICD-10-CM

## 2024-11-26 DIAGNOSIS — E11.9 ENCOUNTER FOR DIABETIC FOOT EXAM: Chronic | ICD-10-CM

## 2024-11-26 DIAGNOSIS — R39.11 BENIGN PROSTATIC HYPERPLASIA WITH HESITANCY: ICD-10-CM

## 2024-11-26 DIAGNOSIS — Z23 NEED FOR INFLUENZA VACCINATION: ICD-10-CM

## 2024-11-26 DIAGNOSIS — E66.3 OVERWEIGHT (BMI 25.0-29.9): Chronic | ICD-10-CM

## 2024-11-26 DIAGNOSIS — E78.2 MIXED HYPERLIPIDEMIA: Chronic | ICD-10-CM

## 2024-11-26 DIAGNOSIS — Z00.00 ROUTINE PHYSICAL EXAMINATION: ICD-10-CM

## 2024-11-26 DIAGNOSIS — E03.9 PRIMARY HYPOTHYROIDISM: Chronic | ICD-10-CM

## 2024-11-26 DIAGNOSIS — Z51.81 THERAPEUTIC DRUG MONITORING: ICD-10-CM

## 2024-11-26 PROBLEM — Z86.0101 HISTORY OF ADENOMATOUS POLYP OF COLON: Status: RESOLVED | Noted: 2024-05-22 | Resolved: 2024-11-26

## 2024-11-26 PROBLEM — K64.8 INTERNAL HEMORRHOIDS: Status: RESOLVED | Noted: 2024-08-06 | Resolved: 2024-11-26

## 2024-11-26 PROCEDURE — 90662 IIV NO PRSV INCREASED AG IM: CPT | Performed by: INTERNAL MEDICINE

## 2024-11-26 PROCEDURE — 90471 IMMUNIZATION ADMIN: CPT | Performed by: INTERNAL MEDICINE

## 2024-11-26 PROCEDURE — 99214 OFFICE O/P EST MOD 30 MIN: CPT | Performed by: INTERNAL MEDICINE

## 2024-11-26 RX ORDER — LISINOPRIL 10 MG/1
TABLET ORAL
Start: 2024-11-26

## 2024-11-26 RX ORDER — LEVOTHYROXINE SODIUM 100 UG/1
TABLET ORAL
Start: 2024-11-26

## 2024-11-26 RX ORDER — SIMVASTATIN 40 MG
TABLET ORAL
Start: 2024-11-26

## 2024-11-26 RX ORDER — GLYBURIDE 5 MG/1
TABLET ORAL
Start: 2024-11-26

## 2024-11-26 RX ORDER — TADALAFIL 5 MG/1
TABLET ORAL
Start: 2024-11-26

## 2024-11-26 RX ORDER — DAPAGLIFLOZIN 10 MG/1
TABLET, FILM COATED ORAL
Qty: 90 TABLET | Refills: 3 | Status: SHIPPED | OUTPATIENT
Start: 2024-11-26

## 2024-11-26 RX ORDER — SITAGLIPTIN AND METFORMIN HYDROCHLORIDE 1000; 50 MG/1; MG/1
TABLET, FILM COATED, EXTENDED RELEASE ORAL
Start: 2024-11-26

## 2024-11-26 NOTE — PROGRESS NOTES
11/26/2024    Patient Information  Lionel Carson                                                                                          [address]  [address]  [facility] [address]      1955  [unfilled]  There is no work phone number on file.    Chief Complaint:     Follow-up chronic medical problems.  Recent lab work.  No new acute complaints.    History of Present Illness:    Patient with multiple chronic medical problems as noted below in the assessment and plan presents today for a follow-up with lab prior in order to monitor his chronic medical issues.  Past medical history reviewed and updated were necessary including health maintenance parameters.  This reveals he needs influenza vaccine which we gave today.    Review of Systems   Constitutional: Negative.   HENT: Negative.     Eyes: Negative.    Cardiovascular: Negative.    Respiratory: Negative.     Endocrine: Negative.    Hematologic/Lymphatic: Negative.    Skin: Negative.    Musculoskeletal: Negative.    Gastrointestinal: Negative.    Genitourinary: Negative.    Neurological: Negative.    Psychiatric/Behavioral: Negative.     Allergic/Immunologic: Negative.        Active Problems:    Patient Active Problem List   Diagnosis    Allergic rhinitis    Benign essential hypertension    History of colon polyps, 8/6/2024--tubular adenoma x 1.  12/18/2018--negative.  01/26/2015--tubular adenoma ×1.  Probable hyperplastic ×1.  Repeat due 3 years.    Diverticulosis of colon    Hyperlipidemia    Primary hypothyroidism    Multiple environmental allergies    Type 2 diabetes mellitus without complication, without long-term current use of insulin    Vitamin D deficiency    Therapeutic drug monitoring    Routine physical examination    Diabetic foot exam    Diabetic eye exam    Chronic anemia    Nuclear cataract    Overweight (BMI 25.0-29.9)         Past Medical History:   Diagnosis Date    Benign essential hypertension 09/07/2007     09/07/2007--initial treatment with lisinopril that was primarily for renal protection.    Chronic anemia 08/03/2017 08/03/2017--routine physical.  Hemoglobin is low at 13.3.  Hematocrit low at 40.0.  Indices are normal.  Repeat CBC, iron studies, homocystine, and methylmalonic acid ordered.  Patient will follow-up on the phone for the results and possible further instructions.  He is aware that it takes 2 weeks for one lab test to return.    Diverticulosis of colon 01/26/2015 01/26/2015--colonoscopy revealed extensive internal and external hemorrhoids. The patient had 2 sessile polyps, one at the splenic flexure and the other in the ascending colon near the hepatic flexure which were removed and sent for pathology. Left colon diverticulosis also noted. The ascending colon polyp returned tubular adenoma. The splenic flexure polyp was markedly cauterized and partially denuded. Focal hyperplastic features. No definite evidence of adenomatous change. Repeat study due 3 years.    History of anemia 02/04/2015 12/06/2015--hemoglobin normal at 13.2, hematocrit normal at 39.5. Resolve this issue.   02/04/2015--homocystine and methylmalonic acid are normal. Serum iron low normal at 62. Iron saturation low at 16%. Colonoscopy ordered and returned one hyperplastic polyp and one tubular adenomatous polyp.   01/25/2015--routine CBC reveals a hemoglobin low at 13.1, hematocrit low at 40.3. Homocystine, methylma    History of colon polyps, 8/6/2024--tubular adenoma x 1.  12/18/2018--negative.  01/26/2015--tubular adenoma ×1.  Probable hyperplastic ×1.  Repeat due 3 years. 01/26/2015 August 6, 2024--colonoscopy revealed a 3 mm polyp in the descending colon.  Removed.  Pathology returned tubular adenoma x 1.  Multiple small and medium mouth diverticuli noted.  Internal hemorrhoids nonbleeding.     12/18/2018--colonoscopy revealed multiple small and large mouth diverticula in the sigmoid and descending colon.  There was  evidence of an impacted diverticulum.  No bleeding.  Nonble    Hyperlipidemia 09/07/2007 09/07/2007--initial treatment hyperlipidemia.    Hypothyroidism 06/18/2008 06/18/2008--initial diagnosis and treatment hypothyroidism.    Internal hemorrhoids 08/06/2024    Multiple environmental allergies 05/17/2016    Patient had allergy testing as a child.    Type 2 diabetes mellitus without complication, without long-term current use of insulin 07/17/2006 July 2006--initial diagnosis diabetes.    Vitamin D deficiency 06/16/2016         Past Surgical History:   Procedure Laterality Date    COLONOSCOPY  01/26/2015 01/26/2015--colonoscopy revealed extensive internal and external hemorrhoids. The patient had 2 sessile polyps, one at the splenic flexure and the other in the ascending colon near the hepatic flexure which were removed and sent for pathology. Left colon diverticulosis also noted. The ascending colon polyp returned tubular adenoma. The splenic flexure polyp was markedly cauterized and partially de    COLONOSCOPY N/A 12/18/2018 12/18/2018--colonoscopy revealed multiple small and large mouth diverticula in the sigmoid and descending colon.  There was evidence of an impacted diverticulum.  No bleeding.  Nonbleeding internal hemorrhoids noted.  Grade 3.  No polyps noted.  No specimens taken.    COLONOSCOPY N/A 8/6/2024    Procedure: COLONOSCOPY INTO CECUM/ TERMINAL ILEUM WITH POLYPECTOMY;  Surgeon: Kuldeep Chambers MD;  Location: [facility] ENDOSCOPY;  Service: General;  Laterality: N/A;  pre: personal hx colon polyps  post: diverticulosis, polyp, hemorrhoids         No Known Allergies        Current Outpatient Medications:     aspirin 81 MG EC tablet, Take 1 tablet by mouth Daily., Disp: , Rfl:     Cholecalciferol (VITAMIN D) 2000 UNITS capsule, Take 1 capsule by mouth Daily., Disp: , Rfl:     glyburide (DIAbeta) 5 MG tablet, Take 1 tablet (5 mg) at breakfast and supper for diabetes, Disp: , Rfl:      "levothyroxine (Synthroid) 100 MCG tablet, Take 1 tablet (100 mcg) daily for low thyroid., Disp: , Rfl:     lisinopril (PRINIVIL,ZESTRIL) 10 MG tablet, Take 1 tablet (10 mg) every morning for high blood pressure and renal protection from diabetes, Disp: , Rfl:     Semaglutide, 2 MG/DOSE, (Ozempic, 2 MG/DOSE,) 8 MG/3ML solution pen-injector, Inject 2 mg subcutaneously weekly as directed for diabetes, Disp: 9 mL, Rfl: 3    simvastatin (ZOCOR) 40 MG tablet, Take 1 tablet (40 mg) every day for high cholesterol, Disp: , Rfl:     SITagliptin-metFORMIN HCl ER (Janumet XR)  MG tablet, Take 1 tablet ( mg) twice daily at breakfast and supper for diabetes, Disp: , Rfl:     tadalafil (CIALIS) 5 MG tablet, Take 1 tablet daily (5 mg) for prostate, Disp: , Rfl:     dapagliflozin Propanediol (Farxiga) 10 MG tablet, Take 1 p.o. daily for diabetes, Disp: 90 tablet, Rfl: 3      Family History   Problem Relation Age of Onset    Diabetes Mother         Type 2    Malig Hyperthermia Neg Hx          Social History     Socioeconomic History    Marital status:     Number of children: 0    Highest education level: Bachelor's degree (e.g., BA, AB, BS)   Tobacco Use    Smoking status: Never    Smokeless tobacco: Never   Vaping Use    Vaping status: Never Used   Substance and Sexual Activity    Alcohol use: No    Drug use: No    Sexual activity: Yes     Partners: Female     Birth control/protection: Other, None         Vitals:    11/26/24 0724   BP: 126/70   Pulse: 73   Resp: 16   Temp: 98.1 °F (36.7 °C)   TempSrc: Oral   SpO2: 97%   Weight: 89.4 kg (197 lb)   Height: 182.9 cm (72.01\")        Body mass index is 26.71 kg/m².      Physical Exam:    General: Alert and oriented x 3.  No acute distress.  Normal affect.  Mildly overweight.  HEENT: Pupils equal, round, reactive to light; extraocular movements intact; sclerae nonicteric; pharynx, ear canals and TMs normal.  Neck: Without JVD, thyromegaly, bruit, or adenopathy.  " Lungs: Clear to auscultation in all fields.  Heart: Regular rate and rhythm without murmur, rub, gallop, or click.  Abdomen: Soft, nontender, without hepatosplenomegaly or hernia.  Bowel sounds normal.  : Deferred.  Rectal: Deferred.  Extremities: Without clubbing, cyanosis, edema, or pulse deficit.  Neurologic: Intact without focal deficit.  Normal station and gait observed during ingress and egress from the examination room.  Skin: Without significant lesion.  Musculoskeletal: Unremarkable.    November 26, 2024--routine diabetic foot exam reveals no evidence of diabetic foot ulcer or preulcerative callus.  Distal pulses palpable and sensation intact.    Lab/other results:    I reviewed the results of the recent colonoscopy which revealed a tubular adenoma x 1, diverticulosis, and internal nonbleeding hemorrhoids.    CBC normal except hemoglobin slightly low at 12.4.  Hematocrit normal at 38.1.  CK normal at 93.  CMP normal except glucose 174, creatinine 1.32, estimated GFR 59.  Hemoglobin A1c 8.3.  Total cholesterol 108, triglycerides elevated 174, LDL particle #454, HDL particle #33.5.  TSH is slightly suppressed at 0.306.  Free T4 is elevated at 1.84.  Free T3 normal.  Vitamin D normal at 47.8.    Assessment/Plan:     Diagnosis Plan   1. Type 2 diabetes mellitus without complication, without long-term current use of insulin  SITagliptin-metFORMIN HCl ER (Janumet XR)  MG tablet    glyburide (DIAbeta) 5 MG tablet    dapagliflozin Propanediol (Farxiga) 10 MG tablet    Comprehensive Metabolic Panel    Hemoglobin A1c      2. Primary hypothyroidism  levothyroxine (Synthroid) 100 MCG tablet    TSH    T4, Free    T3, Free      3. Hyperlipidemia  simvastatin (ZOCOR) 40 MG tablet      4. Vitamin D deficiency        5. Chronic anemia        6. Benign essential hypertension  lisinopril (PRINIVIL,ZESTRIL) 10 MG tablet      7. Multiple environmental allergies        8. Diabetic foot exam        9. Overweight (BMI  25.0-29.9)        10. History of colon polyps, 8/6/2024--tubular adenoma x 1.  12/18/2018--negative.  01/26/2015--tubular adenoma ×1.  Probable hyperplastic ×1.  Repeat due 3 years.        11. Therapeutic drug monitoring        12. Routine physical examination        13. Benign prostatic hyperplasia with hesitancy  tadalafil (CIALIS) 5 MG tablet      14. Need for influenza vaccination  Fluzone High-Dose 65+yrs ([id number])        Patient has type 2 diabetes that is not at goal.  Medication adjustment indicated.  Also patient's creatinine is slightly elevated and we will monitor this closely.  Will not enter a diagnosis of chronic renal insufficiency at this point.  Hyperlipidemia is under good control on the current regimen.  His TSH is slightly suppressed but not enough to make any changes at this time.  Chronic anemia is mild and patient had a recent colonoscopy which revealed tubular adenoma x 1 and no source of bleeding.  Patient is mildly overweight and I once again encouraged low carbohydrate diet, exercise, and weight loss.  Vitamin D is in the normal range with supplementation.    Plan is as follows: Start Farxiga 10 mg/day.  No other changes in medical regimen at the present time.  Hopefully with the addition of Farxiga we will be able to back off of some of his other diabetes medication and in particular glyburide.  Remains to be seen.  High-dose influenza vaccine given today.  No other changes in medical regimen.  Patient will obtain fasting lab work and follow-up in 8 weeks.        Procedures

## 2024-12-10 ENCOUNTER — TELEPHONE (OUTPATIENT)
Dept: INTERNAL MEDICINE | Facility: CLINIC | Age: 69
End: 2024-12-10

## 2024-12-10 ENCOUNTER — PRIOR AUTHORIZATION (OUTPATIENT)
Dept: INTERNAL MEDICINE | Facility: CLINIC | Age: 69
End: 2024-12-10
Payer: COMMERCIAL

## 2024-12-10 NOTE — TELEPHONE ENCOUNTER
PATIENT CALLED AND STATES HIS PHARMACY CALLING IN REGARDS OF PRIOR AUTHORIZATION FOR     dapagliflozin Propanediol (Farxiga) 10 MG tablet   NEW MEDICATION    CALL BACK NUMBER [phone]    [facility], [facility]. - [place], [place] - [address] [place] [address] [phone] [facility] [phone]  [phone]

## 2024-12-10 NOTE — TELEPHONE ENCOUNTER
Farxiga PA done on cover my meds and approved. Patient informed.     PA Case: [id number], Status: Approved, Coverage Starts on: 12/10/2024 12:00:00 AM, Coverage Ends on: 12/10/2025 12:00:00 AM.  Authorization Expiration Date: 12/9/2025

## 2024-12-10 NOTE — TELEPHONE ENCOUNTER
Farxiga PA done on cover my meds and approved, patient informed.     PA Case: [id number], Status: Approved, Coverage Starts on: 12/10/2024 12:00:00 AM, Coverage Ends on: 12/10/2025 12:00:00 AM.  Authorization Expiration Date: 12/9/2025

## 2025-01-10 DIAGNOSIS — E11.9 TYPE 2 DIABETES MELLITUS WITHOUT COMPLICATION, WITHOUT LONG-TERM CURRENT USE OF INSULIN: Chronic | ICD-10-CM

## 2025-01-10 DIAGNOSIS — E78.2 MIXED HYPERLIPIDEMIA: Chronic | ICD-10-CM

## 2025-01-10 DIAGNOSIS — I10 BENIGN ESSENTIAL HYPERTENSION: Chronic | ICD-10-CM

## 2025-01-10 DIAGNOSIS — E03.9 PRIMARY HYPOTHYROIDISM: Chronic | ICD-10-CM

## 2025-01-13 RX ORDER — SIMVASTATIN 40 MG
TABLET ORAL
Qty: 90 TABLET | Refills: 1 | Status: SHIPPED | OUTPATIENT
Start: 2025-01-13

## 2025-01-13 RX ORDER — SITAGLIPTIN AND METFORMIN HYDROCHLORIDE 1000; 50 MG/1; MG/1
TABLET, FILM COATED, EXTENDED RELEASE ORAL
Qty: 30 TABLET | Refills: 1 | Status: SHIPPED | OUTPATIENT
Start: 2025-01-13

## 2025-01-13 RX ORDER — DAPAGLIFLOZIN 10 MG/1
TABLET, FILM COATED ORAL
Qty: 90 TABLET | Refills: 3 | Status: SHIPPED | OUTPATIENT
Start: 2025-01-13

## 2025-01-13 RX ORDER — LISINOPRIL 10 MG/1
TABLET ORAL
Qty: 30 TABLET | Refills: 1 | Status: SHIPPED | OUTPATIENT
Start: 2025-01-13

## 2025-01-13 RX ORDER — LEVOTHYROXINE SODIUM 100 UG/1
TABLET ORAL
Qty: 30 TABLET | Refills: 1 | Status: SHIPPED | OUTPATIENT
Start: 2025-01-13

## 2025-01-13 RX ORDER — GLYBURIDE 5 MG/1
TABLET ORAL
Qty: 60 TABLET | Refills: 1 | Status: SHIPPED | OUTPATIENT
Start: 2025-01-13

## 2025-02-03 DIAGNOSIS — E03.9 PRIMARY HYPOTHYROIDISM: Chronic | ICD-10-CM

## 2025-02-03 DIAGNOSIS — I10 BENIGN ESSENTIAL HYPERTENSION: Chronic | ICD-10-CM

## 2025-02-03 DIAGNOSIS — E11.9 TYPE 2 DIABETES MELLITUS WITHOUT COMPLICATION, WITHOUT LONG-TERM CURRENT USE OF INSULIN: Chronic | ICD-10-CM

## 2025-02-04 RX ORDER — LISINOPRIL 10 MG/1
TABLET ORAL
Qty: 30 TABLET | Refills: 1 | Status: SHIPPED | OUTPATIENT
Start: 2025-02-04

## 2025-02-04 RX ORDER — SITAGLIPTIN AND METFORMIN HYDROCHLORIDE 1000; 50 MG/1; MG/1
TABLET, FILM COATED, EXTENDED RELEASE ORAL
Qty: 30 TABLET | Refills: 1 | Status: SHIPPED | OUTPATIENT
Start: 2025-02-04

## 2025-02-04 RX ORDER — LEVOTHYROXINE SODIUM 100 UG/1
TABLET ORAL
Qty: 30 TABLET | Refills: 1 | Status: SHIPPED | OUTPATIENT
Start: 2025-02-04

## 2025-02-04 RX ORDER — GLYBURIDE 5 MG/1
TABLET ORAL
Qty: 60 TABLET | Refills: 1 | Status: SHIPPED | OUTPATIENT
Start: 2025-02-04

## 2025-03-05 ENCOUNTER — OFFICE VISIT (OUTPATIENT)
Dept: INTERNAL MEDICINE | Facility: CLINIC | Age: 70
End: 2025-03-05
Payer: COMMERCIAL

## 2025-03-05 VITALS
DIASTOLIC BLOOD PRESSURE: 68 MMHG | BODY MASS INDEX: 25.87 KG/M2 | HEART RATE: 87 BPM | SYSTOLIC BLOOD PRESSURE: 118 MMHG | OXYGEN SATURATION: 98 % | RESPIRATION RATE: 16 BRPM | TEMPERATURE: 99 F | HEIGHT: 72 IN | WEIGHT: 191 LBS

## 2025-03-05 DIAGNOSIS — E03.9 PRIMARY HYPOTHYROIDISM: Chronic | ICD-10-CM

## 2025-03-05 DIAGNOSIS — Z86.0100 HISTORY OF COLON POLYPS: Chronic | ICD-10-CM

## 2025-03-05 DIAGNOSIS — E66.3 OVERWEIGHT (BMI 25.0-29.9): Chronic | ICD-10-CM

## 2025-03-05 DIAGNOSIS — I10 BENIGN ESSENTIAL HYPERTENSION: Chronic | ICD-10-CM

## 2025-03-05 DIAGNOSIS — E78.2 MIXED HYPERLIPIDEMIA: Chronic | ICD-10-CM

## 2025-03-05 DIAGNOSIS — E55.9 VITAMIN D DEFICIENCY: Chronic | ICD-10-CM

## 2025-03-05 DIAGNOSIS — D64.9 CHRONIC ANEMIA: ICD-10-CM

## 2025-03-05 DIAGNOSIS — Z91.09 MULTIPLE ENVIRONMENTAL ALLERGIES: Chronic | ICD-10-CM

## 2025-03-05 DIAGNOSIS — E11.9 TYPE 2 DIABETES MELLITUS WITHOUT COMPLICATION, WITHOUT LONG-TERM CURRENT USE OF INSULIN: Primary | Chronic | ICD-10-CM

## 2025-03-05 DIAGNOSIS — Z51.81 THERAPEUTIC DRUG MONITORING: ICD-10-CM

## 2025-03-05 DIAGNOSIS — R79.89 ELEVATED SERUM CREATININE: ICD-10-CM

## 2025-03-05 DIAGNOSIS — Z00.00 ROUTINE PHYSICAL EXAMINATION: ICD-10-CM

## 2025-03-05 RX ORDER — GLYBURIDE 5 MG/1
TABLET ORAL
Start: 2025-03-05

## 2025-03-05 RX ORDER — SITAGLIPTIN AND METFORMIN HYDROCHLORIDE 1000; 50 MG/1; MG/1
TABLET, FILM COATED, EXTENDED RELEASE ORAL
Qty: 180 TABLET | Refills: 1 | Status: SHIPPED | OUTPATIENT
Start: 2025-03-05

## 2025-03-05 RX ORDER — LEVOTHYROXINE SODIUM 100 UG/1
TABLET ORAL
Qty: 90 TABLET | Refills: 1 | Status: SHIPPED | OUTPATIENT
Start: 2025-03-05

## 2025-03-05 NOTE — PROGRESS NOTES
03/05/2025    Patient Information  Lionel Carson                                                                                          [address]  [address]  [facility] [address]      1955  [unfilled]  There is no work phone number on file.    Chief Complaint:     Follow-up chronic medical problems and recent blood work.    History of Present Illness:    Patient with several chronic medical problems including type 2 diabetes and overweight status presents today for a follow-up with lab prior in order to monitor his chronic medical issues.  He was evaluated several months ago and was started on Farxiga for renal protection and improvement of his diabetes.  His weight is down about 6 pounds.  Goal is hopefully to be able to back off of some of the lesser medications for his diabetes such as glyburide.  He is on semaglutide 2 mg subcutaneous injection every week.  He is on simvastatin for his cholesterol and seems to be tolerating that.  He also has hypothyroidism that we will assess and he is on Synthroid 100 mcg/day.  Patient is also on Janumet XR 50/1000 p.o. twice daily.    Review of Systems   Constitutional: Negative. Negative for weight loss.   HENT: Negative.     Eyes: Negative.  Negative for blurred vision.   Cardiovascular: Negative.    Respiratory: Negative.     Endocrine: Negative.  Negative for polydipsia and polyuria.   Hematologic/Lymphatic: Negative.    Skin: Negative.    Musculoskeletal: Negative.    Gastrointestinal: Negative.    Genitourinary: Negative.    Neurological: Negative.  Negative for headaches and tremors.   Psychiatric/Behavioral: Negative.     Allergic/Immunologic: Negative.        Active Problems:    Patient Active Problem List   Diagnosis    Allergic rhinitis    Benign essential hypertension    History of colon polyps, 8/6/2024--tubular adenoma x 1.  12/18/2018--negative.  01/26/2015--tubular adenoma ×1.  Probable hyperplastic ×1.  Repeat due 3 years.     Diverticulosis of colon    Hyperlipidemia    Primary hypothyroidism    Multiple environmental allergies    Type 2 diabetes mellitus without complication, without long-term current use of insulin    Vitamin D deficiency    Therapeutic drug monitoring    Routine physical examination    Diabetic foot exam    Diabetic eye exam    Chronic anemia    Nuclear cataract    Overweight (BMI 25.0-29.9)    Elevated serum creatinine         Past Medical History:   Diagnosis Date    Benign essential hypertension 09/07/2007 09/07/2007--initial treatment with lisinopril that was primarily for renal protection.    Chronic anemia 08/03/2017 08/03/2017--routine physical.  Hemoglobin is low at 13.3.  Hematocrit low at 40.0.  Indices are normal.  Repeat CBC, iron studies, homocystine, and methylmalonic acid ordered.  Patient will follow-up on the phone for the results and possible further instructions.  He is aware that it takes 2 weeks for one lab test to return.    Colon polyp     Diverticulosis of colon 01/26/2015 01/26/2015--colonoscopy revealed extensive internal and external hemorrhoids. The patient had 2 sessile polyps, one at the splenic flexure and the other in the ascending colon near the hepatic flexure which were removed and sent for pathology. Left colon diverticulosis also noted. The ascending colon polyp returned tubular adenoma. The splenic flexure polyp was markedly cauterized and partially denuded. Focal hyperplastic features. No definite evidence of adenomatous change. Repeat study due 3 years.    History of anemia 02/04/2015 12/06/2015--hemoglobin normal at 13.2, hematocrit normal at 39.5. Resolve this issue.   02/04/2015--homocystine and methylmalonic acid are normal. Serum iron low normal at 62. Iron saturation low at 16%. Colonoscopy ordered and returned one hyperplastic polyp and one tubular adenomatous polyp.   01/25/2015--routine CBC reveals a hemoglobin low at 13.1, hematocrit low at 40.3.  Homocystine, methylma    History of colon polyps, 8/6/2024--tubular adenoma x 1.  12/18/2018--negative.  01/26/2015--tubular adenoma ×1.  Probable hyperplastic ×1.  Repeat due 3 years. 01/26/2015 August 6, 2024--colonoscopy revealed a 3 mm polyp in the descending colon.  Removed.  Pathology returned tubular adenoma x 1.  Multiple small and medium mouth diverticuli noted.  Internal hemorrhoids nonbleeding.     12/18/2018--colonoscopy revealed multiple small and large mouth diverticula in the sigmoid and descending colon.  There was evidence of an impacted diverticulum.  No bleeding.  Nonble    Hyperlipidemia 09/07/2007 09/07/2007--initial treatment hyperlipidemia.    Hypothyroidism 06/18/2008 06/18/2008--initial diagnosis and treatment hypothyroidism.    Internal hemorrhoids 08/06/2024    Multiple environmental allergies 05/17/2016    Patient had allergy testing as a child.    Type 2 diabetes mellitus without complication, without long-term current use of insulin 07/17/2006 July 2006--initial diagnosis diabetes.    Vitamin D deficiency 06/16/2016         Past Surgical History:   Procedure Laterality Date    COLONOSCOPY  01/26/2015 01/26/2015--colonoscopy revealed extensive internal and external hemorrhoids. The patient had 2 sessile polyps, one at the splenic flexure and the other in the ascending colon near the hepatic flexure which were removed and sent for pathology. Left colon diverticulosis also noted. The ascending colon polyp returned tubular adenoma. The splenic flexure polyp was markedly cauterized and partially de    COLONOSCOPY N/A 12/18/2018 12/18/2018--colonoscopy revealed multiple small and large mouth diverticula in the sigmoid and descending colon.  There was evidence of an impacted diverticulum.  No bleeding.  Nonbleeding internal hemorrhoids noted.  Grade 3.  No polyps noted.  No specimens taken.    COLONOSCOPY N/A 8/6/2024    Procedure: COLONOSCOPY INTO CECUM/ TERMINAL ILEUM WITH  POLYPECTOMY;  Surgeon: Kuldeep Chambers MD;  Location: [facility] ENDOSCOPY;  Service: General;  Laterality: N/A;  pre: personal hx colon polyps  post: diverticulosis, polyp, hemorrhoids         No Known Allergies        Current Outpatient Medications:     aspirin 81 MG EC tablet, Take 1 tablet by mouth Daily., Disp: , Rfl:     Cholecalciferol (VITAMIN D) 2000 UNITS capsule, Take 1 capsule by mouth Daily., Disp: , Rfl:     dapagliflozin Propanediol (Farxiga) 10 MG tablet, Take 1 p.o. daily for diabetes, Disp: 90 tablet, Rfl: 3    glyburide (DIAbeta) 5 MG tablet, Take 1 tablet (5 mg) once daily at suppertime for diabetes, Disp: , Rfl:     levothyroxine (Synthroid) 100 MCG tablet, Take 1 tablet (100 mcg) daily for low thyroid., Disp: 90 tablet, Rfl: 1    Semaglutide, 2 MG/DOSE, (Ozempic, 2 MG/DOSE,) 8 MG/3ML solution pen-injector, Inject 2 mg subcutaneously weekly as directed for diabetes, Disp: 9 mL, Rfl: 3    simvastatin (ZOCOR) 40 MG tablet, Take 1 tablet (40 mg) every day for high cholesterol, Disp: 90 tablet, Rfl: 1    SITagliptin-metFORMIN HCl ER (Janumet XR)  MG tablet, Take 1 tablet ( mg) twice daily at breakfast and supper for diabetes, Disp: 180 tablet, Rfl: 1    tadalafil (CIALIS) 5 MG tablet, Take 1 tablet daily (5 mg) for prostate, Disp: , Rfl:       Family History   Problem Relation Age of Onset    Diabetes Mother         Type 2    Malig Hyperthermia Neg Hx          Social History     Socioeconomic History    Marital status:     Number of children: 0    Highest education level: Bachelor's degree (e.g., BA, AB, BS)   Tobacco Use    Smoking status: Never    Smokeless tobacco: Never   Vaping Use    Vaping status: Never Used   Substance and Sexual Activity    Alcohol use: No    Drug use: No    Sexual activity: Yes     Partners: Female     Birth control/protection: Other, None         Vitals:    03/05/25 0751   BP: 118/68   Pulse: 87   Resp: 16   Temp: 99 °F (37.2 °C)   TempSrc:  "Oral   SpO2: 98%   Weight: 86.6 kg (191 lb)   Height: 182.9 cm (72.01\")        Body mass index is 25.9 kg/m².      Physical Exam:    General: Alert and oriented x 3.  No acute distress.  Normal affect.  HEENT: Pupils equal, round, reactive to light; extraocular movements intact; sclerae nonicteric; pharynx, ear canals and TMs normal.  Neck: Without JVD, thyromegaly, bruit, or adenopathy.  Lungs: Clear to auscultation in all fields.  Heart: Regular rate and rhythm without murmur, rub, gallop, or click.  Abdomen: Soft, nontender, without hepatosplenomegaly or hernia.  Bowel sounds normal.  : Deferred.  Rectal: Deferred.  Extremities: Without clubbing, cyanosis, edema, or pulse deficit.  Neurologic: Intact without focal deficit.  Normal station and gait observed during ingress and egress from the examination room.  Skin: Without significant lesion.  Musculoskeletal: Unremarkable.    Lab/other results:    CMP normal except creatinine 1.47, estimated GFR 52.  Hemoglobin A1c 7.5.  TSH is a little suppressed at 0.163.  Free T4 elevated 1.74.  Free T3 normal.    Assessment/Plan:     Diagnosis Plan   1. Type 2 diabetes mellitus without complication, without long-term current use of insulin  glyburide (DIAbeta) 5 MG tablet    Comprehensive Metabolic Panel    Hemoglobin A1c    Microalbumin / Creatinine Urine Ratio - Urine, Clean Catch    Urinalysis With Microscopic If Indicated (No Culture) - Urine, Clean Catch    SITagliptin-metFORMIN HCl ER (Janumet XR)  MG tablet      2. Benign essential hypertension  Comprehensive Metabolic Panel      3. Hyperlipidemia  CK    Comprehensive Metabolic Panel    NMR LipoProfile      4. Primary hypothyroidism  TSH    T4, Free    T3, Free    levothyroxine (Synthroid) 100 MCG tablet      5. Vitamin D deficiency  Vitamin D,25-Hydroxy      6. Overweight (BMI 25.0-29.9)        7. Chronic anemia  CBC (No Diff)      8. History of colon polyps, 8/6/2024--tubular adenoma x 1.  " 12/18/2018--negative.  01/26/2015--tubular adenoma ×1.  Probable hyperplastic ×1.  Repeat due 3 years.        9. Multiple environmental allergies        10. Therapeutic drug monitoring  CBC (No Diff)      11. Routine physical examination  CBC (No Diff)    CK    Comprehensive Metabolic Panel    Hemoglobin A1c    Microalbumin / Creatinine Urine Ratio - Urine, Clean Catch    NMR LipoProfile    TSH    T4, Free    T3, Free    PSA DIAGNOSTIC    Urinalysis With Microscopic If Indicated (No Culture) - Urine, Clean Catch    Vitamin D,25-Hydroxy      12. Elevated serum creatinine  CBC (No Diff)    Comprehensive Metabolic Panel        Patient has type 2 diabetes is not quite at goal but is much better than it was previously.  His blood pressure runs a little on the low side which is indicative of his elevated creatinine.  I think we need to make a medication adjustment.  His TSH is slightly suppressed but not bad enough to make any changes at this time but will need to be monitored.  His weight is down 6 pounds and his BMI is almost normal.  I think chronic anemia has resolved but we will continue to monitor.  Hyperlipidemia has been under good control.    Plan is as follows: We will discontinue lisinopril 10 mg/day.  Stay on the Farxiga 10 mg/day along with semaglutide 2 mg subcutaneously weekly.  Decrease glyburide from 5 mg twice a day down to 1 pill daily. Will schedule him for his annual physical with lab prior on or after May 1, 2025.  Will extend this out to 3 months from now.        Procedures

## 2025-03-17 DIAGNOSIS — E11.9 TYPE 2 DIABETES MELLITUS WITHOUT COMPLICATION, WITHOUT LONG-TERM CURRENT USE OF INSULIN: Chronic | ICD-10-CM

## 2025-03-17 DIAGNOSIS — E03.9 PRIMARY HYPOTHYROIDISM: Chronic | ICD-10-CM

## 2025-03-18 RX ORDER — LEVOTHYROXINE SODIUM 100 UG/1
TABLET ORAL
Qty: 90 TABLET | Refills: 1 | OUTPATIENT
Start: 2025-03-18

## 2025-03-18 RX ORDER — SITAGLIPTIN AND METFORMIN HYDROCHLORIDE 1000; 50 MG/1; MG/1
TABLET, FILM COATED, EXTENDED RELEASE ORAL
Qty: 180 TABLET | Refills: 1 | OUTPATIENT
Start: 2025-03-18

## 2025-03-21 DIAGNOSIS — E03.9 PRIMARY HYPOTHYROIDISM: Chronic | ICD-10-CM

## 2025-03-21 DIAGNOSIS — E11.9 TYPE 2 DIABETES MELLITUS WITHOUT COMPLICATION, WITHOUT LONG-TERM CURRENT USE OF INSULIN: Chronic | ICD-10-CM

## 2025-03-24 RX ORDER — SITAGLIPTIN AND METFORMIN HYDROCHLORIDE 1000; 50 MG/1; MG/1
TABLET, FILM COATED, EXTENDED RELEASE ORAL
Qty: 180 TABLET | Refills: 1 | OUTPATIENT
Start: 2025-03-24

## 2025-03-24 RX ORDER — LEVOTHYROXINE SODIUM 100 UG/1
TABLET ORAL
Qty: 90 TABLET | Refills: 1 | OUTPATIENT
Start: 2025-03-24

## 2025-03-28 ENCOUNTER — TELEPHONE (OUTPATIENT)
Dept: INTERNAL MEDICINE | Facility: CLINIC | Age: 70
End: 2025-03-28
Payer: COMMERCIAL

## 2025-03-28 DIAGNOSIS — E11.9 TYPE 2 DIABETES MELLITUS WITHOUT COMPLICATION, WITHOUT LONG-TERM CURRENT USE OF INSULIN: Chronic | ICD-10-CM

## 2025-03-28 DIAGNOSIS — E03.9 PRIMARY HYPOTHYROIDISM: Chronic | ICD-10-CM

## 2025-03-28 RX ORDER — SITAGLIPTIN AND METFORMIN HYDROCHLORIDE 1000; 50 MG/1; MG/1
TABLET, FILM COATED, EXTENDED RELEASE ORAL
Qty: 180 TABLET | Refills: 1 | Status: SHIPPED | OUTPATIENT
Start: 2025-03-28

## 2025-03-28 RX ORDER — LEVOTHYROXINE SODIUM 100 UG/1
TABLET ORAL
Qty: 90 TABLET | Refills: 1 | Status: SHIPPED | OUTPATIENT
Start: 2025-03-28

## 2025-03-28 NOTE — TELEPHONE ENCOUNTER
"  Caller: Lionel Carson \"J Carlos\"    Relationship: Self    Best call back number:     [phone]       What was the call regarding: [facility]QUEST[facility] MEDICATIONS TO BE FILLED WITH HIS LOCAL PHARMACY. IT WAS SEEN AS A DUPLICATE AND DENIED. PATIENT IS OUT OF THE FOLLOWING MEDICATIONS AND REQUESTING THEM TO BE SENT TO [facility]      levothyroxine (Synthroid) 100 MCG tablet       SITagliptin-metFORMIN HCl ER (Janumet XR)  MG tablet       [facility] [id number] - [place], [place] [address] [facility] [address] - [phone]  - [phone]  [phone]   "

## 2025-04-08 DIAGNOSIS — E78.2 MIXED HYPERLIPIDEMIA: Chronic | ICD-10-CM

## 2025-04-08 DIAGNOSIS — E03.9 PRIMARY HYPOTHYROIDISM: Chronic | ICD-10-CM

## 2025-04-08 NOTE — TELEPHONE ENCOUNTER
"    Caller: Lionel Carson \"J Carlos\"    Relationship: Self    Best call back number: [phone]      Requested Prescriptions:   Requested Prescriptions     Pending Prescriptions Disp Refills    levothyroxine (Synthroid) 100 MCG tablet 90 tablet 1     Sig: Take 1 tablet (100 mcg) daily for low thyroid.    simvastatin (ZOCOR) 40 MG tablet 90 tablet 1     Sig: Take 1 tablet (40 mg) every day for high cholesterol        Pharmacy where request should be sent: [id number] [address] [facility]  [place]  [phone] [facility] [phone] FX     Last office visit with prescribing clinician: 3/5/2025   Last telemedicine visit with prescribing clinician: Visit date not found   Next office visit with prescribing clinician: 6/20/2025     Does the patient have less than a 3 day supply:  [] Yes  [x] No    Porter Robles Rep   04/08/25 13:25 EDT   "

## 2025-04-09 RX ORDER — SIMVASTATIN 40 MG
TABLET ORAL
Qty: 90 TABLET | Refills: 1 | Status: SHIPPED | OUTPATIENT
Start: 2025-04-09

## 2025-04-09 RX ORDER — LEVOTHYROXINE SODIUM 100 UG/1
TABLET ORAL
Qty: 90 TABLET | Refills: 1 | OUTPATIENT
Start: 2025-04-09

## 2025-04-16 ENCOUNTER — TELEPHONE (OUTPATIENT)
Dept: INTERNAL MEDICINE | Facility: CLINIC | Age: 70
End: 2025-04-16
Payer: COMMERCIAL

## 2025-04-16 DIAGNOSIS — E11.9 TYPE 2 DIABETES MELLITUS WITHOUT COMPLICATION, WITHOUT LONG-TERM CURRENT USE OF INSULIN: Chronic | ICD-10-CM

## 2025-04-16 RX ORDER — DAPAGLIFLOZIN 10 MG/1
TABLET, FILM COATED ORAL
Qty: 90 TABLET | Refills: 3 | Status: SHIPPED | OUTPATIENT
Start: 2025-04-16

## 2025-04-16 NOTE — TELEPHONE ENCOUNTER
"    Caller: Lionel Carson \"J Carlos\"    Relationship: Self    Best call back number: [phone]     Requested Prescriptions: dapagliflozin Propanediol (Farxiga) 10 MG tablet   Requested Prescriptions      No prescriptions requested or ordered in this encounter        Pharmacy where request should be sent:    [id number] [address] AT [facility] [address]  [place] - [phone]  - [phone]  [phone]   Last office visit with prescribing clinician: 3/5/2025   Last telemedicine visit with prescribing clinician: Visit date not found   Next office visit with prescribing clinician: 6/20/2025     Additional details provided by patient: PATIENT IS REQUESTING A NEW 90 DAY PRESCRIPTION WITH REFILLS FOR THE ABOVE MEDICATION.    Does the patient have less than a 3 day supply:  [x] Yes  [] No    Would you like a call back once the refill request has been completed: [] Yes [] No    If the office needs to give you a call back, can they leave a voicemail: [] Yes [] No    Porter Archuleta Rep   04/16/25 10:46 EDT           "

## 2025-05-18 DIAGNOSIS — E11.9 TYPE 2 DIABETES MELLITUS WITHOUT COMPLICATION, WITHOUT LONG-TERM CURRENT USE OF INSULIN: Chronic | ICD-10-CM

## 2025-05-19 RX ORDER — GLYBURIDE 5 MG/1
TABLET ORAL
Qty: 90 TABLET | Refills: 3 | Status: SHIPPED | OUTPATIENT
Start: 2025-05-19

## 2025-06-16 DIAGNOSIS — E03.9 PRIMARY HYPOTHYROIDISM: Chronic | ICD-10-CM

## 2025-06-16 DIAGNOSIS — R39.11 BENIGN PROSTATIC HYPERPLASIA WITH HESITANCY: ICD-10-CM

## 2025-06-16 DIAGNOSIS — N40.1 BENIGN PROSTATIC HYPERPLASIA WITH HESITANCY: ICD-10-CM

## 2025-06-16 DIAGNOSIS — E11.9 TYPE 2 DIABETES MELLITUS WITHOUT COMPLICATION, WITHOUT LONG-TERM CURRENT USE OF INSULIN: Chronic | ICD-10-CM

## 2025-06-16 RX ORDER — LEVOTHYROXINE SODIUM 100 UG/1
TABLET ORAL
Qty: 90 TABLET | Refills: 3 | Status: SHIPPED | OUTPATIENT
Start: 2025-06-16

## 2025-06-16 RX ORDER — TADALAFIL 5 MG/1
TABLET ORAL
Qty: 90 TABLET | Refills: 3 | Status: SHIPPED | OUTPATIENT
Start: 2025-06-16

## 2025-06-16 RX ORDER — SEMAGLUTIDE 2.68 MG/ML
INJECTION, SOLUTION SUBCUTANEOUS
Qty: 9 ML | Refills: 3 | Status: SHIPPED | OUTPATIENT
Start: 2025-06-16

## 2025-06-16 NOTE — TELEPHONE ENCOUNTER
"Caller: Lionel Carson \"J Carlos\"    Relationship: Self    Requested Prescriptions:   Requested Prescriptions     Pending Prescriptions Disp Refills    Semaglutide, 2 MG/DOSE, (Ozempic, 2 MG/DOSE,) 8 MG/3ML solution pen-injector 9 mL 3     Sig: Inject 2 mg subcutaneously weekly as directed for diabetes      Pharmacy where request should be sent: [facility] #[id number] [facility] [address] AT [facility] [place]  [phone] [facility] [phone] FX     Last office visit with prescribing clinician: 3/5/2025   Last telemedicine visit with prescribing clinician: Visit date not found   Next office visit with prescribing clinician: 6/20/2025       Porter Kelsey Rep   06/16/25 09:23 EDT       "

## 2025-06-20 ENCOUNTER — OFFICE VISIT (OUTPATIENT)
Dept: INTERNAL MEDICINE | Facility: CLINIC | Age: 70
End: 2025-06-20
Payer: COMMERCIAL

## 2025-06-20 VITALS
HEIGHT: 72 IN | RESPIRATION RATE: 16 BRPM | SYSTOLIC BLOOD PRESSURE: 130 MMHG | OXYGEN SATURATION: 97 % | DIASTOLIC BLOOD PRESSURE: 68 MMHG | HEART RATE: 78 BPM | BODY MASS INDEX: 25.73 KG/M2 | TEMPERATURE: 98.4 F | WEIGHT: 190 LBS

## 2025-06-20 DIAGNOSIS — E78.2 MIXED HYPERLIPIDEMIA: Chronic | ICD-10-CM

## 2025-06-20 DIAGNOSIS — E66.3 OVERWEIGHT (BMI 25.0-29.9): Chronic | ICD-10-CM

## 2025-06-20 DIAGNOSIS — E03.9 PRIMARY HYPOTHYROIDISM: Chronic | ICD-10-CM

## 2025-06-20 DIAGNOSIS — Z51.81 THERAPEUTIC DRUG MONITORING: ICD-10-CM

## 2025-06-20 DIAGNOSIS — N18.31 CHRONIC RENAL IMPAIRMENT, STAGE 3A: ICD-10-CM

## 2025-06-20 DIAGNOSIS — R79.89 ELEVATED SERUM CREATININE: ICD-10-CM

## 2025-06-20 DIAGNOSIS — E55.9 VITAMIN D DEFICIENCY: Chronic | ICD-10-CM

## 2025-06-20 DIAGNOSIS — Z91.09 MULTIPLE ENVIRONMENTAL ALLERGIES: Chronic | ICD-10-CM

## 2025-06-20 DIAGNOSIS — E11.9 TYPE 2 DIABETES MELLITUS WITHOUT COMPLICATION, WITHOUT LONG-TERM CURRENT USE OF INSULIN: Chronic | ICD-10-CM

## 2025-06-20 DIAGNOSIS — J30.1 CHRONIC SEASONAL ALLERGIC RHINITIS DUE TO POLLEN: Chronic | ICD-10-CM

## 2025-06-20 DIAGNOSIS — E11.9 ENCOUNTER FOR DIABETIC FOOT EXAM: Chronic | ICD-10-CM

## 2025-06-20 DIAGNOSIS — D64.9 CHRONIC ANEMIA: ICD-10-CM

## 2025-06-20 DIAGNOSIS — Z00.00 ROUTINE PHYSICAL EXAMINATION: Primary | ICD-10-CM

## 2025-06-20 DIAGNOSIS — I10 BENIGN ESSENTIAL HYPERTENSION: Chronic | ICD-10-CM

## 2025-06-20 DIAGNOSIS — Z86.0100 HISTORY OF COLON POLYPS: Chronic | ICD-10-CM

## 2025-06-20 NOTE — PROGRESS NOTES
06/20/2025    Patient Information  Lionel Carson                                                                                          [address]  [address]  [facility] [address]      1955  [unfilled]  There is no work phone number on file.    Chief Complaint:     Routine annual physical examination/preventative visit.  No new acute complaints.    History of Present Illness:    Patient with several chronic medical problems as noted below in assessment and plan including type 2 diabetes presents today for his routine annual physical exam/preventative visit.  No new acute complaints.  Past medical history reviewed and updated were necessary including health maintenance parameters.  This reveals he will be up-to-date or else accounted for after today's visit.    Review of Systems   Constitutional: Negative.   HENT: Negative.     Eyes: Negative.    Cardiovascular: Negative.    Respiratory: Negative.     Endocrine: Negative.    Hematologic/Lymphatic: Negative.    Skin: Negative.    Musculoskeletal: Negative.    Gastrointestinal: Negative.    Genitourinary: Negative.    Neurological: Negative.    Psychiatric/Behavioral: Negative.     Allergic/Immunologic: Negative.        Active Problems:    Patient Active Problem List   Diagnosis    Allergic rhinitis    Benign essential hypertension    History of colon polyps, 8/6/2024--tubular adenoma x 1.  12/18/2018--negative.  01/26/2015--tubular adenoma ×1.  Probable hyperplastic ×1.  Repeat due 3 years.    Diverticulosis of colon    Hyperlipidemia    Primary hypothyroidism    Multiple environmental allergies    Type 2 diabetes mellitus with kidney complication, without long-term current use of insulin    Vitamin D deficiency    Therapeutic drug monitoring    Routine physical examination    Diabetic foot exam    Diabetic eye exam    Chronic anemia    Nuclear cataract    Overweight (BMI 25.0-29.9)    Chronic renal impairment, stage 3a         Past Medical  History:   Diagnosis Date    Benign essential hypertension 09/07/2007 09/07/2007--initial treatment with lisinopril that was primarily for renal protection.    Chronic anemia 08/03/2017 08/03/2017--routine physical.  Hemoglobin is low at 13.3.  Hematocrit low at 40.0.  Indices are normal.  Repeat CBC, iron studies, homocystine, and methylmalonic acid ordered.  Patient will follow-up on the phone for the results and possible further instructions.  He is aware that it takes 2 weeks for one lab test to return.    Chronic renal impairment, stage 3a 06/20/2025    Colon polyp     Diverticulosis of colon 01/26/2015 01/26/2015--colonoscopy revealed extensive internal and external hemorrhoids. The patient had 2 sessile polyps, one at the splenic flexure and the other in the ascending colon near the hepatic flexure which were removed and sent for pathology. Left colon diverticulosis also noted. The ascending colon polyp returned tubular adenoma. The splenic flexure polyp was markedly cauterized and partially denuded. Focal hyperplastic features. No definite evidence of adenomatous change. Repeat study due 3 years.    History of anemia 02/04/2015 12/06/2015--hemoglobin normal at 13.2, hematocrit normal at 39.5. Resolve this issue.   02/04/2015--homocystine and methylmalonic acid are normal. Serum iron low normal at 62. Iron saturation low at 16%. Colonoscopy ordered and returned one hyperplastic polyp and one tubular adenomatous polyp.   01/25/2015--routine CBC reveals a hemoglobin low at 13.1, hematocrit low at 40.3. Homocystine, methylma    History of colon polyps, 8/6/2024--tubular adenoma x 1.  12/18/2018--negative.  01/26/2015--tubular adenoma ×1.  Probable hyperplastic ×1.  Repeat due 3 years. 01/26/2015 August 6, 2024--colonoscopy revealed a 3 mm polyp in the descending colon.  Removed.  Pathology returned tubular adenoma x 1.  Multiple small and medium mouth diverticuli noted.  Internal hemorrhoids  nonbleeding.     12/18/2018--colonoscopy revealed multiple small and large mouth diverticula in the sigmoid and descending colon.  There was evidence of an impacted diverticulum.  No bleeding.  Nonble    Hyperlipidemia 09/07/2007 09/07/2007--initial treatment hyperlipidemia.    Hypothyroidism 06/18/2008 06/18/2008--initial diagnosis and treatment hypothyroidism.    Internal hemorrhoids 08/06/2024    Multiple environmental allergies 05/17/2016    Patient had allergy testing as a child.    Type 2 diabetes mellitus without complication, without long-term current use of insulin 07/17/2006 July 2006--initial diagnosis diabetes.    Vitamin D deficiency 06/16/2016         Past Surgical History:   Procedure Laterality Date    COLONOSCOPY  01/26/2015 01/26/2015--colonoscopy revealed extensive internal and external hemorrhoids. The patient had 2 sessile polyps, one at the splenic flexure and the other in the ascending colon near the hepatic flexure which were removed and sent for pathology. Left colon diverticulosis also noted. The ascending colon polyp returned tubular adenoma. The splenic flexure polyp was markedly cauterized and partially de    COLONOSCOPY N/A 12/18/2018 12/18/2018--colonoscopy revealed multiple small and large mouth diverticula in the sigmoid and descending colon.  There was evidence of an impacted diverticulum.  No bleeding.  Nonbleeding internal hemorrhoids noted.  Grade 3.  No polyps noted.  No specimens taken.    COLONOSCOPY N/A 8/6/2024    Procedure: COLONOSCOPY INTO CECUM/ TERMINAL ILEUM WITH POLYPECTOMY;  Surgeon: Kuldeep Chambers MD;  Location: [facility] ENDOSCOPY;  Service: General;  Laterality: N/A;  pre: personal hx colon polyps  post: diverticulosis, polyp, hemorrhoids         No Known Allergies        Current Outpatient Medications:     aspirin 81 MG EC tablet, Take 1 tablet by mouth Daily., Disp: , Rfl:     Cholecalciferol (VITAMIN D) 2000 UNITS capsule, Take 1 capsule  "by mouth Daily., Disp: , Rfl:     dapagliflozin Propanediol (Farxiga) 10 MG tablet, Take 1 p.o. daily for diabetes, Disp: 90 tablet, Rfl: 3    glyburide (DIAbeta) 5 MG tablet, Take 1 tablet (5 mg) once daily at suppertime for diabetes, Disp: 90 tablet, Rfl: 3    levothyroxine (Synthroid) 100 MCG tablet, Take 1 tablet (100 mcg) daily for low thyroid., Disp: 90 tablet, Rfl: 3    Semaglutide, 2 MG/DOSE, (Ozempic, 2 MG/DOSE,) 8 MG/3ML solution pen-injector, Inject 2 mg subcutaneously weekly as directed for diabetes, Disp: 9 mL, Rfl: 3    simvastatin (ZOCOR) 40 MG tablet, Take 1 tablet (40 mg) every day for high cholesterol, Disp: 90 tablet, Rfl: 1    SITagliptin-metFORMIN HCl ER (Janumet XR)  MG tablet, Take 1 tablet ( mg) twice daily at breakfast and supper for diabetes, Disp: 180 tablet, Rfl: 1    tadalafil (CIALIS) 5 MG tablet, Take 1 tablet daily (5 mg) for prostate, Disp: 90 tablet, Rfl: 3      Family History   Problem Relation Age of Onset    Diabetes Mother         Type 2    Malig Hyperthermia Neg Hx          Social History     Socioeconomic History    Marital status:     Number of children: 0    Highest education level: Bachelor's degree (e.g., BA, AB, BS)   Tobacco Use    Smoking status: Never    Smokeless tobacco: Never   Vaping Use    Vaping status: Never Used   Substance and Sexual Activity    Alcohol use: No    Drug use: No    Sexual activity: Yes     Partners: Female     Birth control/protection: Other, None         Vitals:    06/20/25 0733   BP: 130/68   Pulse: 78   Resp: 16   Temp: 98.4 °F (36.9 °C)   TempSrc: Oral   SpO2: 97%   Weight: 86.2 kg (190 lb)   Height: 182.9 cm (72.01\")        Body mass index is 25.76 kg/m².      Physical Exam:    General: Alert and oriented x 3.  No acute distress.  Borderline overweight.  Normal affect.  HEENT: Pupils equal, round, reactive to light; extraocular movements intact; sclerae nonicteric; pharynx, ear canals and TMs normal.  Neck: Without " JVD, thyromegaly, bruit, or adenopathy.  Lungs: Clear to auscultation in all fields.  Heart: Regular rate and rhythm without murmur, rub, gallop, or click.  Abdomen: Soft, nontender, without hepatosplenomegaly or hernia.  Bowel sounds normal.  : Deferred.  Rectal: Deferred.  Extremities: Without clubbing, cyanosis, edema, or pulse deficit.  Neurologic: Intact without focal deficit.  Normal station and gait observed during ingress and egress from the examination room.  Skin: Without significant lesion.  Musculoskeletal: Unremarkable.    June 28, 2025--routine diabetic foot exam reveals no evidence of diabetic foot ulcer or preulcerative callus.  Distal pulses palpable.  Sensation intact.    Lab/other results:    CBC normal except hemoglobin 12.6.  Hematocrit 40.9.  CK normal at 71.  CMP reveals a glucose of 210, creatinine 1.36, estimated GFR 57.  Hemoglobin A1c 9.3.  Microalbumin/creatinine ratio 3.  Total cholesterol 121, triglycerides 190, LDL particle #586, HDL particle #27.4.  Thyroid function tests are normal.  PSA normal at 0.2.  Urinalysis reveals 3+ glucose.  Vitamin D normal at 76.1.    Assessment/Plan:     Diagnosis Plan   1. Routine physical examination        2. Type 2 diabetes mellitus without complication, without long-term current use of insulin  Comprehensive Metabolic Panel    Hemoglobin A1c      3. Elevated serum creatinine        4. Hyperlipidemia  NMR LipoProfile      5. Primary hypothyroidism        6. Benign essential hypertension        7. Vitamin D deficiency        8. Chronic anemia        9. Diabetic foot exam        10. Overweight (BMI 25.0-29.9)        11. History of colon polyps, 8/6/2024--tubular adenoma x 1.  12/18/2018--negative.  01/26/2015--tubular adenoma ×1.  Probable hyperplastic ×1.  Repeat due 3 years.        12. Multiple environmental allergies        13. Allergic rhinitis        14. Therapeutic drug monitoring        15. Chronic renal impairment, stage 3a  NMR LipoProfile     CBC (No Diff)        Patient presents for his routine annual physical exam/preventative visit.  His medical problems are stable with the exception of his diabetes which is not under proper control.  At the last visit his A1c 3 months ago was 7.5 which was better.  We cut his glyburide down to once a day at that time.  He is currently on Janumet, semaglutide, glyburide, and Farxiga.  Patient indicates he ran out of semaglutide about 2 weeks ago.  We also apparently cut his Janumet down to 50/1000 once a day.  He just refilled it.  He also has stage IIIa chronic renal insufficiency.    Several preventative health issues discussed including review of vaccinations and recommendations, including dietary issues, exercise and weight loss.  Safe sex practices discussed.  Patient advised to wear seatbelt whenever driving and avoid texting and driving.  Also advised to look both ways before crossing the street.  Colon cancer prevention discussed and is up-to-date with colonoscopy.  Advised to avoid tobacco products and minimize alcohol consumption.    Plan is as follows: Increase Janumet back up to 50-1,000 twice daily.  Stay on Ozempic 2 mg subcutaneously weekly and glyburide 5 mg once a day and stay on Farxiga 10 mg/day.  Will set up fasting lab work and follow-up in 4 months.    Procedures

## 2025-07-13 DIAGNOSIS — E11.9 TYPE 2 DIABETES MELLITUS WITHOUT COMPLICATION, WITHOUT LONG-TERM CURRENT USE OF INSULIN: Chronic | ICD-10-CM

## 2025-07-14 RX ORDER — DAPAGLIFLOZIN 10 MG/1
TABLET, FILM COATED ORAL
Qty: 90 TABLET | Refills: 3 | Status: SHIPPED | OUTPATIENT
Start: 2025-07-14

## 2025-08-10 DIAGNOSIS — E11.9 TYPE 2 DIABETES MELLITUS WITHOUT COMPLICATION, WITHOUT LONG-TERM CURRENT USE OF INSULIN: Chronic | ICD-10-CM

## 2025-08-11 RX ORDER — SITAGLIPTIN AND METFORMIN HYDROCHLORIDE 1000; 50 MG/1; MG/1
TABLET, FILM COATED, EXTENDED RELEASE ORAL
Qty: 180 TABLET | Refills: 3 | Status: SHIPPED | OUTPATIENT
Start: 2025-08-11

## 2025-08-27 DIAGNOSIS — E11.9 TYPE 2 DIABETES MELLITUS WITHOUT COMPLICATION, WITHOUT LONG-TERM CURRENT USE OF INSULIN: Chronic | ICD-10-CM

## 2025-08-27 RX ORDER — GLYBURIDE 5 MG/1
TABLET ORAL
Qty: 90 TABLET | Refills: 3 | Status: SHIPPED | OUTPATIENT
Start: 2025-08-27

## (undated) DEVICE — KT ORCA ORCAPOD DISP STRL

## (undated) DEVICE — SENSR O2 OXIMAX FNGR A/ 18IN NONSTR

## (undated) DEVICE — Device: Brand: DEFENDO AIR/WATER/SUCTION AND BIOPSY VALVE

## (undated) DEVICE — ADAPT CLN BIOGUARD AIR/H2O DISP

## (undated) DEVICE — LN SMPL CO2 SHTRM SD STREAM W/M LUER

## (undated) DEVICE — CANN O2 ETCO2 FITS ALL CONN CO2 SMPL A/ 7IN DISP LF

## (undated) DEVICE — TUBING, SUCTION, 1/4" X 10', STRAIGHT: Brand: MEDLINE

## (undated) DEVICE — CANNULA,ADULT,SOFT-TOUCH,7'TUBE,UC: Brand: PENDING

## (undated) DEVICE — THE TORRENT IRRIGATION SCOPE CONNECTOR IS USED WITH THE TORRENT IRRIGATION TUBING TO PROVIDE IRRIGATION FLUIDS SUCH AS STERILE WATER DURING GASTROINTESTINAL ENDOSCOPIC PROCEDURES WHEN USED IN CONJUNCTION WITH AN IRRIGATION PUMP (OR ELECTROSURGICAL UNIT).: Brand: TORRENT

## (undated) DEVICE — SINGLE-USE BIOPSY FORCEPS: Brand: RADIAL JAW 4